# Patient Record
Sex: MALE | Race: WHITE | NOT HISPANIC OR LATINO
[De-identification: names, ages, dates, MRNs, and addresses within clinical notes are randomized per-mention and may not be internally consistent; named-entity substitution may affect disease eponyms.]

---

## 2017-01-15 ENCOUNTER — RESULT REVIEW (OUTPATIENT)
Age: 77
End: 2017-01-15

## 2017-03-19 ENCOUNTER — RESULT REVIEW (OUTPATIENT)
Age: 77
End: 2017-03-19

## 2017-05-22 ENCOUNTER — RESULT REVIEW (OUTPATIENT)
Age: 77
End: 2017-05-22

## 2017-08-03 ENCOUNTER — APPOINTMENT (OUTPATIENT)
Dept: ORTHOPEDIC SURGERY | Facility: CLINIC | Age: 77
End: 2017-08-03
Payer: MEDICARE

## 2017-08-03 DIAGNOSIS — M17.12 UNILATERAL PRIMARY OSTEOARTHRITIS, LEFT KNEE: ICD-10-CM

## 2017-08-03 PROCEDURE — 99213 OFFICE O/P EST LOW 20 MIN: CPT

## 2017-09-08 PROBLEM — M17.12 PRIMARY OSTEOARTHRITIS OF LEFT KNEE: Status: ACTIVE | Noted: 2017-09-08

## 2018-04-25 ENCOUNTER — APPOINTMENT (OUTPATIENT)
Dept: VASCULAR SURGERY | Facility: CLINIC | Age: 78
End: 2018-04-25
Payer: MEDICARE

## 2018-04-25 VITALS
BODY MASS INDEX: 23.62 KG/M2 | WEIGHT: 165 LBS | DIASTOLIC BLOOD PRESSURE: 80 MMHG | TEMPERATURE: 98.4 F | HEART RATE: 76 BPM | HEIGHT: 70 IN | SYSTOLIC BLOOD PRESSURE: 128 MMHG

## 2018-04-25 DIAGNOSIS — R09.89 OTHER SPECIFIED SYMPTOMS AND SIGNS INVOLVING THE CIRCULATORY AND RESPIRATORY SYSTEMS: ICD-10-CM

## 2018-04-25 PROCEDURE — 99213 OFFICE O/P EST LOW 20 MIN: CPT

## 2018-08-27 ENCOUNTER — RESULT REVIEW (OUTPATIENT)
Age: 78
End: 2018-08-27

## 2018-10-29 ENCOUNTER — RX RENEWAL (OUTPATIENT)
Age: 78
End: 2018-10-29

## 2020-10-26 ENCOUNTER — INPATIENT (INPATIENT)
Facility: HOSPITAL | Age: 80
LOS: 6 days | Discharge: HOME | DRG: 872 | End: 2020-11-02
Attending: HOSPITALIST | Admitting: STUDENT IN AN ORGANIZED HEALTH CARE EDUCATION/TRAINING PROGRAM
Payer: MEDICARE

## 2020-10-26 VITALS
WEIGHT: 162.04 LBS | DIASTOLIC BLOOD PRESSURE: 55 MMHG | SYSTOLIC BLOOD PRESSURE: 134 MMHG | TEMPERATURE: 99 F | OXYGEN SATURATION: 98 % | HEART RATE: 114 BPM | RESPIRATION RATE: 20 BRPM | HEIGHT: 70 IN

## 2020-10-26 DIAGNOSIS — Z90.49 ACQUIRED ABSENCE OF OTHER SPECIFIED PARTS OF DIGESTIVE TRACT: Chronic | ICD-10-CM

## 2020-10-26 DIAGNOSIS — S32.9XXA FRACTURE OF UNSPECIFIED PARTS OF LUMBOSACRAL SPINE AND PELVIS, INITIAL ENCOUNTER FOR CLOSED FRACTURE: Chronic | ICD-10-CM

## 2020-10-26 LAB
ALBUMIN SERPL ELPH-MCNC: 3.8 G/DL — SIGNIFICANT CHANGE UP (ref 3.3–5)
ALP SERPL-CCNC: 160 U/L — HIGH (ref 40–120)
ALT FLD-CCNC: 48 U/L — HIGH (ref 10–45)
ANION GAP SERPL CALC-SCNC: 12 MMOL/L — SIGNIFICANT CHANGE UP (ref 5–17)
APPEARANCE UR: ABNORMAL
APTT BLD: 32.8 SEC — SIGNIFICANT CHANGE UP (ref 27.5–35.5)
AST SERPL-CCNC: 46 U/L — HIGH (ref 10–40)
BACTERIA # UR AUTO: ABNORMAL
BILIRUB SERPL-MCNC: 0.7 MG/DL — SIGNIFICANT CHANGE UP (ref 0.2–1.2)
BILIRUB UR-MCNC: NEGATIVE — SIGNIFICANT CHANGE UP
BUN SERPL-MCNC: 20 MG/DL — SIGNIFICANT CHANGE UP (ref 7–23)
CALCIUM SERPL-MCNC: 9.3 MG/DL — SIGNIFICANT CHANGE UP (ref 8.4–10.5)
CHLORIDE SERPL-SCNC: 100 MMOL/L — SIGNIFICANT CHANGE UP (ref 96–108)
CO2 SERPL-SCNC: 25 MMOL/L — SIGNIFICANT CHANGE UP (ref 22–31)
COLOR SPEC: YELLOW — SIGNIFICANT CHANGE UP
CREAT SERPL-MCNC: 1.57 MG/DL — HIGH (ref 0.5–1.3)
DIFF PNL FLD: ABNORMAL
EPI CELLS # UR: 2 /HPF — SIGNIFICANT CHANGE UP
GLUCOSE SERPL-MCNC: 101 MG/DL — HIGH (ref 70–99)
GLUCOSE UR QL: NEGATIVE — SIGNIFICANT CHANGE UP
HCT VFR BLD CALC: 37 % — LOW (ref 39–50)
HGB BLD-MCNC: 12.1 G/DL — LOW (ref 13–17)
HYALINE CASTS # UR AUTO: 0 /LPF — SIGNIFICANT CHANGE UP (ref 0–2)
INR BLD: 1.32 RATIO — HIGH (ref 0.88–1.16)
KETONES UR-MCNC: SIGNIFICANT CHANGE UP
LACTATE BLDV-MCNC: 1.5 MMOL/L — SIGNIFICANT CHANGE UP (ref 0.7–2)
LEUKOCYTE ESTERASE UR-ACNC: ABNORMAL
MCHC RBC-ENTMCNC: 27.8 PG — SIGNIFICANT CHANGE UP (ref 27–34)
MCHC RBC-ENTMCNC: 32.7 GM/DL — SIGNIFICANT CHANGE UP (ref 32–36)
MCV RBC AUTO: 85.1 FL — SIGNIFICANT CHANGE UP (ref 80–100)
NITRITE UR-MCNC: NEGATIVE — SIGNIFICANT CHANGE UP
PH UR: 6 — SIGNIFICANT CHANGE UP (ref 5–8)
PLATELET # BLD AUTO: 234 K/UL — SIGNIFICANT CHANGE UP (ref 150–400)
POTASSIUM SERPL-MCNC: 4.3 MMOL/L — SIGNIFICANT CHANGE UP (ref 3.5–5.3)
POTASSIUM SERPL-SCNC: 4.3 MMOL/L — SIGNIFICANT CHANGE UP (ref 3.5–5.3)
PROT SERPL-MCNC: 7 G/DL — SIGNIFICANT CHANGE UP (ref 6–8.3)
PROT UR-MCNC: 100 — SIGNIFICANT CHANGE UP
PROTHROM AB SERPL-ACNC: 15.6 SEC — HIGH (ref 10.6–13.6)
RBC # BLD: 4.35 M/UL — SIGNIFICANT CHANGE UP (ref 4.2–5.8)
RBC # FLD: 14.7 % — HIGH (ref 10.3–14.5)
RBC CASTS # UR COMP ASSIST: 13 /HPF — HIGH (ref 0–4)
SODIUM SERPL-SCNC: 137 MMOL/L — SIGNIFICANT CHANGE UP (ref 135–145)
SP GR SPEC: 1.02 — SIGNIFICANT CHANGE UP (ref 1.01–1.02)
UROBILINOGEN FLD QL: NEGATIVE — SIGNIFICANT CHANGE UP
WBC # BLD: 21.17 K/UL — HIGH (ref 3.8–10.5)
WBC # FLD AUTO: 21.17 K/UL — HIGH (ref 3.8–10.5)
WBC UR QL: 420 /HPF — HIGH (ref 0–5)

## 2020-10-26 PROCEDURE — 93010 ELECTROCARDIOGRAM REPORT: CPT

## 2020-10-26 PROCEDURE — 71045 X-RAY EXAM CHEST 1 VIEW: CPT | Mod: 26

## 2020-10-26 PROCEDURE — 99285 EMERGENCY DEPT VISIT HI MDM: CPT | Mod: GC

## 2020-10-26 RX ORDER — SODIUM CHLORIDE 9 MG/ML
2300 INJECTION INTRAMUSCULAR; INTRAVENOUS; SUBCUTANEOUS ONCE
Refills: 0 | Status: COMPLETED | OUTPATIENT
Start: 2020-10-26 | End: 2020-10-26

## 2020-10-26 RX ORDER — CEFTRIAXONE 500 MG/1
1000 INJECTION, POWDER, FOR SOLUTION INTRAMUSCULAR; INTRAVENOUS ONCE
Refills: 0 | Status: DISCONTINUED | OUTPATIENT
Start: 2020-10-26 | End: 2020-10-26

## 2020-10-26 RX ORDER — PIPERACILLIN AND TAZOBACTAM 4; .5 G/20ML; G/20ML
3.38 INJECTION, POWDER, LYOPHILIZED, FOR SOLUTION INTRAVENOUS ONCE
Refills: 0 | Status: COMPLETED | OUTPATIENT
Start: 2020-10-26 | End: 2020-10-26

## 2020-10-26 RX ADMIN — PIPERACILLIN AND TAZOBACTAM 200 GRAM(S): 4; .5 INJECTION, POWDER, LYOPHILIZED, FOR SOLUTION INTRAVENOUS at 23:10

## 2020-10-26 RX ADMIN — SODIUM CHLORIDE 2300 MILLILITER(S): 9 INJECTION INTRAMUSCULAR; INTRAVENOUS; SUBCUTANEOUS at 23:20

## 2020-10-26 NOTE — ED PROVIDER NOTE - PSH
Cholecystitis    Fracture of pelvis    H/O colectomy  Partial  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  S/P cholecystectomy    S/P femoral-popliteal bypass surgery  Left

## 2020-10-26 NOTE — ED ADULT TRIAGE NOTE - CHIEF COMPLAINT QUOTE
pt states urinary symptoms continuing after course of cipro followed by augmentin pt states dysuria pressure and dribbling pt is diabetic pt has loop recorder in chest also states itching attacks to groin at night pt states he finished the antibiotic last week

## 2020-10-26 NOTE — ED PROVIDER NOTE - PHYSICAL EXAMINATION
GEN - NAD; well appearing; A+Ox3   HEAD - NC/AT, No visible Ecchymosis, No Abrasions, No Lacerations/Skin Tears     EYES - EOMI, no conjunctival pallor, no scleral icterus  PULM - CTA B/L,  symmetric breath sounds  COR -  RRR, S1 S2, no murmurs  ABD - NT/ND, soft, no guarding, no rebound, no masses    BACK - no CVA tenderness, nontender CTL spine     EXTREMS - 2+ Pulses B/L UE and LE; 0+ edema, no gross deformity, warm and well perfused, no calf tenderness/swelling/erythema    SKIN - no rash or bruising      NEUROLOGIC - alert, CN2-12 intact, sensation nl, moving all 4 ext GEN - NAD; well appearing; A+Ox3   HEAD - NC/AT, No visible Ecchymosis, No Abrasions, No Lacerations/Skin Tears     EYES - EOMI, no conjunctival pallor, no scleral icterus  PULM - Crackles R Base,  symmetric breath sounds  COR -  Tachy, S1 S2, no murmurs  ABD - NT/ND, soft, no guarding, no rebound, no masses    BACK - no CVA tenderness    EXTREMS - 0+ edema, no gross deformity, warm and well perfused, no calf tenderness/swelling/erythema    NEUROLOGIC - alert, moving all 4 ext w/ 5/5 Strength

## 2020-10-26 NOTE — ED ADULT NURSE REASSESSMENT NOTE - NS ED NURSE REASSESS COMMENT FT1
Received report from Nichelle METCALF. Patient resting in stretcher in purple room 4. VSS. Patient denies pain at this time. IV patent. NSR on monitor. Gave patient call bell and instructed to call RN if anything is needed. Bed in lowest position. Side rails up. Pending bloodwork results.

## 2020-10-26 NOTE — ED ADULT NURSE NOTE - NSIMPLEMENTINTERV_GEN_ALL_ED
Implemented All Universal Safety Interventions:  [place] to call system. Call bell, personal items and telephone within reach. Instruct patient to call for assistance. Room bathroom lighting operational. Non-slip footwear when patient is off stretcher. Physically safe environment: no spills, clutter or unnecessary equipment. Stretcher in lowest position, wheels locked, appropriate side rails in place.

## 2020-10-26 NOTE — ED PROVIDER NOTE - OBJECTIVE STATEMENT
79 male, hx: HTN, HLD, IDDM - presents with burning upon urination, pain upon urination for 1 month; fevers to (102F) today. Followed by PMD outpatient, started on Cipro (completed 1 week course), and then Augmentin (1 week course) - still with persistent dysuric symptoms and now development of fevers. Denies any CP, SOB, cough, abdominal pain, nausea, vomiting, diarrhea, constipation, bloody stools. Denies any testicular pain/swelling.

## 2020-10-26 NOTE — ED PROVIDER NOTE - NS ED ROS FT
Constitution: (+) Fever or chills,   Eyes: No visual changes  HEENT: No cough, No Discharge, No Rhinorrhea, No URI symptoms  Cardio: No Chest pain, No Palpitations, No Dyspnea  Resp: No SOB, No Wheezing  GI: No abdominal pain, No Nausea, No Vomiting, No Constipation, No Diarrhea  : (+) burning upon urination, (+) trouble urinating, (+) foul odor from urine  MSK: No Back pain, No Numbness, No Tingling, No Weakness

## 2020-10-26 NOTE — ED PROVIDER NOTE - CARE PLAN
Principal Discharge DX:	Acute cystitis with hematuria   Principal Discharge DX:	Pyelonephritis  Secondary Diagnosis:	Sepsis, due to unspecified organism, unspecified whether acute organ dysfunction present

## 2020-10-26 NOTE — ED PROVIDER NOTE - CLINICAL SUMMARY MEDICAL DECISION MAKING FREE TEXT BOX
79M pmh HTN, HLD, IDDM, 1 month of dysuria completed course of Cipro and Augmentin, now p/w persistent dysuria and fevers to (102F) today.  Pt found to be febrile, tachycardic although in NAD.  abd soft ntnd, no cva ttp.  likely septic from uti and failed PO abx.  Will plan for labs, ua, cultures, IV fluids, abx and admission.  - Angelika Anne, DO

## 2020-10-26 NOTE — ED ADULT NURSE NOTE - OBJECTIVE STATEMENT
79y male arrived to ED complaining of urinary sx. Patient complaining of itching, burning, urgency. Patient dx with yeast infection and UTI outpatient - given cipro and Augmentin without resolve of sx. Patient febrile in ED, tachycardic - code sepsis initiated. Patient post-residual void 30ml on bladder scan. PMHx DM, HLD, PVD, HTN. Patient denies CP, SOB, n/v/d, abd pain. Patient A&Ox4, ambulatory.

## 2020-10-26 NOTE — ED PROVIDER NOTE - PMH
Diabetes    Diabetes mellitus    Hypercholesteremia    Hyperlipidemia    Hypertension    Peripheral vascular disease

## 2020-10-27 DIAGNOSIS — N30.00 ACUTE CYSTITIS WITHOUT HEMATURIA: ICD-10-CM

## 2020-10-27 DIAGNOSIS — N12 TUBULO-INTERSTITIAL NEPHRITIS, NOT SPECIFIED AS ACUTE OR CHRONIC: ICD-10-CM

## 2020-10-27 DIAGNOSIS — N17.9 ACUTE KIDNEY FAILURE, UNSPECIFIED: ICD-10-CM

## 2020-10-27 DIAGNOSIS — N30.01 ACUTE CYSTITIS WITH HEMATURIA: ICD-10-CM

## 2020-10-27 DIAGNOSIS — N32.1 VESICOINTESTINAL FISTULA: ICD-10-CM

## 2020-10-27 DIAGNOSIS — Z29.9 ENCOUNTER FOR PROPHYLACTIC MEASURES, UNSPECIFIED: ICD-10-CM

## 2020-10-27 DIAGNOSIS — E11.22 TYPE 2 DIABETES MELLITUS WITH DIABETIC CHRONIC KIDNEY DISEASE: ICD-10-CM

## 2020-10-27 DIAGNOSIS — N40.1 BENIGN PROSTATIC HYPERPLASIA WITH LOWER URINARY TRACT SYMPTOMS: ICD-10-CM

## 2020-10-27 DIAGNOSIS — I10 ESSENTIAL (PRIMARY) HYPERTENSION: ICD-10-CM

## 2020-10-27 DIAGNOSIS — A41.9 SEPSIS, UNSPECIFIED ORGANISM: ICD-10-CM

## 2020-10-27 DIAGNOSIS — E78.00 PURE HYPERCHOLESTEROLEMIA, UNSPECIFIED: ICD-10-CM

## 2020-10-27 LAB
ALBUMIN SERPL ELPH-MCNC: 3.1 G/DL — LOW (ref 3.3–5)
ALP SERPL-CCNC: 132 U/L — HIGH (ref 40–120)
ALT FLD-CCNC: 37 U/L — SIGNIFICANT CHANGE UP (ref 10–45)
ANION GAP SERPL CALC-SCNC: 9 MMOL/L — SIGNIFICANT CHANGE UP (ref 5–17)
AST SERPL-CCNC: 33 U/L — SIGNIFICANT CHANGE UP (ref 10–40)
BASOPHILS # BLD AUTO: 0 K/UL — SIGNIFICANT CHANGE UP (ref 0–0.2)
BASOPHILS NFR BLD AUTO: 0 % — SIGNIFICANT CHANGE UP (ref 0–2)
BILIRUB SERPL-MCNC: 0.7 MG/DL — SIGNIFICANT CHANGE UP (ref 0.2–1.2)
BUN SERPL-MCNC: 19 MG/DL — SIGNIFICANT CHANGE UP (ref 7–23)
CALCIUM SERPL-MCNC: 8.4 MG/DL — SIGNIFICANT CHANGE UP (ref 8.4–10.5)
CHLORIDE SERPL-SCNC: 105 MMOL/L — SIGNIFICANT CHANGE UP (ref 96–108)
CO2 SERPL-SCNC: 23 MMOL/L — SIGNIFICANT CHANGE UP (ref 22–31)
CREAT SERPL-MCNC: 1.25 MG/DL — SIGNIFICANT CHANGE UP (ref 0.5–1.3)
EOSINOPHIL # BLD AUTO: 0.19 K/UL — SIGNIFICANT CHANGE UP (ref 0–0.5)
EOSINOPHIL NFR BLD AUTO: 0.9 % — SIGNIFICANT CHANGE UP (ref 0–6)
GLUCOSE BLDC GLUCOMTR-MCNC: 121 MG/DL — HIGH (ref 70–99)
GLUCOSE BLDC GLUCOMTR-MCNC: 197 MG/DL — HIGH (ref 70–99)
GLUCOSE BLDC GLUCOMTR-MCNC: 215 MG/DL — HIGH (ref 70–99)
GLUCOSE BLDC GLUCOMTR-MCNC: 250 MG/DL — HIGH (ref 70–99)
GLUCOSE SERPL-MCNC: 103 MG/DL — HIGH (ref 70–99)
HCT VFR BLD CALC: 32.7 % — LOW (ref 39–50)
HGB BLD-MCNC: 10.6 G/DL — LOW (ref 13–17)
LYMPHOCYTES # BLD AUTO: 1.44 K/UL — SIGNIFICANT CHANGE UP (ref 1–3.3)
LYMPHOCYTES # BLD AUTO: 6.8 % — LOW (ref 13–44)
MANUAL SMEAR VERIFICATION: SIGNIFICANT CHANGE UP
MCHC RBC-ENTMCNC: 28 PG — SIGNIFICANT CHANGE UP (ref 27–34)
MCHC RBC-ENTMCNC: 32.4 GM/DL — SIGNIFICANT CHANGE UP (ref 32–36)
MCV RBC AUTO: 86.5 FL — SIGNIFICANT CHANGE UP (ref 80–100)
MONOCYTES # BLD AUTO: 1.27 K/UL — HIGH (ref 0–0.9)
MONOCYTES NFR BLD AUTO: 6 % — SIGNIFICANT CHANGE UP (ref 2–14)
NEUTROPHILS # BLD AUTO: 18.27 K/UL — HIGH (ref 1.8–7.4)
NEUTROPHILS NFR BLD AUTO: 86.3 % — HIGH (ref 43–77)
NRBC # BLD: 0 /100 WBCS — SIGNIFICANT CHANGE UP (ref 0–0)
PLAT MORPH BLD: NORMAL — SIGNIFICANT CHANGE UP
PLATELET # BLD AUTO: 182 K/UL — SIGNIFICANT CHANGE UP (ref 150–400)
POTASSIUM SERPL-MCNC: 4.1 MMOL/L — SIGNIFICANT CHANGE UP (ref 3.5–5.3)
POTASSIUM SERPL-SCNC: 4.1 MMOL/L — SIGNIFICANT CHANGE UP (ref 3.5–5.3)
PROT SERPL-MCNC: 5.9 G/DL — LOW (ref 6–8.3)
RAPID RVP RESULT: SIGNIFICANT CHANGE UP
RBC # BLD: 3.78 M/UL — LOW (ref 4.2–5.8)
RBC # FLD: 14.7 % — HIGH (ref 10.3–14.5)
RBC BLD AUTO: SIGNIFICANT CHANGE UP
SARS-COV-2 RNA SPEC QL NAA+PROBE: SIGNIFICANT CHANGE UP
SODIUM SERPL-SCNC: 137 MMOL/L — SIGNIFICANT CHANGE UP (ref 135–145)
WBC # BLD: 13.97 K/UL — HIGH (ref 3.8–10.5)
WBC # FLD AUTO: 13.97 K/UL — HIGH (ref 3.8–10.5)

## 2020-10-27 PROCEDURE — 12345: CPT | Mod: NC

## 2020-10-27 PROCEDURE — 76770 US EXAM ABDO BACK WALL COMP: CPT | Mod: 26

## 2020-10-27 PROCEDURE — 99222 1ST HOSP IP/OBS MODERATE 55: CPT

## 2020-10-27 PROCEDURE — 99223 1ST HOSP IP/OBS HIGH 75: CPT | Mod: AI

## 2020-10-27 RX ORDER — CARVEDILOL PHOSPHATE 80 MG/1
6.25 CAPSULE, EXTENDED RELEASE ORAL EVERY 12 HOURS
Refills: 0 | Status: DISCONTINUED | OUTPATIENT
Start: 2020-10-27 | End: 2020-10-31

## 2020-10-27 RX ORDER — INSULIN LISPRO 100/ML
VIAL (ML) SUBCUTANEOUS AT BEDTIME
Refills: 0 | Status: DISCONTINUED | OUTPATIENT
Start: 2020-10-27 | End: 2020-11-02

## 2020-10-27 RX ORDER — SODIUM CHLORIDE 9 MG/ML
1000 INJECTION, SOLUTION INTRAVENOUS
Refills: 0 | Status: DISCONTINUED | OUTPATIENT
Start: 2020-10-27 | End: 2020-11-02

## 2020-10-27 RX ORDER — INSULIN LISPRO 100/ML
VIAL (ML) SUBCUTANEOUS
Refills: 0 | Status: DISCONTINUED | OUTPATIENT
Start: 2020-10-27 | End: 2020-11-02

## 2020-10-27 RX ORDER — GLUCAGON INJECTION, SOLUTION 0.5 MG/.1ML
1 INJECTION, SOLUTION SUBCUTANEOUS ONCE
Refills: 0 | Status: DISCONTINUED | OUTPATIENT
Start: 2020-10-27 | End: 2020-11-02

## 2020-10-27 RX ORDER — DEXTROSE 50 % IN WATER 50 %
15 SYRINGE (ML) INTRAVENOUS ONCE
Refills: 0 | Status: DISCONTINUED | OUTPATIENT
Start: 2020-10-27 | End: 2020-11-02

## 2020-10-27 RX ORDER — CILOSTAZOL 100 MG/1
50 TABLET ORAL
Refills: 0 | Status: DISCONTINUED | OUTPATIENT
Start: 2020-10-27 | End: 2020-11-02

## 2020-10-27 RX ORDER — DEXTROSE 50 % IN WATER 50 %
25 SYRINGE (ML) INTRAVENOUS ONCE
Refills: 0 | Status: DISCONTINUED | OUTPATIENT
Start: 2020-10-27 | End: 2020-11-02

## 2020-10-27 RX ORDER — ASPIRIN/CALCIUM CARB/MAGNESIUM 324 MG
81 TABLET ORAL DAILY
Refills: 0 | Status: DISCONTINUED | OUTPATIENT
Start: 2020-10-27 | End: 2020-11-02

## 2020-10-27 RX ORDER — SODIUM CHLORIDE 9 MG/ML
1000 INJECTION INTRAMUSCULAR; INTRAVENOUS; SUBCUTANEOUS
Refills: 0 | Status: DISCONTINUED | OUTPATIENT
Start: 2020-10-27 | End: 2020-10-28

## 2020-10-27 RX ORDER — INSULIN GLARGINE 100 [IU]/ML
30 INJECTION, SOLUTION SUBCUTANEOUS AT BEDTIME
Refills: 0 | Status: DISCONTINUED | OUTPATIENT
Start: 2020-10-27 | End: 2020-10-30

## 2020-10-27 RX ORDER — HEPARIN SODIUM 5000 [USP'U]/ML
5000 INJECTION INTRAVENOUS; SUBCUTANEOUS EVERY 8 HOURS
Refills: 0 | Status: DISCONTINUED | OUTPATIENT
Start: 2020-10-27 | End: 2020-11-02

## 2020-10-27 RX ORDER — TAMSULOSIN HYDROCHLORIDE 0.4 MG/1
0.4 CAPSULE ORAL AT BEDTIME
Refills: 0 | Status: DISCONTINUED | OUTPATIENT
Start: 2020-10-27 | End: 2020-11-01

## 2020-10-27 RX ORDER — ATORVASTATIN CALCIUM 80 MG/1
20 TABLET, FILM COATED ORAL AT BEDTIME
Refills: 0 | Status: DISCONTINUED | OUTPATIENT
Start: 2020-10-27 | End: 2020-11-02

## 2020-10-27 RX ORDER — DEXTROSE 50 % IN WATER 50 %
12.5 SYRINGE (ML) INTRAVENOUS ONCE
Refills: 0 | Status: DISCONTINUED | OUTPATIENT
Start: 2020-10-27 | End: 2020-11-02

## 2020-10-27 RX ORDER — PIPERACILLIN AND TAZOBACTAM 4; .5 G/20ML; G/20ML
3.38 INJECTION, POWDER, LYOPHILIZED, FOR SOLUTION INTRAVENOUS EVERY 8 HOURS
Refills: 0 | Status: COMPLETED | OUTPATIENT
Start: 2020-10-27 | End: 2020-10-30

## 2020-10-27 RX ORDER — PIPERACILLIN AND TAZOBACTAM 4; .5 G/20ML; G/20ML
3.38 INJECTION, POWDER, LYOPHILIZED, FOR SOLUTION INTRAVENOUS ONCE
Refills: 0 | Status: COMPLETED | OUTPATIENT
Start: 2020-10-27 | End: 2020-10-27

## 2020-10-27 RX ADMIN — TAMSULOSIN HYDROCHLORIDE 0.4 MILLIGRAM(S): 0.4 CAPSULE ORAL at 22:46

## 2020-10-27 RX ADMIN — ATORVASTATIN CALCIUM 20 MILLIGRAM(S): 80 TABLET, FILM COATED ORAL at 22:46

## 2020-10-27 RX ADMIN — HEPARIN SODIUM 5000 UNIT(S): 5000 INJECTION INTRAVENOUS; SUBCUTANEOUS at 14:30

## 2020-10-27 RX ADMIN — Medication 2: at 17:34

## 2020-10-27 RX ADMIN — Medication 81 MILLIGRAM(S): at 12:21

## 2020-10-27 RX ADMIN — INSULIN GLARGINE 30 UNIT(S): 100 INJECTION, SOLUTION SUBCUTANEOUS at 22:46

## 2020-10-27 RX ADMIN — HEPARIN SODIUM 5000 UNIT(S): 5000 INJECTION INTRAVENOUS; SUBCUTANEOUS at 22:46

## 2020-10-27 RX ADMIN — PIPERACILLIN AND TAZOBACTAM 200 GRAM(S): 4; .5 INJECTION, POWDER, LYOPHILIZED, FOR SOLUTION INTRAVENOUS at 08:18

## 2020-10-27 RX ADMIN — CARVEDILOL PHOSPHATE 6.25 MILLIGRAM(S): 80 CAPSULE, EXTENDED RELEASE ORAL at 17:34

## 2020-10-27 RX ADMIN — SODIUM CHLORIDE 75 MILLILITER(S): 9 INJECTION INTRAMUSCULAR; INTRAVENOUS; SUBCUTANEOUS at 16:32

## 2020-10-27 RX ADMIN — PIPERACILLIN AND TAZOBACTAM 25 GRAM(S): 4; .5 INJECTION, POWDER, LYOPHILIZED, FOR SOLUTION INTRAVENOUS at 14:30

## 2020-10-27 RX ADMIN — SODIUM CHLORIDE 2300 MILLILITER(S): 9 INJECTION INTRAMUSCULAR; INTRAVENOUS; SUBCUTANEOUS at 01:28

## 2020-10-27 RX ADMIN — Medication 1: at 12:20

## 2020-10-27 RX ADMIN — PIPERACILLIN AND TAZOBACTAM 25 GRAM(S): 4; .5 INJECTION, POWDER, LYOPHILIZED, FOR SOLUTION INTRAVENOUS at 22:47

## 2020-10-27 RX ADMIN — CILOSTAZOL 50 MILLIGRAM(S): 100 TABLET ORAL at 17:34

## 2020-10-27 NOTE — H&P ADULT - NSICDXFAMILYHX_GEN_ALL_CORE_FT
FAMILY HISTORY:  No pertinent family history in first degree relatives     FAMILY HISTORY:  FH: Alzheimers disease, mother   FH: diabetes mellitus, brother  FH: pancreatic cancer, father

## 2020-10-27 NOTE — H&P ADULT - PROBLEM SELECTOR PROBLEM 3
Type 2 diabetes mellitus with stage 3 chronic kidney disease, with long-term current use of insulin, unspecified whether stage 3a or 3b CKD

## 2020-10-27 NOTE — CONSULT NOTE ADULT - SUBJECTIVE AND OBJECTIVE BOX
HPI  Mr Giuliano is a 79 male with hx of HTN, HLD, DM presenting with refractory urinary symptoms and UTIs. Patient has been on multiple courses of antibiotics without improvement. He was seen by his PMD for followup and was found to have fever. Was sent to hospital for further evaluation.   Patient says that over the last few months he has had air in his urine. Also notes small brown specks that sediment at the bottom of his urine. Dysuria is present.    PAST MEDICAL & SURGICAL HISTORY:  BPH (benign prostatic hyperplasia)    Peripheral vascular disease    Diabetes mellitus    Hypercholesteremia    Hypertension    S/P cholecystectomy    Fracture of pelvis    S/P femoral-popliteal bypass surgery  Left    Cholecystitis    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    H/O colectomy  Partial        MEDICATIONS  (STANDING):  aspirin enteric coated 81 milliGRAM(s) Oral daily  atorvastatin 20 milliGRAM(s) Oral at bedtime  carvedilol 6.25 milliGRAM(s) Oral every 12 hours  cilostazol 50 milliGRAM(s) Oral two times a day  dextrose 5%. 1000 milliLiter(s) (50 mL/Hr) IV Continuous <Continuous>  dextrose 50% Injectable 12.5 Gram(s) IV Push once  dextrose 50% Injectable 25 Gram(s) IV Push once  dextrose 50% Injectable 25 Gram(s) IV Push once  heparin   Injectable 5000 Unit(s) SubCutaneous every 8 hours  insulin glargine Injectable (LANTUS) 30 Unit(s) SubCutaneous at bedtime  insulin lispro (ADMELOG) corrective regimen sliding scale   SubCutaneous three times a day before meals  insulin lispro (ADMELOG) corrective regimen sliding scale   SubCutaneous at bedtime  piperacillin/tazobactam IVPB.. 3.375 Gram(s) IV Intermittent every 8 hours  sodium chloride 0.9%. 1000 milliLiter(s) (75 mL/Hr) IV Continuous <Continuous>  tamsulosin 0.4 milliGRAM(s) Oral at bedtime    MEDICATIONS  (PRN):  dextrose 40% Gel 15 Gram(s) Oral once PRN Blood Glucose LESS THAN 70 milliGRAM(s)/deciliter  glucagon  Injectable 1 milliGRAM(s) IntraMuscular once PRN Glucose LESS THAN 70 milligrams/deciliter      FAMILY HISTORY:  FH: diabetes mellitus  brother    FH: Alzheimers disease  mother     FH: pancreatic cancer  father         Allergies    vancomycin (Nephrotoxicity)    Intolerances        SOCIAL HISTORY:    REVIEW OF SYSTEMS:   Otherwise negative as stated in HPI    Physical Exam  Vital signs  T(C): 36.8 (10-27-20 @ 19:47), Max: 38.7 (10-26-20 @ 22:45)  HR: 95 (10-27-20 @ 19:47)  BP: 145/70 (10-27-20 @ 19:47)  SpO2: 98% (10-27-20 @ 19:47)  Wt(kg): --    Output    OUT:    Voided (mL): 650 mL  Total OUT: 650 mL    Total NET: -650 mL          Gen:  NAD    Pulm:  No respiratory distress    GI:  S/ND/NT    :  No CVA tenderness        LABS:  10-27 @ 06:56    WBC 13.97 / Hct 32.7  / SCr 1.25     10-26 @ 23:00    WBC 21.17 / Hct 37.0  / SCr 1.57     10-27    137  |  105  |  19  ----------------------------<  103<H>  4.1   |  23  |  1.25    Ca    8.4      27 Oct 2020 06:56    TPro  5.9<L>  /  Alb  3.1<L>  /  TBili  0.7  /  DBili  x   /  AST  33  /  ALT  37  /  AlkPhos  132<H>  10-27    PT/INR - ( 26 Oct 2020 23:00 )   PT: 15.6 sec;   INR: 1.32 ratio         PTT - ( 26 Oct 2020 23:00 )  PTT:32.8 sec  Urinalysis Basic - ( 26 Oct 2020 22:59 )    Color: Yellow / Appearance: Slightly Turbid / S.024 / pH: x  Gluc: x / Ketone: Trace  / Bili: Negative / Urobili: Negative   Blood: x / Protein: 100 / Nitrite: Negative   Leuk Esterase: Large / RBC: 13 /hpf /  /HPF   Sq Epi: x / Non Sq Epi: 2 /hpf / Bacteria: Few        RADIOLOGY:  < from: US Kidney and Bladder (10.27.20 @ 14:20) >  IMPRESSION:    Colovesicular fistula.    Suggest CT and/or cystogram to further define the full extent of the abnormality.    Examination findings were conveyed to nurse practitioner Marian by Dr. Bond at 1440 hours on 10/27/2020 with read back.    < end of copied text >

## 2020-10-27 NOTE — CONSULT NOTE ADULT - ASSESSMENT
79M with hx of HTN, HLD, DM presenting with recurrent UTIs and fever  US with evidence of colovesical fistula    - Followup culture results  - Continue empiric antibiotics  - Followup CT abd/pelvis to further evaluate fistula and bowel  - Management of colovesical fistula will be per general surgery. Urology will be available if needed for assistance during any operative intervention  D/w Dr. Broussard

## 2020-10-27 NOTE — H&P ADULT - NSHPSOCIALHISTORY_GEN_ALL_CORE
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

## 2020-10-27 NOTE — H&P ADULT - ASSESSMENT
79 M Hx HTN, HLD, IDDM2 p/w 79 M Hx HTN, HLD, IDDM2, BPH p/w refractory UTI and fever a/w pyelonephritis with sepsis and GILMA.

## 2020-10-27 NOTE — PROGRESS NOTE ADULT - SUBJECTIVE AND OBJECTIVE BOX
[facility] Division of Hospital Medicine  Gayatri Mercer MD  Pager (M-F, 6U-8J): [phone]  Other Times:  [phone]    Patient is a 79y old  Male who presents with a chief complaint of fever and urinary symptoms (27 Oct 2020 07:01)      SUBJECTIVE / OVERNIGHT EVENTS:  no acute events overnight  denies nausea/vomiting/sob/cp  reports improved itching at urethra but still feels urine stream starts and stops with associated dysuria  also reports b/l back pain, non radiating worse from sleeping in hospital bed    ADDITIONAL REVIEW OF SYSTEMS:    MEDICATIONS  (STANDING):  aspirin enteric coated 81 milliGRAM(s) Oral daily  atorvastatin 20 milliGRAM(s) Oral at bedtime  carvedilol 6.25 milliGRAM(s) Oral every 12 hours  cilostazol 50 milliGRAM(s) Oral two times a day  dextrose 5%. 1000 milliLiter(s) (50 mL/Hr) IV Continuous <Continuous>  dextrose 50% Injectable 12.5 Gram(s) IV Push once  dextrose 50% Injectable 25 Gram(s) IV Push once  dextrose 50% Injectable 25 Gram(s) IV Push once  heparin   Injectable 5000 Unit(s) SubCutaneous every 8 hours  insulin glargine Injectable (LANTUS) 30 Unit(s) SubCutaneous at bedtime  insulin lispro (ADMELOG) corrective regimen sliding scale   SubCutaneous three times a day before meals  insulin lispro (ADMELOG) corrective regimen sliding scale   SubCutaneous at bedtime  piperacillin/tazobactam IVPB.. 3.375 Gram(s) IV Intermittent every 8 hours  tamsulosin 0.4 milliGRAM(s) Oral at bedtime    MEDICATIONS  (PRN):  dextrose 40% Gel 15 Gram(s) Oral once PRN Blood Glucose LESS THAN 70 milliGRAM(s)/deciliter  glucagon  Injectable 1 milliGRAM(s) IntraMuscular once PRN Glucose LESS THAN 70 milligrams/deciliter      CAPILLARY BLOOD GLUCOSE      POCT Blood Glucose.: 197 mg/dL (27 Oct 2020 12:14)  POCT Blood Glucose.: 121 mg/dL (27 Oct 2020 08:48)  POCT Blood Glucose.: 98 mg/dL (26 Oct 2020 19:27)    I&O's Summary      PHYSICAL EXAM:  Vital Signs Last 24 Hrs  T(C): 36.6 (27 Oct 2020 12:58), Max: 38.7 (26 Oct 2020 22:45)  T(F): 97.9 (27 Oct 2020 12:58), Max: 101.7 (26 Oct 2020 22:45)  HR: 87 (27 Oct 2020 12:58) (81 - 114)  BP: 156/83 (27 Oct 2020 12:58) (111/66 - 163/72)  BP(mean): 86 (27 Oct 2020 03:00) (86 - 86)  RR: 18 (27 Oct 2020 12:58) (16 - 20)  SpO2: 98% (27 Oct 2020 12:58) (95% - 100%)  CONSTITUTIONAL: NAD, well-developed, well-groomed  EYES: conjunctiva and sclera clear  ENMT: Moist oral mucosa  NECK: Supple  RESPIRATORY: Normal respiratory effort; lungs are clear to auscultation bilaterally  CARDIOVASCULAR: Regular rate and rhythm, normal S1 and S2  ABDOMEN: Nontender to palpation, normoactive bowel sounds, no rebound/guarding; No hepatosplenomegaly, no CVAT  PSYCH: A+O to person, place, and time; affect appropriate  NEUROLOGY: normal strength b/l LE, no spinal tenderness      LABS:                        10.6   13.97 )-----------( 182      ( 27 Oct 2020 06:56 )             32.7     10    137  |  105  |  19  ----------------------------<  103<H>  4.1   |  23  |  1.25    Ca    8.4      27 Oct 2020 06:56    TPro  5.9<L>  /  Alb  3.1<L>  /  TBili  0.7  /  DBili  x   /  AST  33  /  ALT  37  /  AlkPhos  132<H>  10    PT/INR - ( 26 Oct 2020 23:00 )   PT: 15.6 sec;   INR: 1.32 ratio         PTT - ( 26 Oct 2020 23:00 )  PTT:32.8 sec      Urinalysis Basic - ( 26 Oct 2020 22:59 )    Color: Yellow / Appearance: Slightly Turbid / S.024 / pH: x  Gluc: x / Ketone: Trace  / Bili: Negative / Urobili: Negative   Blood: x / Protein: 100 / Nitrite: Negative   Leuk Esterase: Large / RBC: 13 /hpf /  /HPF   Sq Epi: x / Non Sq Epi: 2 /hpf / Bacteria: Few          RADIOLOGY & ADDITIONAL TESTS:  Results Reviewed:   Imaging Personally Reviewed:  < from:  Kidney and Bladder (10.27.20 @ 14:20) >  IMPRESSION:    Colovesicular fistula.    Suggest CT and/or cystogram to further define the full extent of the abnormality.    < end of copied text >    Electrocardiogram Personally Reviewed:    COORDINATION OF CARE:  Care Discussed with Consultants/Other Providers [Y/N]:  Prior or Outpatient Records Reviewed [Y/N]:

## 2020-10-27 NOTE — H&P ADULT - NSICDXPASTMEDICALHX_GEN_ALL_CORE_FT
PAST MEDICAL HISTORY:  Diabetes     Diabetes mellitus     Hypercholesteremia     Hyperlipidemia     Hypertension     Peripheral vascular disease      PAST MEDICAL HISTORY:  BPH (benign prostatic hyperplasia)     Diabetes mellitus     Hypercholesteremia     Hypertension     Peripheral vascular disease

## 2020-10-27 NOTE — H&P ADULT - NSICDXPASTSURGICALHX_GEN_ALL_CORE_FT
PAST SURGICAL HISTORY:  Cholecystitis     Fracture of pelvis     H/O colectomy Partial    Ileostomy in place s/p reversal    S/P cholecystectomy     S/P femoral-popliteal bypass surgery Left

## 2020-10-27 NOTE — H&P ADULT - HISTORY OF PRESENT ILLNESS
79 male, hx: HTN, HLD, IDDM - presents with burning upon urination, pain upon urination for 1 month; fevers to (102F) today. Followed by PMD outpatient, started on Cipro (completed 1 week course), and then Augmentin (1 week course) - still with persistent dysuric symptoms and now development of fevers. Denies any CP, SOB, cough, abdominal pain, nausea, vomiting, diarrhea, constipation, bloody stools. Denies any testicular pain/swelling. 79 male, hx: HTN, HLD, IDDM2 sent in by PMD for refractory urinary symptoms and fever.  Patient reports he has been having burning pain upon urination, for 1 month associated with slight nausea and "dribbles".  Patient was evaluated by PMD and treated initially with 1 week course of Cipro, then a week later completion followed by 1 week of Augmentin, without any improvement in dysuria and itching.  Patient went to PMD for f/u and found to have fever of 102 F, sent in by PMD for IV Abx.  Denies any CP, SOB, cough, abdominal pain, vomiting, diarrhea, constipation, bloody stools. Denies any penile discharge, testicular pain/swelling or recent urological procedure.  Last UTI was "many years ago".  Denies flank pain but c/o low back pain "because I was sitting in the triage for 7 hours".

## 2020-10-27 NOTE — H&P ADULT - PROBLEM SELECTOR PLAN 2
- will treat with IVF and Abx  - will monitor renal function with IVF, GILMA in the setting of sepsis

## 2020-10-27 NOTE — H&P ADULT - PROBLEM SELECTOR PLAN 1
refractory to 2 courses of oral Abx  - will treat with Zosyn for now  - will f/u UCx and BCx's  - will check renal sono r/o structural abn or collection

## 2020-10-27 NOTE — H&P ADULT - ADDITIONAL PE
T(F): 98.4 (27 Oct 2020 05:40), Max: 101.7 (26 Oct 2020 22:45)  HR: 86 (27 Oct 2020 05:40) (81 - 114)  BP: 111/66 (27 Oct 2020 05:40) (111/66 - 149/67)  RR: 17 (27 Oct 2020 05:40) (16 - 20)  SpO2: 98% (27 Oct 2020 05:40) (95% - 100%)

## 2020-10-28 DIAGNOSIS — J90 PLEURAL EFFUSION, NOT ELSEWHERE CLASSIFIED: ICD-10-CM

## 2020-10-28 LAB
A1C WITH ESTIMATED AVERAGE GLUCOSE RESULT: 8.2 % — HIGH (ref 4–5.6)
ANION GAP SERPL CALC-SCNC: 8 MMOL/L — SIGNIFICANT CHANGE UP (ref 5–17)
BASOPHILS # BLD AUTO: 0.04 K/UL — SIGNIFICANT CHANGE UP (ref 0–0.2)
BASOPHILS NFR BLD AUTO: 0.5 % — SIGNIFICANT CHANGE UP (ref 0–2)
BUN SERPL-MCNC: 23 MG/DL — SIGNIFICANT CHANGE UP (ref 7–23)
CALCIUM SERPL-MCNC: 8.3 MG/DL — LOW (ref 8.4–10.5)
CHLORIDE SERPL-SCNC: 104 MMOL/L — SIGNIFICANT CHANGE UP (ref 96–108)
CO2 SERPL-SCNC: 23 MMOL/L — SIGNIFICANT CHANGE UP (ref 22–31)
CREAT SERPL-MCNC: 1.46 MG/DL — HIGH (ref 0.5–1.3)
EOSINOPHIL # BLD AUTO: 0.1 K/UL — SIGNIFICANT CHANGE UP (ref 0–0.5)
EOSINOPHIL NFR BLD AUTO: 1.1 % — SIGNIFICANT CHANGE UP (ref 0–6)
ESTIMATED AVERAGE GLUCOSE: 189 MG/DL — HIGH (ref 68–114)
GLUCOSE BLDC GLUCOMTR-MCNC: 117 MG/DL — HIGH (ref 70–99)
GLUCOSE BLDC GLUCOMTR-MCNC: 159 MG/DL — HIGH (ref 70–99)
GLUCOSE BLDC GLUCOMTR-MCNC: 194 MG/DL — HIGH (ref 70–99)
GLUCOSE BLDC GLUCOMTR-MCNC: 240 MG/DL — HIGH (ref 70–99)
GLUCOSE SERPL-MCNC: 100 MG/DL — HIGH (ref 70–99)
HCT VFR BLD CALC: 29.8 % — LOW (ref 39–50)
HGB BLD-MCNC: 9.5 G/DL — LOW (ref 13–17)
IMM GRANULOCYTES NFR BLD AUTO: 0.5 % — SIGNIFICANT CHANGE UP (ref 0–1.5)
LYMPHOCYTES # BLD AUTO: 1.04 K/UL — SIGNIFICANT CHANGE UP (ref 1–3.3)
LYMPHOCYTES # BLD AUTO: 11.8 % — LOW (ref 13–44)
MAGNESIUM SERPL-MCNC: 1.9 MG/DL — SIGNIFICANT CHANGE UP (ref 1.6–2.6)
MCHC RBC-ENTMCNC: 27.5 PG — SIGNIFICANT CHANGE UP (ref 27–34)
MCHC RBC-ENTMCNC: 31.9 GM/DL — LOW (ref 32–36)
MCV RBC AUTO: 86.4 FL — SIGNIFICANT CHANGE UP (ref 80–100)
MONOCYTES # BLD AUTO: 0.86 K/UL — SIGNIFICANT CHANGE UP (ref 0–0.9)
MONOCYTES NFR BLD AUTO: 9.8 % — SIGNIFICANT CHANGE UP (ref 2–14)
NEUTROPHILS # BLD AUTO: 6.71 K/UL — SIGNIFICANT CHANGE UP (ref 1.8–7.4)
NEUTROPHILS NFR BLD AUTO: 76.3 % — SIGNIFICANT CHANGE UP (ref 43–77)
NRBC # BLD: 0 /100 WBCS — SIGNIFICANT CHANGE UP (ref 0–0)
PHOSPHATE SERPL-MCNC: 1.9 MG/DL — LOW (ref 2.5–4.5)
PLATELET # BLD AUTO: 181 K/UL — SIGNIFICANT CHANGE UP (ref 150–400)
POTASSIUM SERPL-MCNC: 3.7 MMOL/L — SIGNIFICANT CHANGE UP (ref 3.5–5.3)
POTASSIUM SERPL-SCNC: 3.7 MMOL/L — SIGNIFICANT CHANGE UP (ref 3.5–5.3)
RBC # BLD: 3.45 M/UL — LOW (ref 4.2–5.8)
RBC # FLD: 14.7 % — HIGH (ref 10.3–14.5)
SODIUM SERPL-SCNC: 135 MMOL/L — SIGNIFICANT CHANGE UP (ref 135–145)
WBC # BLD: 8.79 K/UL — SIGNIFICANT CHANGE UP (ref 3.8–10.5)
WBC # FLD AUTO: 8.79 K/UL — SIGNIFICANT CHANGE UP (ref 3.8–10.5)

## 2020-10-28 PROCEDURE — 99233 SBSQ HOSP IP/OBS HIGH 50: CPT

## 2020-10-28 PROCEDURE — 71045 X-RAY EXAM CHEST 1 VIEW: CPT | Mod: 26

## 2020-10-28 RX ORDER — SODIUM,POTASSIUM PHOSPHATES 278-250MG
1 POWDER IN PACKET (EA) ORAL THREE TIMES A DAY
Refills: 0 | Status: COMPLETED | OUTPATIENT
Start: 2020-10-28 | End: 2020-10-29

## 2020-10-28 RX ADMIN — CARVEDILOL PHOSPHATE 6.25 MILLIGRAM(S): 80 CAPSULE, EXTENDED RELEASE ORAL at 17:19

## 2020-10-28 RX ADMIN — PIPERACILLIN AND TAZOBACTAM 25 GRAM(S): 4; .5 INJECTION, POWDER, LYOPHILIZED, FOR SOLUTION INTRAVENOUS at 06:20

## 2020-10-28 RX ADMIN — HEPARIN SODIUM 5000 UNIT(S): 5000 INJECTION INTRAVENOUS; SUBCUTANEOUS at 06:20

## 2020-10-28 RX ADMIN — INSULIN GLARGINE 30 UNIT(S): 100 INJECTION, SOLUTION SUBCUTANEOUS at 22:33

## 2020-10-28 RX ADMIN — HEPARIN SODIUM 5000 UNIT(S): 5000 INJECTION INTRAVENOUS; SUBCUTANEOUS at 14:29

## 2020-10-28 RX ADMIN — SODIUM CHLORIDE 75 MILLILITER(S): 9 INJECTION INTRAMUSCULAR; INTRAVENOUS; SUBCUTANEOUS at 06:28

## 2020-10-28 RX ADMIN — PIPERACILLIN AND TAZOBACTAM 25 GRAM(S): 4; .5 INJECTION, POWDER, LYOPHILIZED, FOR SOLUTION INTRAVENOUS at 14:29

## 2020-10-28 RX ADMIN — ATORVASTATIN CALCIUM 20 MILLIGRAM(S): 80 TABLET, FILM COATED ORAL at 22:33

## 2020-10-28 RX ADMIN — Medication 1: at 12:35

## 2020-10-28 RX ADMIN — TAMSULOSIN HYDROCHLORIDE 0.4 MILLIGRAM(S): 0.4 CAPSULE ORAL at 22:33

## 2020-10-28 RX ADMIN — Medication 1 PACKET(S): at 22:33

## 2020-10-28 RX ADMIN — CILOSTAZOL 50 MILLIGRAM(S): 100 TABLET ORAL at 17:19

## 2020-10-28 RX ADMIN — HEPARIN SODIUM 5000 UNIT(S): 5000 INJECTION INTRAVENOUS; SUBCUTANEOUS at 22:32

## 2020-10-28 RX ADMIN — CILOSTAZOL 50 MILLIGRAM(S): 100 TABLET ORAL at 06:20

## 2020-10-28 RX ADMIN — Medication 1 PACKET(S): at 14:29

## 2020-10-28 RX ADMIN — PIPERACILLIN AND TAZOBACTAM 25 GRAM(S): 4; .5 INJECTION, POWDER, LYOPHILIZED, FOR SOLUTION INTRAVENOUS at 22:33

## 2020-10-28 RX ADMIN — Medication 81 MILLIGRAM(S): at 11:52

## 2020-10-28 RX ADMIN — Medication 1: at 17:19

## 2020-10-28 RX ADMIN — CARVEDILOL PHOSPHATE 6.25 MILLIGRAM(S): 80 CAPSULE, EXTENDED RELEASE ORAL at 06:20

## 2020-10-28 NOTE — PROVIDER CONTACT NOTE (OTHER) - ASSESSMENT
Pt a+ox4 meeting resistance when advancing catheter, coiling. Pt is voiding independently in urinal, no retention noted on bladder scan.

## 2020-10-28 NOTE — PROVIDER CONTACT NOTE (OTHER) - DATE AND TIME:
Vaccine Information Statement(s) for was given today. This has been reviewed, questions answered, and verbal consent given by Patient for injection(s) and administration of Influenza (Inactivated).    1. Does the patient have a moderate to severe fever?  No  2. Has the patient had a serious reaction to a flu shot before?   No  3. Has the patient ever had Guillian Austin Syndrome within 6 weeks of a previous flu shot?  No  4. Does the patient have a serious allergy to eggs?  No  5. Is the patient less that 6 months of age?  No    Patient is eligible to receive the vaccine based on all questions being answered as 'No'.          Patient tolerated without incident. See immunization grid for documentation.   28-Oct-2020 00:45

## 2020-10-28 NOTE — CHART NOTE - NSCHARTNOTEFT_GEN_A_CORE
Pt urinating with no issues however needs kaye placement for CT scan to evaluate colovesicular fistula, nurse unable to place catheter 2x. Pt denying kaye placement at this time. Pt understands he needs a kaye for the CT scan but will not allow me to try.

## 2020-10-28 NOTE — PROGRESS NOTE ADULT - ASSESSMENT
79 M Hx HTN, HLD, IDDM2, lung cancer s/p resection 1 year ago, BPH p/w urosepsis, 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

## 2020-10-28 NOTE — CHART NOTE - NSCHARTNOTEFT_GEN_A_CORE
Patient was supposed to go for CT A/P to evaluate for Patient was supposed to go for CT A/P to evaluate for colovesical fistula.   Patient needs a Hartley for the CT A/P, but is adamantly refusing. Patient understands the risks, and have verbalized understanding that now completing the recommended diagnostic and treatment regimen may result in permanent physical injury up to and including death.  Will re-attempt Hartley in AM       Daniella Yancey PA-C  #[id number]

## 2020-10-28 NOTE — CHART NOTE - NSCHARTNOTEFT_GEN_A_CORE
Pt refused kaye placement for cystogram. Called General surgery for recommendation for imaging to eval for Colovesicular fistula. Recommended CT abdomen/pelvis with oral and rectal contrast. Pt agreeable. Will follow up with General surgery for further consult after CT results. D/w Dr. Mercer

## 2020-10-28 NOTE — PROGRESS NOTE ADULT - SUBJECTIVE AND OBJECTIVE BOX
[facility] Division of Hospital Medicine  Gayatri Mercer MD  Pager (SHYLA-JIMBO, 0T-3C): [phone]  Other Times:  [phone]    Patient is a 79y old  Male who presents with a chief complaint of fever and urinary symptoms (27 Oct 2020 19:48)      SUBJECTIVE / OVERNIGHT EVENTS:  upset about multiple failed attempts for kaye placement overnight  + cough induced by coughing.  Reports surgery for lung cancer 1 year prior but cough worsening in last days.  Denies dysuria/diarrhea    ADDITIONAL REVIEW OF SYSTEMS:    MEDICATIONS  (STANDING):  aspirin enteric coated 81 milliGRAM(s) Oral daily  atorvastatin 20 milliGRAM(s) Oral at bedtime  carvedilol 6.25 milliGRAM(s) Oral every 12 hours  cilostazol 50 milliGRAM(s) Oral two times a day  dextrose 5%. 1000 milliLiter(s) (50 mL/Hr) IV Continuous <Continuous>  dextrose 50% Injectable 12.5 Gram(s) IV Push once  dextrose 50% Injectable 25 Gram(s) IV Push once  dextrose 50% Injectable 25 Gram(s) IV Push once  heparin   Injectable 5000 Unit(s) SubCutaneous every 8 hours  insulin glargine Injectable (LANTUS) 30 Unit(s) SubCutaneous at bedtime  insulin lispro (ADMELOG) corrective regimen sliding scale   SubCutaneous three times a day before meals  insulin lispro (ADMELOG) corrective regimen sliding scale   SubCutaneous at bedtime  piperacillin/tazobactam IVPB.. 3.375 Gram(s) IV Intermittent every 8 hours  potassium phosphate / sodium phosphate Powder (PHOS-NaK) 1 Packet(s) Oral three times a day  sodium chloride 0.9%. 1000 milliLiter(s) (75 mL/Hr) IV Continuous <Continuous>  tamsulosin 0.4 milliGRAM(s) Oral at bedtime    MEDICATIONS  (PRN):  dextrose 40% Gel 15 Gram(s) Oral once PRN Blood Glucose LESS THAN 70 milliGRAM(s)/deciliter  glucagon  Injectable 1 milliGRAM(s) IntraMuscular once PRN Glucose LESS THAN 70 milligrams/deciliter      CAPILLARY BLOOD GLUCOSE      POCT Blood Glucose.: 159 mg/dL (28 Oct 2020 12:05)  POCT Blood Glucose.: 117 mg/dL (28 Oct 2020 08:24)  POCT Blood Glucose.: 215 mg/dL (27 Oct 2020 21:53)  POCT Blood Glucose.: 250 mg/dL (27 Oct 2020 17:01)    I&O's Summary    27 Oct 2020 07:01  -  28 Oct 2020 07:00  --------------------------------------------------------  IN: 2350 mL / OUT: 1150 mL / NET: 1200 mL    28 Oct 2020 07:01  -  28 Oct 2020 15:49  --------------------------------------------------------  IN: 1375 mL / OUT: 400 mL / NET: 975 mL        PHYSICAL EXAM:  Vital Signs Last 24 Hrs  T(C): 36.9 (28 Oct 2020 12:24), Max: 36.9 (28 Oct 2020 06:17)  T(F): 98.4 (28 Oct 2020 12:24), Max: 98.4 (28 Oct 2020 06:17)  HR: 70 (28 Oct 2020 12:24) (70 - 95)  BP: 105/57 (28 Oct 2020 12:24) (105/57 - 145/81)  BP(mean): --  RR: 18 (28 Oct 2020 12:24) (18 - 18)  SpO2: 100% (28 Oct 2020 12:24) (95% - 100%)  CONSTITUTIONAL: NAD, well-developed, well-groomed  EYES: conjunctiva and sclera clear  ENMT: Moist oral mucosa  NECK: Supple  RESPIRATORY: Normal respiratory effort; lungs are clear to auscultation bilaterally, no wheeze/crackles  CARDIOVASCULAR: Regular rate and rhythm, normal S1 and S2  ABDOMEN: Nontender to palpation, normoactive bowel sounds, no rebound/guarding; No hepatosplenomegaly, no CVAT  PSYCH: A+O to person, place, and time; affect appropriate  NEUROLOGY: normal strength b/l LE, no spinal tenderness    LABS:                        9.5    8.79  )-----------( 181      ( 28 Oct 2020 07:24 )             29.8     10-    135  |  104  |  23  ----------------------------<  100<H>  3.7   |  23  |  1.46<H>    Ca    8.3<L>      28 Oct 2020 07:24  Phos  1.9     10  Mg     1.9     10    TPro  5.9<L>  /  Alb  3.1<L>  /  TBili  0.7  /  DBili  x   /  AST  33  /  ALT  37  /  AlkPhos  132<H>  10    PT/INR - ( 26 Oct 2020 23:00 )   PT: 15.6 sec;   INR: 1.32 ratio         PTT - ( 26 Oct 2020 23:00 )  PTT:32.8 sec      Urinalysis Basic - ( 26 Oct 2020 22:59 )    Color: Yellow / Appearance: Slightly Turbid / S.024 / pH: x  Gluc: x / Ketone: Trace  / Bili: Negative / Urobili: Negative   Blood: x / Protein: 100 / Nitrite: Negative   Leuk Esterase: Large / RBC: 13 /hpf /  /HPF   Sq Epi: x / Non Sq Epi: 2 /hpf / Bacteria: Few        Culture - Urine (collected 27 Oct 2020 02:36)  Source: .Urine Clean Catch (Midstream)  Preliminary Report (28 Oct 2020 08:32):    >100,000 CFU/ml Gram Negative Rods    Culture - Blood (collected 27 Oct 2020 01:34)  Source: .Blood Blood-Peripheral  Preliminary Report (28 Oct 2020 02:02):    No growth to date.    Culture - Blood (collected 27 Oct 2020 01:32)  Source: .Blood Blood-Peripheral  Preliminary Report (28 Oct 2020 02:02):    No growth to date.        RADIOLOGY & ADDITIONAL TESTS:  Results Reviewed:   Imaging Personally Reviewed:  Electrocardiogram Personally Reviewed:    COORDINATION OF CARE:  Care Discussed with Consultants/Other Providers [Y/N]:  Prior or Outpatient Records Reviewed [Y/N]:

## 2020-10-29 LAB
ANION GAP SERPL CALC-SCNC: 10 MMOL/L — SIGNIFICANT CHANGE UP (ref 5–17)
APTT BLD: 26.9 SEC — LOW (ref 27.5–35.5)
BASOPHILS # BLD AUTO: 0.02 K/UL — SIGNIFICANT CHANGE UP (ref 0–0.2)
BASOPHILS NFR BLD AUTO: 0.4 % — SIGNIFICANT CHANGE UP (ref 0–2)
BUN SERPL-MCNC: 20 MG/DL — SIGNIFICANT CHANGE UP (ref 7–23)
CALCIUM SERPL-MCNC: 8.5 MG/DL — SIGNIFICANT CHANGE UP (ref 8.4–10.5)
CHLORIDE SERPL-SCNC: 103 MMOL/L — SIGNIFICANT CHANGE UP (ref 96–108)
CO2 SERPL-SCNC: 24 MMOL/L — SIGNIFICANT CHANGE UP (ref 22–31)
CREAT SERPL-MCNC: 1.55 MG/DL — HIGH (ref 0.5–1.3)
EOSINOPHIL # BLD AUTO: 0.14 K/UL — SIGNIFICANT CHANGE UP (ref 0–0.5)
EOSINOPHIL NFR BLD AUTO: 2.5 % — SIGNIFICANT CHANGE UP (ref 0–6)
FERRITIN SERPL-MCNC: 391 NG/ML — SIGNIFICANT CHANGE UP (ref 30–400)
FOLATE SERPL-MCNC: 14.2 NG/ML — SIGNIFICANT CHANGE UP
GLUCOSE BLDC GLUCOMTR-MCNC: 114 MG/DL — HIGH (ref 70–99)
GLUCOSE BLDC GLUCOMTR-MCNC: 170 MG/DL — HIGH (ref 70–99)
GLUCOSE BLDC GLUCOMTR-MCNC: 201 MG/DL — HIGH (ref 70–99)
GLUCOSE BLDC GLUCOMTR-MCNC: 277 MG/DL — HIGH (ref 70–99)
GLUCOSE SERPL-MCNC: 122 MG/DL — HIGH (ref 70–99)
HCT VFR BLD CALC: 31.4 % — LOW (ref 39–50)
HGB BLD-MCNC: 10 G/DL — LOW (ref 13–17)
IMM GRANULOCYTES NFR BLD AUTO: 0.4 % — SIGNIFICANT CHANGE UP (ref 0–1.5)
INR BLD: 1.18 RATIO — HIGH (ref 0.88–1.16)
IRON SATN MFR SERPL: 14 % — LOW (ref 16–55)
IRON SATN MFR SERPL: 26 UG/DL — LOW (ref 45–165)
LYMPHOCYTES # BLD AUTO: 1.13 K/UL — SIGNIFICANT CHANGE UP (ref 1–3.3)
LYMPHOCYTES # BLD AUTO: 20.3 % — SIGNIFICANT CHANGE UP (ref 13–44)
MAGNESIUM SERPL-MCNC: 1.9 MG/DL — SIGNIFICANT CHANGE UP (ref 1.6–2.6)
MCHC RBC-ENTMCNC: 27.6 PG — SIGNIFICANT CHANGE UP (ref 27–34)
MCHC RBC-ENTMCNC: 31.8 GM/DL — LOW (ref 32–36)
MCV RBC AUTO: 86.7 FL — SIGNIFICANT CHANGE UP (ref 80–100)
MONOCYTES # BLD AUTO: 0.81 K/UL — SIGNIFICANT CHANGE UP (ref 0–0.9)
MONOCYTES NFR BLD AUTO: 14.5 % — HIGH (ref 2–14)
NEUTROPHILS # BLD AUTO: 3.46 K/UL — SIGNIFICANT CHANGE UP (ref 1.8–7.4)
NEUTROPHILS NFR BLD AUTO: 61.9 % — SIGNIFICANT CHANGE UP (ref 43–77)
NRBC # BLD: 0 /100 WBCS — SIGNIFICANT CHANGE UP (ref 0–0)
PHOSPHATE SERPL-MCNC: 2.7 MG/DL — SIGNIFICANT CHANGE UP (ref 2.5–4.5)
PLATELET # BLD AUTO: 198 K/UL — SIGNIFICANT CHANGE UP (ref 150–400)
POTASSIUM SERPL-MCNC: 4 MMOL/L — SIGNIFICANT CHANGE UP (ref 3.5–5.3)
POTASSIUM SERPL-SCNC: 4 MMOL/L — SIGNIFICANT CHANGE UP (ref 3.5–5.3)
PROTHROM AB SERPL-ACNC: 14.1 SEC — HIGH (ref 10.6–13.6)
RBC # BLD: 3.62 M/UL — LOW (ref 4.2–5.8)
RBC # BLD: 3.62 M/UL — LOW (ref 4.2–5.8)
RBC # FLD: 14.8 % — HIGH (ref 10.3–14.5)
RETICS #: 42 K/UL — SIGNIFICANT CHANGE UP (ref 25–125)
RETICS/RBC NFR: 1.2 % — SIGNIFICANT CHANGE UP (ref 0.5–2.5)
SODIUM SERPL-SCNC: 137 MMOL/L — SIGNIFICANT CHANGE UP (ref 135–145)
TIBC SERPL-MCNC: 185 UG/DL — LOW (ref 220–430)
UIBC SERPL-MCNC: 159 UG/DL — SIGNIFICANT CHANGE UP (ref 110–370)
VIT B12 SERPL-MCNC: 744 PG/ML — SIGNIFICANT CHANGE UP (ref 232–1245)
WBC # BLD: 5.58 K/UL — SIGNIFICANT CHANGE UP (ref 3.8–10.5)
WBC # FLD AUTO: 5.58 K/UL — SIGNIFICANT CHANGE UP (ref 3.8–10.5)

## 2020-10-29 PROCEDURE — 99233 SBSQ HOSP IP/OBS HIGH 50: CPT

## 2020-10-29 PROCEDURE — 72192 CT PELVIS W/O DYE: CPT | Mod: 26

## 2020-10-29 PROCEDURE — 99223 1ST HOSP IP/OBS HIGH 75: CPT | Mod: GC

## 2020-10-29 PROCEDURE — 51702 INSERT TEMP BLADDER CATH: CPT

## 2020-10-29 PROCEDURE — 71250 CT THORAX DX C-: CPT | Mod: 26

## 2020-10-29 RX ORDER — ACETAMINOPHEN 500 MG
650 TABLET ORAL ONCE
Refills: 0 | Status: COMPLETED | OUTPATIENT
Start: 2020-10-29 | End: 2020-10-29

## 2020-10-29 RX ADMIN — Medication 1: at 13:30

## 2020-10-29 RX ADMIN — ATORVASTATIN CALCIUM 20 MILLIGRAM(S): 80 TABLET, FILM COATED ORAL at 21:57

## 2020-10-29 RX ADMIN — TAMSULOSIN HYDROCHLORIDE 0.4 MILLIGRAM(S): 0.4 CAPSULE ORAL at 21:57

## 2020-10-29 RX ADMIN — INSULIN GLARGINE 30 UNIT(S): 100 INJECTION, SOLUTION SUBCUTANEOUS at 22:00

## 2020-10-29 RX ADMIN — Medication 2: at 17:26

## 2020-10-29 RX ADMIN — CILOSTAZOL 50 MILLIGRAM(S): 100 TABLET ORAL at 17:26

## 2020-10-29 RX ADMIN — Medication 1 PACKET(S): at 06:28

## 2020-10-29 RX ADMIN — CARVEDILOL PHOSPHATE 6.25 MILLIGRAM(S): 80 CAPSULE, EXTENDED RELEASE ORAL at 06:28

## 2020-10-29 RX ADMIN — CILOSTAZOL 50 MILLIGRAM(S): 100 TABLET ORAL at 06:28

## 2020-10-29 RX ADMIN — HEPARIN SODIUM 5000 UNIT(S): 5000 INJECTION INTRAVENOUS; SUBCUTANEOUS at 21:51

## 2020-10-29 RX ADMIN — Medication 1: at 21:50

## 2020-10-29 RX ADMIN — HEPARIN SODIUM 5000 UNIT(S): 5000 INJECTION INTRAVENOUS; SUBCUTANEOUS at 06:30

## 2020-10-29 RX ADMIN — Medication 81 MILLIGRAM(S): at 13:32

## 2020-10-29 RX ADMIN — PIPERACILLIN AND TAZOBACTAM 25 GRAM(S): 4; .5 INJECTION, POWDER, LYOPHILIZED, FOR SOLUTION INTRAVENOUS at 06:28

## 2020-10-29 RX ADMIN — PIPERACILLIN AND TAZOBACTAM 25 GRAM(S): 4; .5 INJECTION, POWDER, LYOPHILIZED, FOR SOLUTION INTRAVENOUS at 21:56

## 2020-10-29 RX ADMIN — PIPERACILLIN AND TAZOBACTAM 25 GRAM(S): 4; .5 INJECTION, POWDER, LYOPHILIZED, FOR SOLUTION INTRAVENOUS at 15:43

## 2020-10-29 RX ADMIN — HEPARIN SODIUM 5000 UNIT(S): 5000 INJECTION INTRAVENOUS; SUBCUTANEOUS at 13:28

## 2020-10-29 RX ADMIN — CARVEDILOL PHOSPHATE 6.25 MILLIGRAM(S): 80 CAPSULE, EXTENDED RELEASE ORAL at 17:26

## 2020-10-29 NOTE — PROCEDURE NOTE - ADDITIONAL PROCEDURE DETAILS
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

## 2020-10-29 NOTE — CONSULT NOTE ADULT - SUBJECTIVE AND OBJECTIVE BOX
CHIEF COMPLAINT: Sepsis secondary to UTI    HPI:    PAST MEDICAL & SURGICAL HISTORY:  BPH (benign prostatic hyperplasia)    Peripheral vascular disease    Diabetes mellitus    Hypercholesteremia    Hypertension    S/P cholecystectomy    Fracture of pelvis    S/P femoral-popliteal bypass surgery  Left    Cholecystitis    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    H/O colectomy  Partial        FAMILY HISTORY:  FH: diabetes mellitus  brother    FH: Alzheimers disease  mother     FH: pancreatic cancer  father         SOCIAL HISTORY:  Smoking: [ ] Never Smoked [ x] Former Smoker 25 pack-years  Substance Use: [ x] Never Used [ ] Used ____  EtOH Use: no alcohol abuse  Marital Status: [ ] Single [ ]  [ ]  [ ]   Sexual History:   Occupation:  Recent Travel:  Country of Birth:  Advance Directives:    Allergies    vancomycin (Nephrotoxicity)    Intolerances        HOME MEDICATIONS:  Home Medications:  Aspir 81 oral delayed release tablet: 1 tab(s) orally once a day (27 Oct 2020 08:35)  Basaglar KwikPen 100 units/mL subcutaneous solution: 35 unit(s) subcutaneous once a day (at bedtime) (27 Oct 2020 08:35)  cilostazol 50 mg oral tablet: 1 tab(s) orally 2 times a day (27 Oct 2020 08:35)  Coreg 6.25 mg oral tablet: 1 tab(s) orally 2 times a day (27 Oct 2020 08:35)  Livalo 4 mg oral tablet: 1 tab(s) orally once a day (27 Oct 2020 08:35)  losartan 25 mg oral tablet: 0.5 tab(s) orally once a day (27 Oct 2020 08:35)  Milk Thistle oral tablet: 250 milligram(s) orally once a day (27 Oct 2020 08:35)  NovoLOG 100 units/mL injectable solution: sliding scale (27 Oct 2020 08:35)  tamsulosin 0.4 mg oral capsule: 1 cap(s) orally once a day (27 Oct 2020 08:35)  Vascepa 1 g oral capsule: 1 cap(s) orally 2 times a day (27 Oct 2020 08:35)  Vitamin D3 50,000 intl units (1250 mcg) oral capsule: orally once a week (27 Oct 2020 08:35)  Zetia 10 mg oral tablet: 1 tab(s) orally once a day (27 Oct 2020 08:35)      REVIEW OF SYSTEMS:  Constitutional: [x ] negative [ ] fevers [ ] chills [ ] weight loss [ ] weight gain  HEENT: [x ] negative [ ] dry eyes [ ] eye irritation [ ] postnasal drip [ ] nasal congestion  CV: [x ] negative  [ ] chest pain [ ] orthopnea [ ] palpitations [ ] murmur  Resp: [ ] negative [x ] non-productive cough [ ] shortness of breath [ ] dyspnea [ ] wheezing [ ] sputum [ ] hemoptysis  GI: [x ] negative [ ] nausea [ ] vomiting [ ] diarrhea [ ] constipation [ ] abd pain [ ] dysphagia   : [ ] negative [x ] dysuria [ ] nocturia [ ] hematuria [ ] increased urinary frequency  Musculoskeletal: [x ] negative [ ] back pain [ ] myalgias [ ] arthralgias [ ] fracture  Skin: [ x] negative [ ] rash [ ] itch  Neurological: [x ] negative [ ] headache [ ] dizziness [ ] syncope [ ] weakness [ ] numbness  Psychiatric: [x ] negative [ ] anxiety [ ] depression  Endocrine: [x ] negative [ ] diabetes [ ] thyroid problem  Hematologic/Lymphatic: [x ] negative [ ] anemia [ ] bleeding problem  Allergic/Immunologic: [x ] negative [ ] itchy eyes [ ] nasal discharge [ ] hives [ ] angioedema  [ ] All other systems negative  [ ] Unable to assess ROS because ________    OBJECTIVE:  ICU Vital Signs Last 24 Hrs  T(C): 36.6 (29 Oct 2020 06:09), Max: 37.4 (28 Oct 2020 19:56)  T(F): 97.9 (29 Oct 2020 06:09), Max: 99.4 (28 Oct 2020 19:56)  HR: 85 (29 Oct 2020 06:09) (70 - 99)  BP: 154/70 (29 Oct 2020 06:09) (105/57 - 154/70)  BP(mean): --  ABP: --  ABP(mean): --  RR: 16 (29 Oct 2020 06:09) (16 - 18)  SpO2: 96% (29 Oct 2020 06:09) (96% - 100%)        10-28 @ 07:01  -  10-29 @ 07:00  --------------------------------------------------------  IN: 2365 mL / OUT: 1650 mL / NET: 715 mL      CAPILLARY BLOOD GLUCOSE      POCT Blood Glucose.: 114 mg/dL (29 Oct 2020 08:18)      PHYSICAL EXAM:  GENERAL: NAD, well-developed  HEAD:  Atraumatic, Normocephalic  EYES: EOMI, PERRLA, conjunctiva and sclera clear  NECK: Supple, No JVD  CHEST/LUNG: Clear to auscultation bilaterally; No wheeze  HEART: Regular rate and rhythm; No murmurs, rubs, or gallops  ABDOMEN: Soft, Nontender, Nondistended; Bowel sounds present  EXTREMITIES:  2+ Peripheral Pulses, No clubbing, cyanosis, or edema  PSYCH: AAOx3  NEUROLOGY: non-focal  SKIN: No rashes or lesions    HOSPITAL MEDICATIONS:  Standing Meds:  acetaminophen   Tablet .. 650 milliGRAM(s) Oral once  aspirin enteric coated 81 milliGRAM(s) Oral daily  atorvastatin 20 milliGRAM(s) Oral at bedtime  carvedilol 6.25 milliGRAM(s) Oral every 12 hours  cilostazol 50 milliGRAM(s) Oral two times a day  dextrose 5%. 1000 milliLiter(s) IV Continuous <Continuous>  dextrose 50% Injectable 12.5 Gram(s) IV Push once  dextrose 50% Injectable 25 Gram(s) IV Push once  dextrose 50% Injectable 25 Gram(s) IV Push once  heparin   Injectable 5000 Unit(s) SubCutaneous every 8 hours  insulin glargine Injectable (LANTUS) 30 Unit(s) SubCutaneous at bedtime  insulin lispro (ADMELOG) corrective regimen sliding scale   SubCutaneous three times a day before meals  insulin lispro (ADMELOG) corrective regimen sliding scale   SubCutaneous at bedtime  piperacillin/tazobactam IVPB.. 3.375 Gram(s) IV Intermittent every 8 hours  tamsulosin 0.4 milliGRAM(s) Oral at bedtime      PRN Meds:  dextrose 40% Gel 15 Gram(s) Oral once PRN  glucagon  Injectable 1 milliGRAM(s) IntraMuscular once PRN      LABS:                        10.0   5.58  )-----------( 198      ( 29 Oct 2020 07:07 )             31.4     Hgb Trend: 10.0<--, 9.5<--, 10.6<--, 12.1<--  10-29    137  |  103  |  20  ----------------------------<  122<H>  4.0   |  24  |  1.55<H>    Ca    8.5      29 Oct 2020 07:06  Phos  2.7     10-  Mg     1.9     10-      Creatinine Trend: 1.55<--, 1.46<--, 1.25<--, 1.57<--            MICROBIOLOGY:     Culture - Urine (collected 27 Oct 2020 02:36)  Source: .Urine Clean Catch (Midstream)  Preliminary Report (28 Oct 2020 08:32):    >100,000 CFU/ml Gram Negative Rods    Culture - Blood (collected 27 Oct 2020 01:34)  Source: .Blood Blood-Peripheral  Preliminary Report (28 Oct 2020 02:02):    No growth to date.    Culture - Blood (collected 27 Oct 2020 01:32)  Source: .Blood Blood-Peripheral  Preliminary Report (28 Oct 2020 02:02):    No growth to date.        RADIOLOGY:  [ ] Reviewed and interpreted by me    PULMONARY FUNCTION TESTS:    EKG:   CHIEF COMPLAINT: Sepsis secondary to UTI    HPI: The patient is a 79 year old man with a history of lung cancer s/p resection at [facility], HTN, HLD, IDDM2 sent in by PMD for refractory urinary symptoms and fever.  Patient reports he has been having burning pain upon urination, for 1 month associated with slight nausea and "dribbles".  Patient was evaluated by PMD and treated initially with 1 week course of Cipro, then a week later completion followed by 1 week of Augmentin, without any improvement in dysuria and itching.  Patient went to PMD for f/u and found to have fever of 102 F, sent in by PMD for IV Abx.  Denies any CP, SOB, cough, abdominal pain, vomiting, diarrhea, constipation, bloody stools. Denies any penile discharge, testicular pain/swelling or recent urological procedure.     Of note the patient also has had a nonproductive cough for the past 3 weeks in the setting of the UTI. He has no knowledge of prior pleural effusions. He is a former smoker, quit 15 years ago. The cough has been getting worse over the past 3 weeks. He has no dyspnea, shortness of breath. Talking makes it worse.     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 Pulmonary consulted for right pleural effusion.     PAST MEDICAL & SURGICAL HISTORY:  BPH (benign prostatic hyperplasia)    Peripheral vascular disease    Diabetes mellitus    Hypercholesteremia    Hypertension    S/P cholecystectomy    Fracture of pelvis    S/P femoral-popliteal bypass surgery  Left    Cholecystitis    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    H/O colectomy  Partial        FAMILY HISTORY:  FH: diabetes mellitus  brother    FH: Alzheimers disease  mother     FH: pancreatic cancer  father         SOCIAL HISTORY:  Smoking: [ ] Never Smoked [ x] Former Smoker 25 pack-years  Substance Use: [ x] Never Used [ ] Used ____  EtOH Use: no alcohol abuse  Marital Status: [ ] Single [ ]  [ ]  [ ]   Sexual History:   Occupation:  Recent Travel:  Country of Birth:  Advance Directives:    Allergies    vancomycin (Nephrotoxicity)    Intolerances        HOME MEDICATIONS:  Home Medications:  Aspir 81 oral delayed release tablet: 1 tab(s) orally once a day (27 Oct 2020 08:35)  Basaglar KwikPen 100 units/mL subcutaneous solution: 35 unit(s) subcutaneous once a day (at bedtime) (27 Oct 2020 08:35)  cilostazol 50 mg oral tablet: 1 tab(s) orally 2 times a day (27 Oct 2020 08:35)  Coreg 6.25 mg oral tablet: 1 tab(s) orally 2 times a day (27 Oct 2020 08:35)  Livalo 4 mg oral tablet: 1 tab(s) orally once a day (27 Oct 2020 08:35)  losartan 25 mg oral tablet: 0.5 tab(s) orally once a day (27 Oct 2020 08:35)  Milk Thistle oral tablet: 250 milligram(s) orally once a day (27 Oct 2020 08:35)  NovoLOG 100 units/mL injectable solution: sliding scale (27 Oct 2020 08:35)  tamsulosin 0.4 mg oral capsule: 1 cap(s) orally once a day (27 Oct 2020 08:)  Vascepa 1 g oral capsule: 1 cap(s) orally 2 times a day (27 Oct 2020 08:35)  Vitamin D3 50,000 intl units (1250 mcg) oral capsule: orally once a week (27 Oct 2020 08:35)  Zetia 10 mg oral tablet: 1 tab(s) orally once a day (27 Oct 2020 08:35)      REVIEW OF SYSTEMS:  Constitutional: [x ] negative [ ] fevers [ ] chills [ ] weight loss [ ] weight gain  HEENT: [x ] negative [ ] dry eyes [ ] eye irritation [ ] postnasal drip [ ] nasal congestion  CV: [x ] negative  [ ] chest pain [ ] orthopnea [ ] palpitations [ ] murmur  Resp: [ ] negative [x ] non-productive cough [ ] shortness of breath [ ] dyspnea [ ] wheezing [ ] sputum [ ] hemoptysis  GI: [x ] negative [ ] nausea [ ] vomiting [ ] diarrhea [ ] constipation [ ] abd pain [ ] dysphagia   : [ ] negative [x ] dysuria [ ] nocturia [ ] hematuria [ ] increased urinary frequency  Musculoskeletal: [x ] negative [ ] back pain [ ] myalgias [ ] arthralgias [ ] fracture  Skin: [ x] negative [ ] rash [ ] itch  Neurological: [x ] negative [ ] headache [ ] dizziness [ ] syncope [ ] weakness [ ] numbness  Psychiatric: [x ] negative [ ] anxiety [ ] depression  Endocrine: [x ] negative [ ] diabetes [ ] thyroid problem  Hematologic/Lymphatic: [x ] negative [ ] anemia [ ] bleeding problem  Allergic/Immunologic: [x ] negative [ ] itchy eyes [ ] nasal discharge [ ] hives [ ] angioedema  [ ] All other systems negative  [ ] Unable to assess ROS because ________    OBJECTIVE:  ICU Vital Signs Last 24 Hrs  T(C): 36.6 (29 Oct 2020 06:09), Max: 37.4 (28 Oct 2020 19:56)  T(F): 97.9 (29 Oct 2020 06:09), Max: 99.4 (28 Oct 2020 19:56)  HR: 85 (29 Oct 2020 06:09) (70 - 99)  BP: 154/70 (29 Oct 2020 06:09) (105/57 - 154/70)  BP(mean): --  ABP: --  ABP(mean): --  RR: 16 (29 Oct 2020 06:09) (16 - 18)  SpO2: 96% (29 Oct 2020 06:09) (96% - 100%)        10-28 @ 07:01  -  10-29 @ 07:00  --------------------------------------------------------  IN: 2365 mL / OUT: 1650 mL / NET: 715 mL      CAPILLARY BLOOD GLUCOSE      POCT Blood Glucose.: 114 mg/dL (29 Oct 2020 08:18)      PHYSICAL EXAM:  GENERAL: NAD, well-developed  HEAD:  Atraumatic, Normocephalic  EYES: EOMI, PERRLA, conjunctiva and sclera clear  NECK: Supple, No JVD  CHEST/LUNG: Clear to auscultation bilaterally; No wheeze  HEART: Regular rate and rhythm; No murmurs, rubs, or gallops  ABDOMEN: Soft, Nontender, Nondistended; Bowel sounds present  EXTREMITIES:  2+ Peripheral Pulses, No clubbing, cyanosis, or edema  PSYCH: AAOx3  NEUROLOGY: non-focal  SKIN: No rashes or lesions  POCUS: loculated right sided pleural effusion with septations     HOSPITAL MEDICATIONS:  Standing Meds:  acetaminophen   Tablet .. 650 milliGRAM(s) Oral once  aspirin enteric coated 81 milliGRAM(s) Oral daily  atorvastatin 20 milliGRAM(s) Oral at bedtime  carvedilol 6.25 milliGRAM(s) Oral every 12 hours  cilostazol 50 milliGRAM(s) Oral two times a day  dextrose 5%. 1000 milliLiter(s) IV Continuous <Continuous>  dextrose 50% Injectable 12.5 Gram(s) IV Push once  dextrose 50% Injectable 25 Gram(s) IV Push once  dextrose 50% Injectable 25 Gram(s) IV Push once  heparin   Injectable 5000 Unit(s) SubCutaneous every 8 hours  insulin glargine Injectable (LANTUS) 30 Unit(s) SubCutaneous at bedtime  insulin lispro (ADMELOG) corrective regimen sliding scale   SubCutaneous three times a day before meals  insulin lispro (ADMELOG) corrective regimen sliding scale   SubCutaneous at bedtime  piperacillin/tazobactam IVPB.. 3.375 Gram(s) IV Intermittent every 8 hours  tamsulosin 0.4 milliGRAM(s) Oral at bedtime      PRN Meds:  dextrose 40% Gel 15 Gram(s) Oral once PRN  glucagon  Injectable 1 milliGRAM(s) IntraMuscular once PRN      LABS:                        10.0   5.58  )-----------( 198      ( 29 Oct 2020 07:07 )             31.4     Hgb Trend: 10.0<--, 9.5<--, 10.6<--, 12.1<--  10-29    137  |  103  |  20  ----------------------------<  122<H>  4.0   |  24  |  1.55<H>    Ca    8.5      29 Oct 2020 07:06  Phos  2.7     10-29  Mg     1.9     10-29      Creatinine Trend: 1.55<--, 1.46<--, 1.25<--, 1.57<--            MICROBIOLOGY:     Culture - Urine (collected 27 Oct 2020 02:36)  Source: .Urine Clean Catch (Midstream)  Preliminary Report (28 Oct 2020 08:32):    >100,000 CFU/ml Gram Negative Rods    Culture - Blood (collected 27 Oct 2020 01:34)  Source: .Blood Blood-Peripheral  Preliminary Report (28 Oct 2020 02:02):    No growth to date.    Culture - Blood (collected 27 Oct 2020 01:32)  Source: .Blood Blood-Peripheral  Preliminary Report (28 Oct 2020 02:02):    No growth to date.        RADIOLOGY:  [ ] Reviewed and interpreted by me    PULMONARY FUNCTION TESTS:    EKG:

## 2020-10-29 NOTE — PROGRESS NOTE ADULT - SUBJECTIVE AND OBJECTIVE BOX
[facility] Division of Hospital Medicine  Gayatri Mercer MD  Pager (M-F, 1H-8D): [phone]  Other Times:  [phone]    Patient is a 79y old  Male who presents with a chief complaint of fever and urinary symptoms (29 Oct 2020 10:31)      SUBJECTIVE / OVERNIGHT EVENTS:  had kaye placed overnight.  dysuria had improved prior but worsened with placement for the ct  no further itching  also still with cough induced by talking    ADDITIONAL REVIEW OF SYSTEMS:    MEDICATIONS  (STANDING):  aspirin enteric coated 81 milliGRAM(s) Oral daily  atorvastatin 20 milliGRAM(s) Oral at bedtime  carvedilol 6.25 milliGRAM(s) Oral every 12 hours  cilostazol 50 milliGRAM(s) Oral two times a day  dextrose 5%. 1000 milliLiter(s) (50 mL/Hr) IV Continuous <Continuous>  dextrose 50% Injectable 12.5 Gram(s) IV Push once  dextrose 50% Injectable 25 Gram(s) IV Push once  dextrose 50% Injectable 25 Gram(s) IV Push once  heparin   Injectable 5000 Unit(s) SubCutaneous every 8 hours  insulin glargine Injectable (LANTUS) 30 Unit(s) SubCutaneous at bedtime  insulin lispro (ADMELOG) corrective regimen sliding scale   SubCutaneous three times a day before meals  insulin lispro (ADMELOG) corrective regimen sliding scale   SubCutaneous at bedtime  piperacillin/tazobactam IVPB.. 3.375 Gram(s) IV Intermittent every 8 hours  tamsulosin 0.4 milliGRAM(s) Oral at bedtime    MEDICATIONS  (PRN):  dextrose 40% Gel 15 Gram(s) Oral once PRN Blood Glucose LESS THAN 70 milliGRAM(s)/deciliter  glucagon  Injectable 1 milliGRAM(s) IntraMuscular once PRN Glucose LESS THAN 70 milligrams/deciliter      CAPILLARY BLOOD GLUCOSE      POCT Blood Glucose.: 170 mg/dL (29 Oct 2020 13:23)  POCT Blood Glucose.: 114 mg/dL (29 Oct 2020 08:18)  POCT Blood Glucose.: 240 mg/dL (28 Oct 2020 22:17)  POCT Blood Glucose.: 194 mg/dL (28 Oct 2020 17:11)    I&O's Summary    28 Oct 2020 07:01  -  29 Oct 2020 07:00  --------------------------------------------------------  IN: 2365 mL / OUT: 1650 mL / NET: 715 mL    29 Oct 2020 07:01  -  29 Oct 2020 15:46  --------------------------------------------------------  IN: 240 mL / OUT: 350 mL / NET: -110 mL        PHYSICAL EXAM:  Vital Signs Last 24 Hrs  T(C): 36.4 (29 Oct 2020 14:01), Max: 37.4 (28 Oct 2020 19:56)  T(F): 97.6 (29 Oct 2020 14:01), Max: 99.4 (28 Oct 2020 19:56)  HR: 76 (29 Oct 2020 14:01) (72 - 99)  BP: 151/69 (29 Oct 2020 14:01) (111/60 - 154/70)  BP(mean): --  RR: 18 (29 Oct 2020 14:01) (16 - 18)  SpO2: 97% (29 Oct 2020 14:01) (96% - 99%)  CONSTITUTIONAL: NAD, well-developed, well-groomed  EYES: conjunctiva and sclera clear  ENMT: Moist oral mucosa  NECK: Supple  RESPIRATORY: Normal respiratory effort; lungs are clear to auscultation bilaterally, no wheeze/crackles  CARDIOVASCULAR: Regular rate and rhythm, normal S1 and S2  ABDOMEN: Nontender to palpation, normoactive bowel sounds, no rebound/guarding; No hepatosplenomegaly, no CVAT  PSYCH: A+O to person, place, and time; affect appropriate  NEUROLOGY: normal strength b/l LE, no spinal tenderness    LABS:                        10.0   5.58  )-----------( 198      ( 29 Oct 2020 07:07 )             31.4     10-29    137  |  103  |  20  ----------------------------<  122<H>  4.0   |  24  |  1.55<H>    Ca    8.5      29 Oct 2020 07:06  Phos  2.7     10-29  Mg     1.9     10-29      PT/INR - ( 29 Oct 2020 11:42 )   PT: 14.1 sec;   INR: 1.18 ratio         PTT - ( 29 Oct 2020 11:42 )  PTT:26.9 sec          Culture - Urine (collected 27 Oct 2020 02:36)  Source: .Urine Clean Catch (Midstream)  Preliminary Report (29 Oct 2020 12:54):    >100,000 CFU/ml Klebsiella pneumoniae    >100,000 CFU/ml Escherichia coli    Culture - Blood (collected 27 Oct 2020 01:34)  Source: .Blood Blood-Peripheral  Preliminary Report (28 Oct 2020 02:02):    No growth to date.    Culture - Blood (collected 27 Oct 2020 01:32)  Source: .Blood Blood-Peripheral  Preliminary Report (28 Oct 2020 02:02):    No growth to date.        RADIOLOGY & ADDITIONAL TESTS:  Results Reviewed:   < from: CT Chest No Cont (10.29.20 @ 12:42) >  IMPRESSION: Small loculated heterogeneous exudative right pleural effusion with right pleural thickening. Please note evaluation of the pleural surface is limiteddue to lack of intravenous contrast. Contrast-enhanced chest CT can be performed for complete evaluation given the history of lung cancer.    Status post right upper lobectomy with postoperative changes.    Small patchy right apical opacity is nonspecific. Differential diagnosis include but is not limited to infection or postoperative change. Comparison with more recent postoperative prior studies would be helpful for further evaluation and if images are made available, an addendum can be issued.Alternatively a 3 month follow-up noncontrast chest CT can be performed for further evaluation.    < end of copied text >    Imaging Personally Reviewed:  Electrocardiogram Personally Reviewed:    COORDINATION OF CARE:  Care Discussed with Consultants/Other Providers [Y/N]:  Prior or Outpatient Records Reviewed [Y/N]:

## 2020-10-29 NOTE — PROCEDURE NOTE - NSURITECHNIQUE_GU_A_CORE
The site was cleaned with soap/water and sterile solution (betadine)/The catheter was secured with a securement device (e.g. StatLock)/Proper hand hygiene was performed/Sterile gloves were worn for the duration of the procedure/A sterile drape was used to cover all adjacent areas/The catheter was appropriately lubricated

## 2020-10-29 NOTE — CONSULT NOTE ADULT - ASSESSMENT
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 pulmonary consulted for cough in the setting of right pleural effusion concerning for empyema vs. malignant effusion.       #pleural effusion  - The patient reports having a cough for 3 weeks. on admission found to have right sided pleural effusion with unspecified chronicity.   - PCP mentioned that on CXR from about 1 year ago the patient had a small right sided effusion, unclear what prior CT findings were.   - POCUS showed right sided effusion with septations concerning for empyema vs. malignant effusion in the setting of sepsis secondary to UTI as well as prior malignancy.   - Will attempt to do thoracentesis.   - Please get CT scan with IV contrast.   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 pulmonary consulted for cough in the setting of right pleural effusion concerning for empyema vs. malignant effusion vs. post-surgical changes.       #pleural effusion  - The patient reports having a cough for 3 weeks. on admission found to have right sided pleural effusion with unspecified chronicity.  - PCP mentioned that on CXR from about 1 year ago the patient had a small right sided effusion, unclear what prior CT findings were.   - unclear what kind of lung procedure the patient had if the patient had any form of pleurodesis as part of lung resection.   - POCUS showed right sided effusion with septations concerning for post surgical changes vs. empyema vs. malignant effusion in the setting of sepsis secondary to UTI as well as prior malignancy.   - Will attempt to do diagnostic thoracentesis likely on 10/30.   - Please get CT scan with IV contrast.   - Please assist in getting records about procedure, will reach out to primary doctor again.

## 2020-10-30 DIAGNOSIS — D50.9 IRON DEFICIENCY ANEMIA, UNSPECIFIED: ICD-10-CM

## 2020-10-30 LAB
-  AMIKACIN: SIGNIFICANT CHANGE UP
-  AMIKACIN: SIGNIFICANT CHANGE UP
-  AMOXICILLIN/CLAVULANIC ACID: SIGNIFICANT CHANGE UP
-  AMOXICILLIN/CLAVULANIC ACID: SIGNIFICANT CHANGE UP
-  AMPICILLIN/SULBACTAM: SIGNIFICANT CHANGE UP
-  AMPICILLIN/SULBACTAM: SIGNIFICANT CHANGE UP
-  AMPICILLIN: SIGNIFICANT CHANGE UP
-  AMPICILLIN: SIGNIFICANT CHANGE UP
-  AZTREONAM: SIGNIFICANT CHANGE UP
-  AZTREONAM: SIGNIFICANT CHANGE UP
-  CEFAZOLIN: SIGNIFICANT CHANGE UP
-  CEFAZOLIN: SIGNIFICANT CHANGE UP
-  CEFEPIME: SIGNIFICANT CHANGE UP
-  CEFEPIME: SIGNIFICANT CHANGE UP
-  CEFOXITIN: SIGNIFICANT CHANGE UP
-  CEFOXITIN: SIGNIFICANT CHANGE UP
-  CEFTRIAXONE: SIGNIFICANT CHANGE UP
-  CEFTRIAXONE: SIGNIFICANT CHANGE UP
-  CIPROFLOXACIN: SIGNIFICANT CHANGE UP
-  CIPROFLOXACIN: SIGNIFICANT CHANGE UP
-  ERTAPENEM: SIGNIFICANT CHANGE UP
-  ERTAPENEM: SIGNIFICANT CHANGE UP
-  GENTAMICIN: SIGNIFICANT CHANGE UP
-  GENTAMICIN: SIGNIFICANT CHANGE UP
-  IMIPENEM: SIGNIFICANT CHANGE UP
-  IMIPENEM: SIGNIFICANT CHANGE UP
-  LEVOFLOXACIN: SIGNIFICANT CHANGE UP
-  LEVOFLOXACIN: SIGNIFICANT CHANGE UP
-  MEROPENEM: SIGNIFICANT CHANGE UP
-  MEROPENEM: SIGNIFICANT CHANGE UP
-  NITROFURANTOIN: SIGNIFICANT CHANGE UP
-  NITROFURANTOIN: SIGNIFICANT CHANGE UP
-  PIPERACILLIN/TAZOBACTAM: SIGNIFICANT CHANGE UP
-  PIPERACILLIN/TAZOBACTAM: SIGNIFICANT CHANGE UP
-  TIGECYCLINE: SIGNIFICANT CHANGE UP
-  TIGECYCLINE: SIGNIFICANT CHANGE UP
-  TOBRAMYCIN: SIGNIFICANT CHANGE UP
-  TOBRAMYCIN: SIGNIFICANT CHANGE UP
-  TRIMETHOPRIM/SULFAMETHOXAZOLE: SIGNIFICANT CHANGE UP
-  TRIMETHOPRIM/SULFAMETHOXAZOLE: SIGNIFICANT CHANGE UP
ANION GAP SERPL CALC-SCNC: 10 MMOL/L — SIGNIFICANT CHANGE UP (ref 5–17)
BUN SERPL-MCNC: 18 MG/DL — SIGNIFICANT CHANGE UP (ref 7–23)
CALCIUM SERPL-MCNC: 9 MG/DL — SIGNIFICANT CHANGE UP (ref 8.4–10.5)
CHLORIDE SERPL-SCNC: 105 MMOL/L — SIGNIFICANT CHANGE UP (ref 96–108)
CO2 SERPL-SCNC: 25 MMOL/L — SIGNIFICANT CHANGE UP (ref 22–31)
CREAT SERPL-MCNC: 1.29 MG/DL — SIGNIFICANT CHANGE UP (ref 0.5–1.3)
CULTURE RESULTS: SIGNIFICANT CHANGE UP
GLUCOSE BLDC GLUCOMTR-MCNC: 122 MG/DL — HIGH (ref 70–99)
GLUCOSE BLDC GLUCOMTR-MCNC: 162 MG/DL — HIGH (ref 70–99)
GLUCOSE BLDC GLUCOMTR-MCNC: 215 MG/DL — HIGH (ref 70–99)
GLUCOSE BLDC GLUCOMTR-MCNC: 233 MG/DL — HIGH (ref 70–99)
GLUCOSE SERPL-MCNC: 127 MG/DL — HIGH (ref 70–99)
HCT VFR BLD CALC: 30.3 % — LOW (ref 39–50)
HGB BLD-MCNC: 9.7 G/DL — LOW (ref 13–17)
MCHC RBC-ENTMCNC: 27.5 PG — SIGNIFICANT CHANGE UP (ref 27–34)
MCHC RBC-ENTMCNC: 32 GM/DL — SIGNIFICANT CHANGE UP (ref 32–36)
MCV RBC AUTO: 85.8 FL — SIGNIFICANT CHANGE UP (ref 80–100)
METHOD TYPE: SIGNIFICANT CHANGE UP
METHOD TYPE: SIGNIFICANT CHANGE UP
NRBC # BLD: 0 /100 WBCS — SIGNIFICANT CHANGE UP (ref 0–0)
ORGANISM # SPEC MICROSCOPIC CNT: SIGNIFICANT CHANGE UP
PLATELET # BLD AUTO: 200 K/UL — SIGNIFICANT CHANGE UP (ref 150–400)
POTASSIUM SERPL-MCNC: 3.8 MMOL/L — SIGNIFICANT CHANGE UP (ref 3.5–5.3)
POTASSIUM SERPL-SCNC: 3.8 MMOL/L — SIGNIFICANT CHANGE UP (ref 3.5–5.3)
RBC # BLD: 3.53 M/UL — LOW (ref 4.2–5.8)
RBC # FLD: 14.8 % — HIGH (ref 10.3–14.5)
SARS-COV-2 IGG SERPL QL IA: NEGATIVE — SIGNIFICANT CHANGE UP
SARS-COV-2 IGM SERPL IA-ACNC: <3.8 AU/ML — SIGNIFICANT CHANGE UP
SODIUM SERPL-SCNC: 140 MMOL/L — SIGNIFICANT CHANGE UP (ref 135–145)
SPECIMEN SOURCE: SIGNIFICANT CHANGE UP
WBC # BLD: 5.24 K/UL — SIGNIFICANT CHANGE UP (ref 3.8–10.5)
WBC # FLD AUTO: 5.24 K/UL — SIGNIFICANT CHANGE UP (ref 3.8–10.5)

## 2020-10-30 PROCEDURE — 99233 SBSQ HOSP IP/OBS HIGH 50: CPT

## 2020-10-30 PROCEDURE — 99223 1ST HOSP IP/OBS HIGH 75: CPT

## 2020-10-30 PROCEDURE — 99233 SBSQ HOSP IP/OBS HIGH 50: CPT | Mod: GC

## 2020-10-30 RX ORDER — INSULIN GLARGINE 100 [IU]/ML
20 INJECTION, SOLUTION SUBCUTANEOUS AT BEDTIME
Refills: 0 | Status: DISCONTINUED | OUTPATIENT
Start: 2020-10-30 | End: 2020-11-02

## 2020-10-30 RX ORDER — INSULIN LISPRO 100/ML
5 VIAL (ML) SUBCUTANEOUS
Refills: 0 | Status: DISCONTINUED | OUTPATIENT
Start: 2020-10-30 | End: 2020-11-02

## 2020-10-30 RX ORDER — FERROUS SULFATE 325(65) MG
325 TABLET ORAL DAILY
Refills: 0 | Status: DISCONTINUED | OUTPATIENT
Start: 2020-10-30 | End: 2020-11-02

## 2020-10-30 RX ORDER — LOSARTAN POTASSIUM 100 MG/1
12.5 TABLET, FILM COATED ORAL DAILY
Refills: 0 | Status: DISCONTINUED | OUTPATIENT
Start: 2020-10-30 | End: 2020-11-02

## 2020-10-30 RX ORDER — ASCORBIC ACID 60 MG
500 TABLET,CHEWABLE ORAL DAILY
Refills: 0 | Status: DISCONTINUED | OUTPATIENT
Start: 2020-10-30 | End: 2020-11-02

## 2020-10-30 RX ORDER — ALBUTEROL 90 UG/1
1 AEROSOL, METERED ORAL EVERY 4 HOURS
Refills: 0 | Status: DISCONTINUED | OUTPATIENT
Start: 2020-10-30 | End: 2020-11-02

## 2020-10-30 RX ORDER — ALBUTEROL 90 UG/1
2.5 AEROSOL, METERED ORAL EVERY 6 HOURS
Refills: 0 | Status: DISCONTINUED | OUTPATIENT
Start: 2020-10-30 | End: 2020-11-02

## 2020-10-30 RX ADMIN — ALBUTEROL 2.5 MILLIGRAM(S): 90 AEROSOL, METERED ORAL at 17:42

## 2020-10-30 RX ADMIN — CARVEDILOL PHOSPHATE 6.25 MILLIGRAM(S): 80 CAPSULE, EXTENDED RELEASE ORAL at 17:42

## 2020-10-30 RX ADMIN — Medication 2: at 17:42

## 2020-10-30 RX ADMIN — CILOSTAZOL 50 MILLIGRAM(S): 100 TABLET ORAL at 06:06

## 2020-10-30 RX ADMIN — ATORVASTATIN CALCIUM 20 MILLIGRAM(S): 80 TABLET, FILM COATED ORAL at 21:41

## 2020-10-30 RX ADMIN — HEPARIN SODIUM 5000 UNIT(S): 5000 INJECTION INTRAVENOUS; SUBCUTANEOUS at 06:05

## 2020-10-30 RX ADMIN — Medication 81 MILLIGRAM(S): at 12:42

## 2020-10-30 RX ADMIN — Medication 2: at 12:42

## 2020-10-30 RX ADMIN — HEPARIN SODIUM 5000 UNIT(S): 5000 INJECTION INTRAVENOUS; SUBCUTANEOUS at 21:41

## 2020-10-30 RX ADMIN — Medication 500 MILLIGRAM(S): at 15:46

## 2020-10-30 RX ADMIN — CILOSTAZOL 50 MILLIGRAM(S): 100 TABLET ORAL at 17:42

## 2020-10-30 RX ADMIN — ALBUTEROL 2.5 MILLIGRAM(S): 90 AEROSOL, METERED ORAL at 23:01

## 2020-10-30 RX ADMIN — HEPARIN SODIUM 5000 UNIT(S): 5000 INJECTION INTRAVENOUS; SUBCUTANEOUS at 14:25

## 2020-10-30 RX ADMIN — PIPERACILLIN AND TAZOBACTAM 25 GRAM(S): 4; .5 INJECTION, POWDER, LYOPHILIZED, FOR SOLUTION INTRAVENOUS at 06:06

## 2020-10-30 RX ADMIN — LOSARTAN POTASSIUM 12.5 MILLIGRAM(S): 100 TABLET, FILM COATED ORAL at 15:46

## 2020-10-30 RX ADMIN — INSULIN GLARGINE 20 UNIT(S): 100 INJECTION, SOLUTION SUBCUTANEOUS at 21:41

## 2020-10-30 RX ADMIN — Medication 325 MILLIGRAM(S): at 15:46

## 2020-10-30 RX ADMIN — CARVEDILOL PHOSPHATE 6.25 MILLIGRAM(S): 80 CAPSULE, EXTENDED RELEASE ORAL at 06:06

## 2020-10-30 RX ADMIN — Medication 5 UNIT(S): at 17:42

## 2020-10-30 RX ADMIN — TAMSULOSIN HYDROCHLORIDE 0.4 MILLIGRAM(S): 0.4 CAPSULE ORAL at 21:41

## 2020-10-30 NOTE — CONSULT NOTE ADULT - ASSESSMENT
Assessment:  The patient is a 79 year old male with past medical history of hyperlipidemia, BPH, PVD, lung cancer s/p resection, hypertension, and type 2 diabetes mellitus sent in by PMD for refractory urinary symptoms and fever.  Patient reports he has been having burning pain upon urination for 1 month associated with slight nausea and "dribbling". He was found to have Klebsiella ESBL and E. coli in urine culture. Infectious disease was consulted for sepsis in the setting of urinary tract infection and colovesicular fistula.    Plan:  # Sepsis due to Klebsiella ESBL and E. coli in the setting of colovesicular fistula  - Discontinue intravenous piperacillin-tazobactam and switch to intravenous ertapenem  - Follow final blood cultures  - Monitor fever curve  - Trend CBC daily  - ID will continue to follow    Arash Jacob MD PGY-4   Fellow, Infectious Diseases   Pager: [phone]  If no response, after 5pm and on weekends: Call [phone]     Assessment:  The patient is a 79 year old male with past medical history of hyperlipidemia, BPH, PVD, lung cancer s/p resection, hypertension, and type 2 diabetes mellitus sent in by PMD for refractory urinary symptoms and fever.  Patient reports he has been having burning pain upon urination for 1 month associated with slight nausea and "dribbling". He was found to have Klebsiella ESBL and E. coli in urine culture. Infectious disease was consulted for sepsis in the setting of urinary tract infection and colovesicular fistula.    Plan:  # Sepsis due to Klebsiella ESBL and E. coli in the setting of colovesicular fistula  - Discontinue intravenous piperacillin-tazobactam and switch to intravenous ertapenem  - IR consult for PICC line placement   - Follow final blood cultures  - Monitor fever curve  - Trend CBC daily  - ID will continue to follow  - Case discussed with primary team     Arash Jacob MD PGY-4   Fellow, Infectious Diseases   Pager: [phone]  If no response, after 5pm and on weekends: Call [phone]

## 2020-10-30 NOTE — CONSULT NOTE ADULT - SUBJECTIVE AND OBJECTIVE BOX
COLORECTAL SURGERY CONSULT NOTE  ZAK DONAHUE  |  [id number]  |  10-30-20 @ 14:11    CC: Patient is a 79y old  Male who presents with a chief complaint of fever and urinary symptoms     HPI: 79M PMH HTN, HLD, IDDM2 sent in by PMD for refractory urinary symptoms and fever.  Patient reported he has been having burning pain upon urination, for 1 month associated with slight nausea and "dribbles".  Patient was evaluated by PMD and treated initially with 1 week course of Cipro, then a week later completion followed by 1 week of Augmentin, without any improvement in dysuria and pnuemoturia.  Patient went to PMD for f/u and found to have fever of 102 F, sent in by PMD for IV Abx.  Denies any CP, SOB, cough, abdominal pain, vomiting, diarrhea, constipation, bloody stools. Denies any penile discharge, testicular pain/swelling or recent urological procedure.  Last UTI was "many years ago".  Denies flank pain but c/o low back pain "because I was sitting in the triage for 7 hours".     INTERVAL EVENTS:    REVIEW OF SYSTEMS:  General: denies weight change, fever or fatigue  HEENT: denies sore throat, hoarseness  Respiratory: denies cough, shortness of breath at rest and on exertion, wheezing  Cardiovascular: denies chest pain, abnormal heart rhythm, PND, palpitations  Gastrointestinal: denies nausea, vomiting, diarrhea, bloody or black bowel movements  Genitourinary: denies frequent urination, painful urination, kidney disease  Neurological: denies seizures, headaches  Muscoloskeletal: denies any joint pains  Psychiatric: denies depression, anxiety    PAST MEDICAL & SURGICAL HISTORY:  BPH (benign prostatic hyperplasia)  Peripheral vascular disease  Diabetes mellitus  Hypercholesteremia  Hypertension  S/P cholecystectomy  Fracture of pelvis  S/P femoral-popliteal bypass surgery Left  Cholecystitis  H/O colectomy partial w ileostomy   S/p ileostomy reversal    MEDICATIONS  (STANDING):  aspirin enteric coated 81 milliGRAM(s) Oral daily  atorvastatin 20 milliGRAM(s) Oral at bedtime  carvedilol 6.25 milliGRAM(s) Oral every 12 hours  cilostazol 50 milliGRAM(s) Oral two times a day  dextrose 5%. 1000 milliLiter(s) (50 mL/Hr) IV Continuous <Continuous>  dextrose 50% Injectable 12.5 Gram(s) IV Push once  dextrose 50% Injectable 25 Gram(s) IV Push once  dextrose 50% Injectable 25 Gram(s) IV Push once  heparin   Injectable 5000 Unit(s) SubCutaneous every 8 hours  insulin glargine Injectable (LANTUS) 30 Unit(s) SubCutaneous at bedtime  insulin lispro (ADMELOG) corrective regimen sliding scale   SubCutaneous three times a day before meals  insulin lispro (ADMELOG) corrective regimen sliding scale   SubCutaneous at bedtime  tamsulosin 0.4 milliGRAM(s) Oral at bedtime    MEDICATIONS  (PRN):  dextrose 40% Gel 15 Gram(s) Oral once PRN Blood Glucose LESS THAN 70 milliGRAM(s)/deciliter  glucagon  Injectable 1 milliGRAM(s) IntraMuscular once PRN Glucose LESS THAN 70 milligrams/deciliter    Allergies    vancomycin (Nephrotoxicity)    Intolerances        SOCIAL HISTORY:    FAMILY HISTORY: noncontributory    Objective:   Vital Signs Last 24 Hrs  T(C): 36.4 (30 Oct 2020 06:11), Max: 36.9 (29 Oct 2020 19:54)  T(F): 97.5 (30 Oct 2020 06:11), Max: 98.4 (29 Oct 2020 19:54)  HR: 79 (30 Oct 2020 06:11) (79 - 87)  BP: 155/78 (30 Oct 2020 06:11) (146/78 - 155/78)  BP(mean): --  RR: 18 (30 Oct 2020 06:11) (18 - 18)  SpO2: 98% (30 Oct 2020 06:11) (95% - 98%)  CAPILLARY BLOOD GLUCOSE      POCT Blood Glucose.: 233 mg/dL (30 Oct 2020 12:21)      Physical Exam:  General: NAD, resting comfortably   CV: regular rate and rhythm   Pulm: no increased work of breathing   Abdomen: soft, nontender, nondistended, no rebound or guarding    Extremities: warm and well perfused      LABS:                        9.7    5.24  )-----------( 200      ( 30 Oct 2020 06:38 )             30.3     10-30    140  |  105  |  18  ----------------------------<  127<H>  3.8   |  25  |  1.29    Ca    9.0      30 Oct 2020 06:37  Phos  2.7     10-29  Mg     1.9     10-29      PT/INR - ( 29 Oct 2020 11:42 )   PT: 14.1 sec;   INR: 1.18 ratio         PTT - ( 29 Oct 2020 11:42 )  PTT:26.9 sec              RADIOLOGY & ADDITIONAL STUDIES:     COLORECTAL SURGERY CONSULT NOTE  ZAK DONAHUE  |  [id number]  |  10-30-20 @ 14:11    CC: Patient is a 79y old  Male who presents with a chief complaint of fever and urinary symptoms     HPI: 79M PMH HTN, HLD, IDDM2 sent in by PMD for refractory urinary symptoms and fever.  Patient reported he had been having burning pain upon urination with low volume voids for 1 month associated with slight nausea.  Patient was evaluated by PMD and treated initially with 1 week course of Cipro, then a week later completion followed by 1 week of Augmentin, without any improvement in dysuria and pnuemoturia.  Patient went to PMD for f/u and found to have fever of 102 F, sent in by PMD for IV Abx.  Denies any CP, SOB, cough, abdominal pain, vomiting, diarrhea, constipation, bloody stools. Denies any penile discharge, testicular pain/swelling or recent urological procedure.  Last UTI was "many years ago". Patient has history of diverticulitis episode in past treated with abx as outpatient. Of note, had traumatic partial colectomy in the past with ileostomy, s/p ileostomy reversal at OSH. Per patient, last colonoscopy was 5 years ago and normal. States having normal bowel movements. C/o some nonproductive cough, no fevers.     INTERVAL EVENTS: Patient admitted for management of UTI and started on IV Zosyn. Feels urinary sx improved with IV abx. Underwent US which was concerning for colovesicular fistula. 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 Patient afebrile, hemodynamically stable.     REVIEW OF SYSTEMS:  General: denies weight change, fever or fatigue  HEENT: denies sore throat, hoarseness  Respiratory: denies cough, shortness of breath at rest and on exertion, wheezing  Cardiovascular: denies chest pain, abnormal heart rhythm, PND, palpitations  Gastrointestinal: denies nausea, vomiting, diarrhea, bloody or black bowel movements  Genitourinary: denies painful urination, kidney disease; +air in urine  Neurological: denies seizures, headaches  Muscoloskeletal: denies any joint pains  Psychiatric: denies depression, anxiety    PAST MEDICAL & SURGICAL HISTORY:  BPH (benign prostatic hyperplasia)  Peripheral vascular disease s/p L fem-pop bypass  Diabetes mellitus  Hypercholesteremia  Hypertension  Fracture of pelvis  Cholecystitis S/P cholecystectomy  H/O colectomy partial w ileostomy   S/p ileostomy reversal  lung cancer s/p lobectomy     MEDICATIONS  (STANDING):  aspirin enteric coated 81 milliGRAM(s) Oral daily  atorvastatin 20 milliGRAM(s) Oral at bedtime  carvedilol 6.25 milliGRAM(s) Oral every 12 hours  cilostazol 50 milliGRAM(s) Oral two times a day  dextrose 5%. 1000 milliLiter(s) (50 mL/Hr) IV Continuous <Continuous>  dextrose 50% Injectable 12.5 Gram(s) IV Push once  dextrose 50% Injectable 25 Gram(s) IV Push once  dextrose 50% Injectable 25 Gram(s) IV Push once  heparin   Injectable 5000 Unit(s) SubCutaneous every 8 hours  insulin glargine Injectable (LANTUS) 30 Unit(s) SubCutaneous at bedtime  insulin lispro (ADMELOG) corrective regimen sliding scale   SubCutaneous three times a day before meals  insulin lispro (ADMELOG) corrective regimen sliding scale   SubCutaneous at bedtime  tamsulosin 0.4 milliGRAM(s) Oral at bedtime    MEDICATIONS  (PRN):  dextrose 40% Gel 15 Gram(s) Oral once PRN Blood Glucose LESS THAN 70 milliGRAM(s)/deciliter  glucagon  Injectable 1 milliGRAM(s) IntraMuscular once PRN Glucose LESS THAN 70 milligrams/deciliter    Allergies: vancomycin (Nephrotoxicity)    SOCIAL HISTORY: remote h/o smoking     FAMILY HISTORY: denies FH CRC    Objective:   Vital Signs Last 24 Hrs  T(C): 36.4 (30 Oct 2020 06:11), Max: 36.9 (29 Oct 2020 19:54)  T(F): 97.5 (30 Oct 2020 06:11), Max: 98.4 (29 Oct 2020 19:54)  HR: 79 (30 Oct 2020 06:11) (79 - 87)  BP: 155/78 (30 Oct 2020 06:11) (146/78 - 155/78)  BP(mean): --  RR: 18 (30 Oct 2020 06:11) (18 - 18)  SpO2: 98% (30 Oct 2020 06:11) (95% - 98%)    Physical Exam:  General: NAD, resting comfortably   CV: regular rate and rhythm   Pulm: no increased work of breathing   Abdomen: soft, nontender, nondistended, no rebound or guarding    Extremities: warm and well perfused    LABS:                        9.7    5.24  )-----------( 200      ( 30 Oct 2020 06:38 )             30.3     10-30    140  |  105  |  18  ----------------------------<  127<H>  3.8   |  25  |  1.29    Ca    9.0      30 Oct 2020 06:37  Phos  2.7     10-29  Mg     1.9     10-29    PT/INR - ( 29 Oct 2020 11:42 )   PT: 14.1 sec;   INR: 1.18 ratio   PTT - ( 29 Oct 2020 11:42 )  PTT:26.9 sec      RADIOLOGY & ADDITIONAL STUDIES:    CT Pelvis No Cont (10.29.20 @ 12:38)  Although no extraluminal contrast is demonstrated, there is wall thickening of the posterior dome of the bladder with a small droplet of gas which is in close proximity to the adjacent sigmoid colon.   These findings likely correspond to the fistulous tract demonstrated on ultrasound.     COLORECTAL SURGERY CONSULT NOTE  ZAK DONAHUE  |  [id number]  |  10-30-20 @ 14:11    CC: Patient is a 79y old  Male who presents with a chief complaint of fever and urinary symptoms     HPI: 79M PMH HTN, HLD, IDDM2 sent in by PMD for refractory urinary symptoms and fever.  Patient reported he had been having burning pain upon urination with low volume voids for 1 month associated with slight nausea.  Patient was evaluated by PMD and treated initially with 1 week course of Cipro, then a week later completion followed by 1 week of Augmentin, without any improvement in dysuria and pnuemoturia.  Patient went to PMD for f/u and found to have fever of 102 F, sent in by PMD for IV Abx.  Denies any CP, SOB, cough, abdominal pain, vomiting, diarrhea, constipation, bloody stools. Denies any penile discharge, testicular pain/swelling or recent urological procedure.  Last UTI was "many years ago". Patient has history of diverticulitis episode in past treated with abx as outpatient. Of note, had traumatic right colectomy in the past with ileostomy, s/p ileostomy reversal at OSH. Per patient, last colonoscopy was 5 years ago and normal. States having normal bowel movements. C/o some nonproductive cough, no fevers.     INTERVAL EVENTS: Patient admitted for management of UTI and started on IV Zosyn. Feels urinary sx improved with IV abx. Underwent US which was concerning for colovesicular fistula. 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 Patient afebrile, hemodynamically stable.     REVIEW OF SYSTEMS:  General: denies weight change, fever or fatigue  HEENT: denies sore throat, hoarseness  Respiratory: denies cough, shortness of breath at rest and on exertion, wheezing  Cardiovascular: denies chest pain, abnormal heart rhythm, PND, palpitations  Gastrointestinal: denies nausea, vomiting, diarrhea, bloody or black bowel movements  Genitourinary: denies painful urination, kidney disease; +air in urine  Neurological: denies seizures, headaches  Muscoloskeletal: denies any joint pains  Psychiatric: denies depression, anxiety    PAST MEDICAL & SURGICAL HISTORY:  BPH (benign prostatic hyperplasia)  Peripheral vascular disease s/p L fem-pop bypass  Diabetes mellitus  Hypercholesteremia  Hypertension  Fracture of pelvis  Cholecystitis S/P cholecystectomy  H/O colectomy partial w ileostomy   S/p ileostomy reversal  lung cancer s/p lobectomy     MEDICATIONS  (STANDING):  aspirin enteric coated 81 milliGRAM(s) Oral daily  atorvastatin 20 milliGRAM(s) Oral at bedtime  carvedilol 6.25 milliGRAM(s) Oral every 12 hours  cilostazol 50 milliGRAM(s) Oral two times a day  dextrose 5%. 1000 milliLiter(s) (50 mL/Hr) IV Continuous <Continuous>  dextrose 50% Injectable 12.5 Gram(s) IV Push once  dextrose 50% Injectable 25 Gram(s) IV Push once  dextrose 50% Injectable 25 Gram(s) IV Push once  heparin   Injectable 5000 Unit(s) SubCutaneous every 8 hours  insulin glargine Injectable (LANTUS) 30 Unit(s) SubCutaneous at bedtime  insulin lispro (ADMELOG) corrective regimen sliding scale   SubCutaneous three times a day before meals  insulin lispro (ADMELOG) corrective regimen sliding scale   SubCutaneous at bedtime  tamsulosin 0.4 milliGRAM(s) Oral at bedtime    MEDICATIONS  (PRN):  dextrose 40% Gel 15 Gram(s) Oral once PRN Blood Glucose LESS THAN 70 milliGRAM(s)/deciliter  glucagon  Injectable 1 milliGRAM(s) IntraMuscular once PRN Glucose LESS THAN 70 milligrams/deciliter    Allergies: vancomycin (Nephrotoxicity)    SOCIAL HISTORY: remote h/o smoking     FAMILY HISTORY: denies FH CRC    Objective:   Vital Signs Last 24 Hrs  T(C): 36.4 (30 Oct 2020 06:11), Max: 36.9 (29 Oct 2020 19:54)  T(F): 97.5 (30 Oct 2020 06:11), Max: 98.4 (29 Oct 2020 19:54)  HR: 79 (30 Oct 2020 06:11) (79 - 87)  BP: 155/78 (30 Oct 2020 06:11) (146/78 - 155/78)  BP(mean): --  RR: 18 (30 Oct 2020 06:11) (18 - 18)  SpO2: 98% (30 Oct 2020 06:11) (95% - 98%)    Physical Exam:  General: NAD, resting comfortably   CV: regular rate and rhythm   Pulm: no increased work of breathing   Abdomen: soft, nontender, nondistended, no rebound or guarding    Extremities: warm and well perfused    LABS:                        9.7    5.24  )-----------( 200      ( 30 Oct 2020 06:38 )             30.3     10-30    140  |  105  |  18  ----------------------------<  127<H>  3.8   |  25  |  1.29    Ca    9.0      30 Oct 2020 06:37  Phos  2.7     10-29  Mg     1.9     10-29    PT/INR - ( 29 Oct 2020 11:42 )   PT: 14.1 sec;   INR: 1.18 ratio   PTT - ( 29 Oct 2020 11:42 )  PTT:26.9 sec      RADIOLOGY & ADDITIONAL STUDIES:    CT Pelvis No Cont (10.29.20 @ 12:38)  Although no extraluminal contrast is demonstrated, there is wall thickening of the posterior dome of the bladder with a small droplet of gas which is in close proximity to the adjacent sigmoid colon.   These findings likely correspond to the fistulous tract demonstrated on ultrasound.

## 2020-10-30 NOTE — CONSULT NOTE ADULT - SUBJECTIVE AND OBJECTIVE BOX
Patient is a 79 year old male who presents with a chief complaint of fever and urinary symptoms (30 Oct 2020 14:55)    HPI:  The patient is a 79 year old male with past medical history of hyperlipidemia, BPH, PVD, lung cancer s/p resection, hypertension, and type 2 diabetes mellitus sent in by PMD for refractory urinary symptoms and fever.  Patient reports he has been having burning pain upon urination for 1 month associated with slight nausea and "dribbling".  Patient was evaluated by PMD and treated initially with 1 week course of ciprofloxacin followed by a one week course of Augmentin, without any improvement in dysuria and itching.  Patient went to PMD for follow-up and found to have fever of 102 fever and was subsequently sent in by PMD for intravenous antibiotics. He denies any chest pain, SOB, dizziness, palpitations, cough, abdominal pain, vomiting, diarrhea, constipation, or bloody stools. He denies any penile discharge, testicular pain/swelling or recent urological procedure.  His last UTI was "many years ago." He was evaluated by urology who recommended to start empiric antibiotics and to obtain abdominal imaging. He was also evaluated by pulmonology for cough in the setting of empyema vs. pleural effusions vs. post-surgical changes. CBC showed neutrophilic leukocytosis on admission which normalized. Normocytic anemia was also noted. CMP showed mild hyperglycemia. Hypoalbuminemia and hypoproteinemia were noted along with elevated alkaline phosphatase. Urine cultures were positive for ESBL Klebsiella pnuemoniae and E. coli. Blood cultures were NGTD. Hypophosphatemia was also noted. Urinalysis was positive for UTI and respiratory viral panel was negative. Infectious disease was consulted for sepsis due to urinary tract infection. CT chest without contrast showed a small loculated heterogeneous exudative right pleural effusion with right pleural thickening. CT abdomen and pelvis without contrast showed wall thickening of the posterior dome of the bladder with a small droplet of gas which is in close proximity to the adjacent sigmoid colon. These findings likely correspond to the fistulous tract demonstrated on ultrasound. Kidney ultrasound showed a colovesicular fistula. CXR showed a right sided pleural effusion. He was started on intravenous piperacillin-tazobactam. COVID-19 Ab is pending.       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   200 mL/Hr IV Intermittent (10-27-20 @ 08:18)      OTHER MEDS: MEDICATIONS  (STANDING):  ALBUTerol    0.083% 2.5 every 6 hours  ALBUTerol    90 MICROgram(s) HFA Inhaler 1 every 4 hours  aspirin enteric coated 81 daily  atorvastatin 20 at bedtime  carvedilol 6.25 every 12 hours  cilostazol 50 two times a day  dextrose 40% Gel 15 once PRN  dextrose 50% Injectable 12.5 once  dextrose 50% Injectable 25 once  dextrose 50% Injectable 25 once  glucagon  Injectable 1 once PRN  heparin   Injectable 5000 every 8 hours  insulin glargine Injectable (LANTUS) 20 at bedtime  insulin lispro (ADMELOG) corrective regimen sliding scale  three times a day before meals  insulin lispro (ADMELOG) corrective regimen sliding scale  at bedtime  insulin lispro Injectable (ADMELOG) 5 three times a day before meals  losartan 12.5 daily  tamsulosin 0.4 at bedtime    SOCIAL HISTORY:       FAMILY HISTORY:  FH: diabetes mellitus  brother    FH: Alzheimers disease  mother     FH: pancreatic cancer  father       REVIEW OF SYSTEMS  [  ] ROS unobtainable because:    [x] All other systems negative except as noted below:	    Constitutional:  [x] fever [ ] chills  [ ] weight loss  [ ] weakness  Skin:  [ ] rash [ ] phlebitis	  Eyes: [ ] icterus [ ] pain  [ ] discharge	  ENMT: [ ] sore throat  [ ] thrush [ ] ulcers [ ] exudates  Respiratory: [ ] dyspnea [ ] hemoptysis [ ] cough [ ] sputum	  Cardiovascular:  [ ] chest pain [ ] palpitations [ ] edema	  Gastrointestinal:  [ ] nausea [ ] vomiting [ ] diarrhea [ ] constipation [x] pain	  Genitourinary:  [x] dysuria [ ] frequency [ ] hematuria [ ] discharge [ ] flank pain  [ ] incontinence  Musculoskeletal:  [ ] myalgias [ ] arthralgias [ ] arthritis  [ ] back pain  Neurological:  [ ] headache [ ] seizures  [ ] confusion/altered mental status  Psychiatric:  [ ] anxiety [ ] depression	  Hematology/Lymphatics:  [ ] lymphadenopathy  Endocrine:  [ ] adrenal [ ] thyroid  Allergic/Immunologic:	 [ ] transplant [ ] seasonal    Vital Signs Last 24 Hrs  T(F): 98.2 (10-30-20 @ 14:56), Max: 101.7 (10-26-20 @ 22:45)  Vital Signs Last 24 Hrs  HR: 74 (10-30-20 @ 14:56) (74 - 87)  BP: 155/78 (10-30-20 @ 06:11) (146/78 - 155/78)  RR: 18 (10-30-20 @ 14:56)  SpO2: 98% (10-30-20 @ 14:56) (95% - 98%)  Wt(kg): --    PHYSICAL EXAM:  Constitutional: non-toxic, no distress  HEAD/EYES: anicteric, no conjunctival injection  ENT:  supple, no thrush  Cardiovascular:   normal S1, S2, no murmur, no edema  Respiratory:  clear BS bilaterally, no wheezes, no rales  GI:  soft, non-tender, normal bowel sounds  :  no kaye, no CVA tenderness  Musculoskeletal:  no synovitis, normal ROM  Neurologic: awake and alert, normal strength, no focal findings  Skin:  no rash, no erythema, no phlebitis  Heme/Onc: no lymphadenopathy   Psychiatric:  awake, alert, appropriate mood                            9.7    5.24  )-----------( 200      ( 30 Oct 2020 06:38 )             30.3   10-30    140  |  105  |  18  ----------------------------<  127<H>  3.8   |  25  |  1.29    Ca    9.0      30 Oct 2020 06:37  Phos  2.7     10-29  Mg     1.9     10-29      MICROBIOLOGY:  Culture - Urine (collected 27 Oct 2020 02:36)  Source: .Urine Clean Catch (Midstream)  Final Report (30 Oct 2020 08:27):    >100,000 CFU/ml Klebsiella pneumoniae ESBL    >100,000 CFU/ml Escherichia coli  Organism: Klebsiella pneumoniae ESBL  Escherichia coli (30 Oct 2020 08:27)  Organism: Escherichia coli (30 Oct 2020 08:27)      -  Amikacin: S <=16      -  Amoxicillin/Clavulanic Acid: S <=8/4      -  Ampicillin: R >16 These ampicillin results predict results for amoxicillin      -  Ampicillin/Sulbactam: I 16/8 Enterobacter, Citrobacter, and Serratia may develop resistance during prolonged therapy (3-4 days)      -  Aztreonam: S <=4      -  Cefazolin: S 8 (MIC_CL_COM_ENTERIC_CEFAZU) For uncomplicated UTI with K. pneumoniae, E. coli, or P. mirablis: THANH <=16 is sensitive and THANH >=32 is resistant. This also predicts results for oral agents cefaclor, cefdinir, cefpodoxime, cefprozil, cefuroxime axetil, cephalexin and locarbef for uncomplicated UTI. Note that some isolates may be susceptible to these agents while testing resistant to cefazolin.      -  Cefepime: S <=2      -  Cefoxitin: S <=8      -  Ceftriaxone: S <=1 Enterobacter, Citrobacter, and Serratia may develop resistance during prolonged therapy      -  Ciprofloxacin: R >2      -  Ertapenem: S <=0.5      -  Gentamicin: S <=2      -  Imipenem: S <=1      -  Levofloxacin: R >4      -  Meropenem: S <=1      -  Nitrofurantoin: S <=32 Should not be used to treat pyelonephritis      -  Piperacillin/Tazobactam: S <=8      -  Tigecycline: S <=2      -  Tobramycin: S <=2      -  Trimethoprim/Sulfamethoxazole: R >2/38      Method Type: THANH  Organism: Klebsiella pneumoniae ESBL (30 Oct 2020 08:27)      -  Amikacin: S <=16      -  Amoxicillin/Clavulanic Acid: R >16/8      -  Ampicillin: R >16 These ampicillin results predict results for amoxicillin      -  Ampicillin/Sulbactam: R >16/8 Enterobacter, Citrobacter, and Serratia may develop resistance during prolonged therapy (3-4 days)      -  Aztreonam: R 16      -  Cefazolin: R >16 (MIC_CL_COM_ENTERIC_CEFAZU) For uncomplicated UTI with K. pneumoniae, E. coli, or P. mirablis: THANH <=16 is sensitive and THANH >=32 is resistant. This also predicts results for oral agents cefaclor, cefdinir, cefpodoxime, cefprozil, cefuroxime axetil, cephalexin and locarbef for uncomplicated UTI. Note that some isolates may be susceptible to these agents while testing resistant to cefazolin.      -  Cefepime: R >16      -  Cefoxitin: S <=8      -  Ceftriaxone: R >32 Enterobacter, Citrobacter, and Serratia may develop resistance during prolonged therapy      -  Ciprofloxacin: R 2      -  Ertapenem: S <=0.5      -  Gentamicin: R >8      -  Imipenem: S <=1      -  Levofloxacin: I 1      -  Meropenem: S <=1      -  Nitrofurantoin: I 64 Should not be used to treat pyelonephritis      -  Piperacillin/Tazobactam: S <=8      -  Tigecycline: S <=2      -  Tobramycin: R >8      -  Trimethoprim/Sulfamethoxazole: R >2/38      Method Type: THANH    Culture - Blood (collected 27 Oct 2020 01:34)  Source: .Blood Blood-Peripheral  Preliminary Report (28 Oct 2020 02:02):    No growth to date.    Culture - Blood (collected 27 Oct 2020 01:32)  Source: .Blood Blood-Peripheral  Preliminary Report (28 Oct 2020 02:02):    No growth to date.        Rapid RVP Result: NotDetec (10-27 @ 00:18)      RADIOLOGY:    CT Chest No Cont (10.29.20 @ 12:42)  Comparison is made with a prior chest CT of 2012.    No enlarged axillary lymph nodes. Prominent right azygoesophageal recess lymph node measuring about 1.2 cm. Other nonspecific subcentimeter mediastinal lymph nodes are also noted.    Small pericardial effusion. Heart size is within normal limits. Atherosclerotic disease with involvement of the aorta and the coronary arteries. The right cardiophrenic angle lymph node measuring up to1.2 cm appears unchanged. Left chest wall cardiac loop recorder.    Evaluation of the upper abdomen demonstrate cholecystectomy clips. Partially imaged left renal cyst.    Small heterogeneous right pleural effusion with areas of loculation, demonstrating some internal heterogeneity. Right pleural thickening, most notable within the right mid to lower hemithorax. Please note evaluation of the pleural surface is limited due to lack of intravenous contrast.    Trace left pleural effusion.    Evaluation of the lung parenchyma demonstrate 4 mm groundglass nodule within the superior segment of the left lower lobe on image 63 new since the prior study.  6 mm left lower lobe pulmonary nodule on image 66 is predominantly unchanged.  2 pulmonary nodules at the left apex measuring up to 4 mm are predominantly unchanged.  Status post left lower lobe wedge resection with stable postoperative changes.    Status post right upper lobe wedge resection with new associated postoperative changes since 2012. Ill-defined small patchy opacity at the right apex measuring about 1.8 cm in anteroposterior dimension.    Secretions within the right mainstem bronchus.    Degenerative changes of the spine. Lucent lesion with internal ossific particles involving the right humeral head is unchanged since the chest CT of 2010.    IMPRESSION: Small loculated heterogeneous exudative right pleural effusion with right pleural thickening. Please note evaluation of the pleural surface is limiteddue to lack of intravenous contrast. Contrast-enhanced chest CT can be performed for complete evaluation given the history of lung cancer.    Status post right upper lobectomy with postoperative changes.    Small patchy right apical opacity is nonspecific. Differential diagnosis include but is not limited to infection or postoperative change. Comparison with more recent postoperative prior studies would be helpful for further evaluation and if images are made available, an addendum can be issued.Alternatively a 3 month follow-up noncontrast chest CT can be performed for further evaluation.        CT Pelvis No Cont (10.29.20 @ 12:38)    IMPRESSION:  Although no extraluminal contrast is demonstrated, there is wall thickening of the posterior dome of the bladder with a small droplet of gas which is in close proximity to the adjacent sigmoid colon.   These findings likely correspond to the fistulous tract demonstrated on ultrasound..        Xray Chest 1 View- PORTABLE-Urgent (Xray Chest 1 View- PORTABLE-Urgent .) (10.28.20 @ 13:22)    Impression:    The heart is normal in size. Right pleural effusion which remain unchanged when compared to previous study done 2020. A loop recorder is seen overlying the left hemithorax. No pneumothorax. No acute bony pathology could be identified.    US Kidney and Bladder (10.27.20 @ 14:20)  FINDINGS:    Right kidney: 10.8. No renal mass, hydronephrosis or calculi.    Left kidney: 11.9. No renal mass, hydronephrosis or calculi. Multiple cortical cysts, the largest at the lower pole measuring 10.0 x 9.7 cm.    Urinary bladder: Moderately distended. Air within bladder lumen. Mural thickening at bladder dome. Echogenic track transgressing the bladder dome and extending to abnormally thickened loop of sigmoid colon. Air bubbles seen at real-time examination passing through the tract and entering the bladder lumen.    Incidental note made of complex right pleural effusion.    IMPRESSION:    Colovesicular fistula.       Patient is a 79 year old male who presents with a chief complaint of fever and urinary symptoms (30 Oct 2020 14:55)    HPI:  The patient is a 79 year old male with past medical history of hyperlipidemia, BPH, PVD, lung cancer s/p resection, hypertension, and type 2 diabetes mellitus sent in by PMD for refractory urinary symptoms and fever.  Patient reports he has been having burning pain upon urination for 1 month associated with slight nausea and "dribbling".  Patient was evaluated by PMD and treated initially with 1 week course of ciprofloxacin followed by a one week course of Augmentin, without any improvement in dysuria and itching.  Patient went to PMD for follow-up and found to have fever of 102 fever and was subsequently sent in by PMD for intravenous antibiotics. He denies any chest pain, SOB, dizziness, palpitations, cough, abdominal pain, vomiting, diarrhea, constipation, or bloody stools. He denies any penile discharge, testicular pain/swelling or recent urological procedure.  His last UTI was "many years ago." He was evaluated by urology who recommended to start empiric antibiotics and to obtain abdominal imaging. He was also evaluated by pulmonology for cough in the setting of empyema vs. pleural effusions vs. post-surgical changes. CBC showed neutrophilic leukocytosis on admission which normalized. Normocytic anemia was also noted. CMP showed mild hyperglycemia. Hypoalbuminemia and hypoproteinemia were noted along with elevated alkaline phosphatase. Urine cultures were positive for ESBL Klebsiella pnuemoniae and E. coli. Blood cultures were NGTD. Hypophosphatemia was also noted. Urinalysis was positive for UTI and respiratory viral panel was negative. Infectious disease was consulted for sepsis due to urinary tract infection. CT chest without contrast showed a small loculated heterogeneous exudative right pleural effusion with right pleural thickening. CT abdomen and pelvis without contrast showed wall thickening of the posterior dome of the bladder with a small droplet of gas which is in close proximity to the adjacent sigmoid colon. These findings likely correspond to the fistulous tract demonstrated on ultrasound. Kidney ultrasound showed a colovesicular fistula. CXR showed a right sided pleural effusion. He was started on intravenous piperacillin-tazobactam. COVID-19 Ab is pending. Surgery evaluated the patient and recommended no acute surgical intervention.       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   200 mL/Hr IV Intermittent (10-27-20 @ 08:18)      OTHER MEDS: MEDICATIONS  (STANDING):  ALBUTerol    0.083% 2.5 every 6 hours  ALBUTerol    90 MICROgram(s) HFA Inhaler 1 every 4 hours  aspirin enteric coated 81 daily  atorvastatin 20 at bedtime  carvedilol 6.25 every 12 hours  cilostazol 50 two times a day  dextrose 40% Gel 15 once PRN  dextrose 50% Injectable 12.5 once  dextrose 50% Injectable 25 once  dextrose 50% Injectable 25 once  glucagon  Injectable 1 once PRN  heparin   Injectable 5000 every 8 hours  insulin glargine Injectable (LANTUS) 20 at bedtime  insulin lispro (ADMELOG) corrective regimen sliding scale  three times a day before meals  insulin lispro (ADMELOG) corrective regimen sliding scale  at bedtime  insulin lispro Injectable (ADMELOG) 5 three times a day before meals  losartan 12.5 daily  tamsulosin 0.4 at bedtime    SOCIAL HISTORY:       FAMILY HISTORY:  FH: diabetes mellitus  brother    FH: Alzheimers disease  mother     FH: pancreatic cancer  father       REVIEW OF SYSTEMS  [  ] ROS unobtainable because:    [x] All other systems negative except as noted below:	    Constitutional:  [x] fever [ ] chills  [ ] weight loss  [ ] weakness  Skin:  [ ] rash [ ] phlebitis	  Eyes: [ ] icterus [ ] pain  [ ] discharge	  ENMT: [ ] sore throat  [ ] thrush [ ] ulcers [ ] exudates  Respiratory: [ ] dyspnea [ ] hemoptysis [ ] cough [ ] sputum	  Cardiovascular:  [ ] chest pain [ ] palpitations [ ] edema	  Gastrointestinal:  [ ] nausea [ ] vomiting [ ] diarrhea [ ] constipation [x] pain	  Genitourinary:  [x] dysuria [ ] frequency [ ] hematuria [ ] discharge [ ] flank pain  [ ] incontinence  Musculoskeletal:  [ ] myalgias [ ] arthralgias [ ] arthritis  [ ] back pain  Neurological:  [ ] headache [ ] seizures  [ ] confusion/altered mental status  Psychiatric:  [ ] anxiety [ ] depression	  Hematology/Lymphatics:  [ ] lymphadenopathy  Endocrine:  [ ] adrenal [ ] thyroid  Allergic/Immunologic:	 [ ] transplant [ ] seasonal    Vital Signs Last 24 Hrs  T(F): 98.2 (10-30-20 @ 14:56), Max: 101.7 (10-26-20 @ 22:45)  Vital Signs Last 24 Hrs  HR: 74 (10-30-20 @ 14:56) (74 - 87)  BP: 155/78 (10-30-20 @ 06:11) (146/78 - 155/78)  RR: 18 (10-30-20 @ 14:56)  SpO2: 98% (10-30-20 @ 14:56) (95% - 98%)  Wt(kg): --    PHYSICAL EXAM:  Constitutional: non-toxic, no distress  HEAD/EYES: anicteric, no conjunctival injection  ENT:  supple, no thrush  Cardiovascular:   normal S1, S2, no murmur, no edema  Respiratory:  clear BS bilaterally, no wheezes, no rales  GI:  soft, non-tender, normal bowel sounds  :  no kaye, no CVA tenderness  Musculoskeletal:  no synovitis, normal ROM  Neurologic: awake and alert, normal strength, no focal findings  Skin:  no rash, no erythema, no phlebitis  Heme/Onc: no lymphadenopathy   Psychiatric:  awake, alert, appropriate mood                            9.7    5.24  )-----------( 200      ( 30 Oct 2020 06:38 )             30.3   10-30    140  |  105  |  18  ----------------------------<  127<H>  3.8   |  25  |  1.29    Ca    9.0      30 Oct 2020 06:37  Phos  2.7     10-29  Mg     1.9     10-29      MICROBIOLOGY:  Culture - Urine (collected 27 Oct 2020 02:36)  Source: .Urine Clean Catch (Midstream)  Final Report (30 Oct 2020 08:27):    >100,000 CFU/ml Klebsiella pneumoniae ESBL    >100,000 CFU/ml Escherichia coli  Organism: Klebsiella pneumoniae ESBL  Escherichia coli (30 Oct 2020 08:27)  Organism: Escherichia coli (30 Oct 2020 08:27)      -  Amikacin: S <=16      -  Amoxicillin/Clavulanic Acid: S <=8/4      -  Ampicillin: R >16 These ampicillin results predict results for amoxicillin      -  Ampicillin/Sulbactam: I 16/8 Enterobacter, Citrobacter, and Serratia may develop resistance during prolonged therapy (3-4 days)      -  Aztreonam: S <=4      -  Cefazolin: S 8 (MIC_CL_COM_ENTERIC_CEFAZU) For uncomplicated UTI with K. pneumoniae, E. coli, or P. mirablis: THANH <=16 is sensitive and THANH >=32 is resistant. This also predicts results for oral agents cefaclor, cefdinir, cefpodoxime, cefprozil, cefuroxime axetil, cephalexin and locarbef for uncomplicated UTI. Note that some isolates may be susceptible to these agents while testing resistant to cefazolin.      -  Cefepime: S <=2      -  Cefoxitin: S <=8      -  Ceftriaxone: S <=1 Enterobacter, Citrobacter, and Serratia may develop resistance during prolonged therapy      -  Ciprofloxacin: R >2      -  Ertapenem: S <=0.5      -  Gentamicin: S <=2      -  Imipenem: S <=1      -  Levofloxacin: R >4      -  Meropenem: S <=1      -  Nitrofurantoin: S <=32 Should not be used to treat pyelonephritis      -  Piperacillin/Tazobactam: S <=8      -  Tigecycline: S <=2      -  Tobramycin: S <=2      -  Trimethoprim/Sulfamethoxazole: R >2/38      Method Type: THANH  Organism: Klebsiella pneumoniae ESBL (30 Oct 2020 08:27)      -  Amikacin: S <=16      -  Amoxicillin/Clavulanic Acid: R >16/8      -  Ampicillin: R >16 These ampicillin results predict results for amoxicillin      -  Ampicillin/Sulbactam: R >16/8 Enterobacter, Citrobacter, and Serratia may develop resistance during prolonged therapy (3-4 days)      -  Aztreonam: R 16      -  Cefazolin: R >16 (MIC_CL_COM_ENTERIC_CEFAZU) For uncomplicated UTI with K. pneumoniae, E. coli, or P. mirablis: THANH <=16 is sensitive and THANH >=32 is resistant. This also predicts results for oral agents cefaclor, cefdinir, cefpodoxime, cefprozil, cefuroxime axetil, cephalexin and locarbef for uncomplicated UTI. Note that some isolates may be susceptible to these agents while testing resistant to cefazolin.      -  Cefepime: R >16      -  Cefoxitin: S <=8      -  Ceftriaxone: R >32 Enterobacter, Citrobacter, and Serratia may develop resistance during prolonged therapy      -  Ciprofloxacin: R 2      -  Ertapenem: S <=0.5      -  Gentamicin: R >8      -  Imipenem: S <=1      -  Levofloxacin: I 1      -  Meropenem: S <=1      -  Nitrofurantoin: I 64 Should not be used to treat pyelonephritis      -  Piperacillin/Tazobactam: S <=8      -  Tigecycline: S <=2      -  Tobramycin: R >8      -  Trimethoprim/Sulfamethoxazole: R >2/38      Method Type: THANH    Culture - Blood (collected 27 Oct 2020 01:34)  Source: .Blood Blood-Peripheral  Preliminary Report (28 Oct 2020 02:02):    No growth to date.    Culture - Blood (collected 27 Oct 2020 01:32)  Source: .Blood Blood-Peripheral  Preliminary Report (28 Oct 2020 02:02):    No growth to date.        Rapid RVP Result: NotDetec (10-27 @ 00:18)      RADIOLOGY:    CT Chest No Cont (10.29.20 @ 12:42)  Comparison is made with a prior chest CT of 2012.    No enlarged axillary lymph nodes. Prominent right azygoesophageal recess lymph node measuring about 1.2 cm. Other nonspecific subcentimeter mediastinal lymph nodes are also noted.    Small pericardial effusion. Heart size is within normal limits. Atherosclerotic disease with involvement of the aorta and the coronary arteries. The right cardiophrenic angle lymph node measuring up to1.2 cm appears unchanged. Left chest wall cardiac loop recorder.    Evaluation of the upper abdomen demonstrate cholecystectomy clips. Partially imaged left renal cyst.    Small heterogeneous right pleural effusion with areas of loculation, demonstrating some internal heterogeneity. Right pleural thickening, most notable within the right mid to lower hemithorax. Please note evaluation of the pleural surface is limited due to lack of intravenous contrast.    Trace left pleural effusion.    Evaluation of the lung parenchyma demonstrate 4 mm groundglass nodule within the superior segment of the left lower lobe on image 63 new since the prior study.  6 mm left lower lobe pulmonary nodule on image 66 is predominantly unchanged.  2 pulmonary nodules at the left apex measuring up to 4 mm are predominantly unchanged.  Status post left lower lobe wedge resection with stable postoperative changes.    Status post right upper lobe wedge resection with new associated postoperative changes since 2012. Ill-defined small patchy opacity at the right apex measuring about 1.8 cm in anteroposterior dimension.    Secretions within the right mainstem bronchus.    Degenerative changes of the spine. Lucent lesion with internal ossific particles involving the right humeral head is unchanged since the chest CT of 2010.    IMPRESSION: Small loculated heterogeneous exudative right pleural effusion with right pleural thickening. Please note evaluation of the pleural surface is limiteddue to lack of intravenous contrast. Contrast-enhanced chest CT can be performed for complete evaluation given the history of lung cancer.    Status post right upper lobectomy with postoperative changes.    Small patchy right apical opacity is nonspecific. Differential diagnosis include but is not limited to infection or postoperative change. Comparison with more recent postoperative prior studies would be helpful for further evaluation and if images are made available, an addendum can be issued.Alternatively a 3 month follow-up noncontrast chest CT can be performed for further evaluation.        CT Pelvis No Cont (10.29.20 @ 12:38)    IMPRESSION:  Although no extraluminal contrast is demonstrated, there is wall thickening of the posterior dome of the bladder with a small droplet of gas which is in close proximity to the adjacent sigmoid colon.   These findings likely correspond to the fistulous tract demonstrated on ultrasound..        Xray Chest 1 View- PORTABLE-Urgent (Xray Chest 1 View- PORTABLE-Urgent .) (10.28.20 @ 13:22)    Impression:    The heart is normal in size. Right pleural effusion which remain unchanged when compared to previous study done 2020. A loop recorder is seen overlying the left hemithorax. No pneumothorax. No acute bony pathology could be identified.    US Kidney and Bladder (10.27.20 @ 14:20)  FINDINGS:    Right kidney: 10.8. No renal mass, hydronephrosis or calculi.    Left kidney: 11.9. No renal mass, hydronephrosis or calculi. Multiple cortical cysts, the largest at the lower pole measuring 10.0 x 9.7 cm.    Urinary bladder: Moderately distended. Air within bladder lumen. Mural thickening at bladder dome. Echogenic track transgressing the bladder dome and extending to abnormally thickened loop of sigmoid colon. Air bubbles seen at real-time examination passing through the tract and entering the bladder lumen.    Incidental note made of complex right pleural effusion.    IMPRESSION:    Colovesicular fistula.       Patient is a 79 year old male who presents with a chief complaint of fever and urinary symptoms (30 Oct 2020 14:55)    HPI:  The patient is a 79 year old male with past medical history of hyperlipidemia, BPH, PVD, lung cancer s/p resection, hypertension, and type 2 diabetes mellitus sent in by PMD for refractory urinary symptoms and fever.  Patient reports he has been having burning pain upon urination for 1 month associated with slight nausea and "dribbling".  Patient was evaluated by PMD and treated initially with 1 week course of ciprofloxacin followed by a one week course of Augmentin, without any improvement in dysuria and itching.  Patient went to PMD for follow-up and found to have fever of 102 fever and was subsequently sent in by PMD for intravenous antibiotics. He denies any chest pain, SOB, dizziness, palpitations, cough, abdominal pain, vomiting, diarrhea, constipation, or bloody stools. He denies any penile discharge, testicular pain/swelling or recent urological procedure.  His last UTI was "many years ago." He was evaluated by urology who recommended to start empiric antibiotics and to obtain abdominal imaging. He was also evaluated by pulmonology for cough in the setting of empyema vs. pleural effusions vs. post-surgical changes. CBC showed neutrophilic leukocytosis on admission which normalized. Normocytic anemia was also noted. CMP showed mild hyperglycemia. Hypoalbuminemia and hypoproteinemia were noted along with elevated alkaline phosphatase. Urine cultures were positive for ESBL Klebsiella pnuemoniae and E. coli. Blood cultures were NGTD. Hypophosphatemia was also noted. Urinalysis was positive for UTI and respiratory viral panel was negative. Infectious disease was consulted for sepsis due to urinary tract infection. CT chest without contrast showed a small loculated heterogeneous exudative right pleural effusion with right pleural thickening. CT abdomen and pelvis without contrast showed wall thickening of the posterior dome of the bladder with a small droplet of gas which is in close proximity to the adjacent sigmoid colon. These findings likely correspond to the fistulous tract demonstrated on ultrasound. Kidney ultrasound showed a colovesicular fistula. CXR showed a right sided pleural effusion. He was started on intravenous piperacillin-tazobactam. COVID-19 Ab is pending. Surgery evaluated the patient and recommended no acute surgical intervention.       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   200 mL/Hr IV Intermittent (10-27-20 @ 08:18)      OTHER MEDS: MEDICATIONS  (STANDING):  ALBUTerol    0.083% 2.5 every 6 hours  ALBUTerol    90 MICROgram(s) HFA Inhaler 1 every 4 hours  aspirin enteric coated 81 daily  atorvastatin 20 at bedtime  carvedilol 6.25 every 12 hours  cilostazol 50 two times a day  dextrose 40% Gel 15 once PRN  dextrose 50% Injectable 12.5 once  dextrose 50% Injectable 25 once  dextrose 50% Injectable 25 once  glucagon  Injectable 1 once PRN  heparin   Injectable 5000 every 8 hours  insulin glargine Injectable (LANTUS) 20 at bedtime  insulin lispro (ADMELOG) corrective regimen sliding scale  three times a day before meals  insulin lispro (ADMELOG) corrective regimen sliding scale  at bedtime  insulin lispro Injectable (ADMELOG) 5 three times a day before meals  losartan 12.5 daily  tamsulosin 0.4 at bedtime    SOCIAL HISTORY:       former cigs     FAMILY HISTORY:  FH: diabetes mellitus  brother    FH: Alzheimers disease  mother     FH: pancreatic cancer  father       REVIEW OF SYSTEMS  [  ] ROS unobtainable because:    [x] All other systems negative except as noted below:	    Constitutional:  [x] fever [ ] chills  [ ] weight loss  [ ] weakness  Skin:  [ ] rash [ ] phlebitis	  Eyes: [ ] icterus [ ] pain  [ ] discharge	  ENMT: [ ] sore throat  [ ] thrush [ ] ulcers [ ] exudates  Respiratory: [ ] dyspnea [ ] hemoptysis [ ] cough [ ] sputum	  Cardiovascular:  [ ] chest pain [ ] palpitations [ ] edema	  Gastrointestinal:  [ ] nausea [ ] vomiting [ ] diarrhea [ ] constipation [x] pain	  Genitourinary:  [x] dysuria [ ] frequency [ ] hematuria [ ] discharge [ ] flank pain  [ ] incontinence  Musculoskeletal:  [ ] myalgias [ ] arthralgias [ ] arthritis  [ ] back pain  Neurological:  [ ] headache [ ] seizures  [ ] confusion/altered mental status  Psychiatric:  [ ] anxiety [ ] depression	  Hematology/Lymphatics:  [ ] lymphadenopathy  Endocrine:  [ ] adrenal [ ] thyroid  Allergic/Immunologic:	 [ ] transplant [ ] seasonal    Vital Signs Last 24 Hrs  T(F): 98.2 (10-30-20 @ 14:56), Max: 101.7 (10-26-20 @ 22:45)  Vital Signs Last 24 Hrs  HR: 74 (10-30-20 @ 14:56) (74 - 87)  BP: 155/78 (10-30-20 @ 06:11) (146/78 - 155/78)  RR: 18 (10-30-20 @ 14:56)  SpO2: 98% (10-30-20 @ 14:56) (95% - 98%)  Wt(kg): --    PHYSICAL EXAM:  Constitutional: non-toxic, no distress  HEAD/EYES: anicteric, no conjunctival injection  ENT:  supple, no thrush  Cardiovascular:   normal S1, S2, no murmur, no edema  Respiratory:  clear BS bilaterally, no wheezes, no rales  GI:  soft, non-tender, normal bowel sounds  :  no kaye, no CVA tenderness  Musculoskeletal:  no synovitis, normal ROM  Neurologic: awake and alert, normal strength, no focal findings  Skin:  no rash, no erythema, no phlebitis  Heme/Onc: no lymphadenopathy   Psychiatric:  awake, alert, appropriate mood                            9.7    5.24  )-----------( 200      ( 30 Oct 2020 06:38 )             30.3   10-30    140  |  105  |  18  ----------------------------<  127<H>  3.8   |  25  |  1.29    Ca    9.0      30 Oct 2020 06:37  Phos  2.7     10-29  Mg     1.9     10-29      MICROBIOLOGY:  Culture - Urine (collected 27 Oct 2020 02:36)  Source: .Urine Clean Catch (Midstream)  Final Report (30 Oct 2020 08:27):    >100,000 CFU/ml Klebsiella pneumoniae ESBL    >100,000 CFU/ml Escherichia coli  Organism: Klebsiella pneumoniae ESBL  Escherichia coli (30 Oct 2020 08:27)  Organism: Escherichia coli (30 Oct 2020 08:27)      -  Amikacin: S <=16      -  Amoxicillin/Clavulanic Acid: S <=8/4      -  Ampicillin: R >16 These ampicillin results predict results for amoxicillin      -  Ampicillin/Sulbactam: I 16/8 Enterobacter, Citrobacter, and Serratia may develop resistance during prolonged therapy (3-4 days)      -  Aztreonam: S <=4      -  Cefazolin: S 8 (MIC_CL_COM_ENTERIC_CEFAZU) For uncomplicated UTI with K. pneumoniae, E. coli, or P. mirablis: THANH <=16 is sensitive and THANH >=32 is resistant. This also predicts results for oral agents cefaclor, cefdinir, cefpodoxime, cefprozil, cefuroxime axetil, cephalexin and locarbef for uncomplicated UTI. Note that some isolates may be susceptible to these agents while testing resistant to cefazolin.      -  Cefepime: S <=2      -  Cefoxitin: S <=8      -  Ceftriaxone: S <=1 Enterobacter, Citrobacter, and Serratia may develop resistance during prolonged therapy      -  Ciprofloxacin: R >2      -  Ertapenem: S <=0.5      -  Gentamicin: S <=2      -  Imipenem: S <=1      -  Levofloxacin: R >4      -  Meropenem: S <=1      -  Nitrofurantoin: S <=32 Should not be used to treat pyelonephritis      -  Piperacillin/Tazobactam: S <=8      -  Tigecycline: S <=2      -  Tobramycin: S <=2      -  Trimethoprim/Sulfamethoxazole: R >2/38      Method Type: THANH  Organism: Klebsiella pneumoniae ESBL (30 Oct 2020 08:27)      -  Amikacin: S <=16      -  Amoxicillin/Clavulanic Acid: R >16/8      -  Ampicillin: R >16 These ampicillin results predict results for amoxicillin      -  Ampicillin/Sulbactam: R >16/8 Enterobacter, Citrobacter, and Serratia may develop resistance during prolonged therapy (3-4 days)      -  Aztreonam: R 16      -  Cefazolin: R >16 (MIC_CL_COM_ENTERIC_CEFAZU) For uncomplicated UTI with K. pneumoniae, E. coli, or P. mirablis: THANH <=16 is sensitive and THANH >=32 is resistant. This also predicts results for oral agents cefaclor, cefdinir, cefpodoxime, cefprozil, cefuroxime axetil, cephalexin and locarbef for uncomplicated UTI. Note that some isolates may be susceptible to these agents while testing resistant to cefazolin.      -  Cefepime: R >16      -  Cefoxitin: S <=8      -  Ceftriaxone: R >32 Enterobacter, Citrobacter, and Serratia may develop resistance during prolonged therapy      -  Ciprofloxacin: R 2      -  Ertapenem: S <=0.5      -  Gentamicin: R >8      -  Imipenem: S <=1      -  Levofloxacin: I 1      -  Meropenem: S <=1      -  Nitrofurantoin: I 64 Should not be used to treat pyelonephritis      -  Piperacillin/Tazobactam: S <=8      -  Tigecycline: S <=2      -  Tobramycin: R >8      -  Trimethoprim/Sulfamethoxazole: R >2/38      Method Type: THANH    Culture - Blood (collected 27 Oct 2020 01:34)  Source: .Blood Blood-Peripheral  Preliminary Report (28 Oct 2020 02:02):    No growth to date.    Culture - Blood (collected 27 Oct 2020 01:32)  Source: .Blood Blood-Peripheral  Preliminary Report (28 Oct 2020 02:02):    No growth to date.        Rapid RVP Result: NotDetec (10-27 @ 00:18)      RADIOLOGY:    CT Chest No Cont (10.29.20 @ 12:42)  Comparison is made with a prior chest CT of 2012.    No enlarged axillary lymph nodes. Prominent right azygoesophageal recess lymph node measuring about 1.2 cm. Other nonspecific subcentimeter mediastinal lymph nodes are also noted.    Small pericardial effusion. Heart size is within normal limits. Atherosclerotic disease with involvement of the aorta and the coronary arteries. The right cardiophrenic angle lymph node measuring up to1.2 cm appears unchanged. Left chest wall cardiac loop recorder.    Evaluation of the upper abdomen demonstrate cholecystectomy clips. Partially imaged left renal cyst.    Small heterogeneous right pleural effusion with areas of loculation, demonstrating some internal heterogeneity. Right pleural thickening, most notable within the right mid to lower hemithorax. Please note evaluation of the pleural surface is limited due to lack of intravenous contrast.    Trace left pleural effusion.    Evaluation of the lung parenchyma demonstrate 4 mm groundglass nodule within the superior segment of the left lower lobe on image 63 new since the prior study.  6 mm left lower lobe pulmonary nodule on image 66 is predominantly unchanged.  2 pulmonary nodules at the left apex measuring up to 4 mm are predominantly unchanged.  Status post left lower lobe wedge resection with stable postoperative changes.    Status post right upper lobe wedge resection with new associated postoperative changes since 2012. Ill-defined small patchy opacity at the right apex measuring about 1.8 cm in anteroposterior dimension.    Secretions within the right mainstem bronchus.    Degenerative changes of the spine. Lucent lesion with internal ossific particles involving the right humeral head is unchanged since the chest CT of 2010.    IMPRESSION: Small loculated heterogeneous exudative right pleural effusion with right pleural thickening. Please note evaluation of the pleural surface is limiteddue to lack of intravenous contrast. Contrast-enhanced chest CT can be performed for complete evaluation given the history of lung cancer.    Status post right upper lobectomy with postoperative changes.    Small patchy right apical opacity is nonspecific. Differential diagnosis include but is not limited to infection or postoperative change. Comparison with more recent postoperative prior studies would be helpful for further evaluation and if images are made available, an addendum can be issued.Alternatively a 3 month follow-up noncontrast chest CT can be performed for further evaluation.        CT Pelvis No Cont (10.29.20 @ 12:38)    IMPRESSION:  Although no extraluminal contrast is demonstrated, there is wall thickening of the posterior dome of the bladder with a small droplet of gas which is in close proximity to the adjacent sigmoid colon.   These findings likely correspond to the fistulous tract demonstrated on ultrasound..        Xray Chest 1 View- PORTABLE-Urgent (Xray Chest 1 View- PORTABLE-Urgent .) (10.28.20 @ 13:22)    Impression:    The heart is normal in size. Right pleural effusion which remain unchanged when compared to previous study done 2020. A loop recorder is seen overlying the left hemithorax. No pneumothorax. No acute bony pathology could be identified.    US Kidney and Bladder (10.27.20 @ 14:20)  FINDINGS:    Right kidney: 10.8. No renal mass, hydronephrosis or calculi.    Left kidney: 11.9. No renal mass, hydronephrosis or calculi. Multiple cortical cysts, the largest at the lower pole measuring 10.0 x 9.7 cm.    Urinary bladder: Moderately distended. Air within bladder lumen. Mural thickening at bladder dome. Echogenic track transgressing the bladder dome and extending to abnormally thickened loop of sigmoid colon. Air bubbles seen at real-time examination passing through the tract and entering the bladder lumen.    Incidental note made of complex right pleural effusion.    IMPRESSION:    Colovesicular fistula.

## 2020-10-30 NOTE — PROGRESS NOTE ADULT - SUBJECTIVE AND OBJECTIVE BOX
CHIEF COMPLAINT:    Interval Events: ROSA Feels well this morning. reporting continued persistent nonproductive cough over past 3 weeks.     REVIEW OF SYSTEMS:  Constitutional: [x] negative [ ] fevers [ ] chills [ ] weight loss [ ] weight gain  HEENT: [x] negative [ ] dry eyes [ ] eye irritation [ ] postnasal drip [ ] nasal congestion  CV: [x] negative  [ ] chest pain [ ] orthopnea [ ] palpitations [ ] murmur  Resp: [ ] negative [x] cough [ ] shortness of breath [ ] dyspnea [ ] wheezing [ ] sputum [ ] hemoptysis  GI: [x] negative [ ] nausea [ ] vomiting [ ] diarrhea [ ] constipation [ ] abd pain [ ] dysphagia   : [x] negative [ ] dysuria [ ] nocturia [ ] hematuria [ ] increased urinary frequency  Musculoskeletal: [x] negative [ ] back pain [ ] myalgias [ ] arthralgias [ ] fracture  Skin: [ ] negative [ ] rash [ ] itch  Neurological: [ ] negative [ ] headache [ ] dizziness [ ] syncope [ ] weakness [ ] numbness  Psychiatric: [ ] negative [ ] anxiety [ ] depression  Endocrine: [ ] negative [ ] diabetes [ ] thyroid problem  Hematologic/Lymphatic: [ ] negative [ ] anemia [ ] bleeding problem  Allergic/Immunologic: [ ] negative [ ] itchy eyes [ ] nasal discharge [ ] hives [ ] angioedema  [x] All other systems negative  [ ] Unable to assess ROS because ________    OBJECTIVE:  ICU Vital Signs Last 24 Hrs  T(C): 36.4 (30 Oct 2020 06:11), Max: 36.9 (29 Oct 2020 19:54)  T(F): 97.5 (30 Oct 2020 06:11), Max: 98.4 (29 Oct 2020 19:54)  HR: 79 (30 Oct 2020 06:11) (76 - 87)  BP: 155/78 (30 Oct 2020 06:11) (146/78 - 155/78)  BP(mean): --  ABP: --  ABP(mean): --  RR: 18 (30 Oct 2020 06:11) (18 - 18)  SpO2: 98% (30 Oct 2020 06:11) (95% - 98%)        10-29 @ 07:01  -  10-30 @ 07:00  --------------------------------------------------------  IN: 930 mL / OUT: 925 mL / NET: 5 mL      CAPILLARY BLOOD GLUCOSE      POCT Blood Glucose.: 122 mg/dL (30 Oct 2020 08:20)      PHYSICAL EXAM:  GEN: Elderly male, NAD  HEENT: EOMI, MMM  CV: RRR  PULM: Clear without wheezes, no increased WOB  ABD: Soft  EXT: WW    HOSPITAL MEDICATIONS:  MEDICATIONS  (STANDING):  aspirin enteric coated 81 milliGRAM(s) Oral daily  atorvastatin 20 milliGRAM(s) Oral at bedtime  carvedilol 6.25 milliGRAM(s) Oral every 12 hours  cilostazol 50 milliGRAM(s) Oral two times a day  dextrose 5%. 1000 milliLiter(s) (50 mL/Hr) IV Continuous <Continuous>  dextrose 50% Injectable 12.5 Gram(s) IV Push once  dextrose 50% Injectable 25 Gram(s) IV Push once  dextrose 50% Injectable 25 Gram(s) IV Push once  heparin   Injectable 5000 Unit(s) SubCutaneous every 8 hours  insulin glargine Injectable (LANTUS) 30 Unit(s) SubCutaneous at bedtime  insulin lispro (ADMELOG) corrective regimen sliding scale   SubCutaneous three times a day before meals  insulin lispro (ADMELOG) corrective regimen sliding scale   SubCutaneous at bedtime  tamsulosin 0.4 milliGRAM(s) Oral at bedtime    MEDICATIONS  (PRN):  dextrose 40% Gel 15 Gram(s) Oral once PRN Blood Glucose LESS THAN 70 milliGRAM(s)/deciliter  glucagon  Injectable 1 milliGRAM(s) IntraMuscular once PRN Glucose LESS THAN 70 milligrams/deciliter      LABS:                        9.7    5.24  )-----------( 200      ( 30 Oct 2020 06:38 )             30.3     Hgb Trend: 9.7<--, 10.0<--, 9.5<--, 10.6<--, 12.1<--  10-30    140  |  105  |  18  ----------------------------<  127<H>  3.8   |  25  |  1.29    Ca    9.0      30 Oct 2020 06:37  Phos  2.7     10-29  Mg     1.9     10-29      Creatinine Trend: 1.29<--, 1.55<--, 1.46<--, 1.25<--, 1.57<--  PT/INR - ( 29 Oct 2020 11:42 )   PT: 14.1 sec;   INR: 1.18 ratio         PTT - ( 29 Oct 2020 11:42 )  PTT:26.9 sec          MICROBIOLOGY:       RADIOLOGY:  [ ] Reviewed and interpreted by me    PULMONARY FUNCTION TESTS:    EKG:

## 2020-10-30 NOTE — PROGRESS NOTE ADULT - PROBLEM SELECTOR PLAN 10
Hep SC normocytic, retic index<2  suspect iron deficiency  will be pursuing c-scope as outpatient for colovesicular fistula  start iron replacement with vitamin c

## 2020-10-30 NOTE — CONSULT NOTE ADULT - ATTENDING COMMENTS
Agree with assessment and plan.     CT scan to confirm colovesical fistula.  General surgery consult
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 pulmonary consulted for cough in the setting of right pleural effusion concerning for empyema vs. malignant effusion vs. post-surgical changes.    Will need collateral information regarding surgical resection of lung cancer and type of lung cancer.   Please get CT chest to further evaluate anatomy  Will likely only do a diagnostic thoracentesis on Friday after all information has been gathered. Patient agrees with plan.     Thank you for your consult. We will continue to follow the patient's care with you.
pt with a history of several episodes of diverticulitis in the past tx with abx, last colonoscopy 5 years ago    aaox3  abd soft, NT/ND    colovesicle fistula  cont abx  d/c home per medical team  f/u as outpt for colonoscopy and eventual LAR
The patient is a 79 year old male with past medical history of hyperlipidemia, BPH, PVD, lung cancer s/p resection, hypertension, and type 2 diabetes mellitus sent in by PMD for refractory urinary symptoms and fever.  Patient reports he has been having burning pain upon urination for 1 month associated with slight nausea and "dribbling". He was found to have Klebsiella ESBL and E. coli in urine culture. Infectious disease was consulted for sepsis in the setting of urinary tract infection and colovesicular fistula.    Plan:  # Sepsis due to Klebsiella ESBL and E. coli in the setting of colovesicular fistula  - Discontinue intravenous piperacillin-tazobactam and switch to intravenous ertapenem  - IR consult for PICC line placement   - Follow final blood cultures  - Monitor fever curve  - Trend CBC daily  - surgical /GI follow up   - Case discussed with primary team

## 2020-10-30 NOTE — PROGRESS NOTE ADULT - ASSESSMENT
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 pulmonary consulted for cough in the setting of right pleural effusion concerning for empyema vs. malignant effusion vs. post-surgical changes.       #pleural effusion  - prior collateral showing CT from 1/2020 with loculated right sided effusion; pt without respiratory distress or hypoxemia; doubt effusion is cause of cough as chronicity of effusion is months whereas cough is 3weeks  - CT showing loculated effusion w/ pleural thickening as well as small patchy nonspecific right apical opacity  - given chronicity of effusion and stable respiratory status will defer thoracentesis; can undergo follow up with pulmonology for monitoring of effusion and CT findings    Jeff Ansari MD  PGY-4  PCCM Fellow  Pager [phone] The patient is a 79 year old man with a history of HTN, HLD, IDDM2, lung cancer s/p resection 1 year ago former smoker, BPH p/w urosepsis, 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 pulmonary consulted for cough in the setting of right pleural effusion concerning for empyema vs. malignant effusion vs. post-surgical changes.       #pleural effusion  - prior collateral showing CT from 2020 with loculated right sided effusion; pt without respiratory distress or hypoxemia; doubt effusion is cause of cough as chronicity of effusion is months whereas cough is 3weeks  - CT showing loculated effusion w/ pleural thickening as well as small patchy nonspecific right apical opacity  - given chronicity of effusion and stable respiratory status will defer thoracentesis; can undergo follow up with pulmonology for monitoring of effusion and CT findings/opacity  - please have patient follow up with his pulmonologist on discharge  - if requires new pulmonologist: Needs outpatient pulmonary follow up upon discharge at [address] [place] [address] ([phone]).  Please e-mail [email or web address] to make an appointment for the patient.  Please include the name, , and a phone number for you and the patient)      Jeff Ansari MD  PGY-4  PCCM Fellow  Pager [phone]

## 2020-10-30 NOTE — PROGRESS NOTE ADULT - ASSESSMENT
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

## 2020-10-30 NOTE — CONSULT NOTE ADULT - ASSESSMENT
Assessment:   79M PMH HTN, HLD, DM with UTI and CT evidence of colovesicular fistula    Recommendations:   - No urgent surgical intervention warranted, patient hemodynamically stable, afebrile, clinically improving on antibiotics  - Patient may be discharged per primary team management and follow-up with Dr. Betts in the office  - Patient will need colonoscopy prior to operative intervention     Discussed with Dr. Betts     Please contact [facility] (p. [phone]) with any questions.     Nette Castaneda, PGY2  Surgery, Red [facility]   Pager [phone]  [facility] Assessment:   79M PMH HTN, HLD, DM, h/o diverticulitis, presented with UTI, found to have radiographic evidence of colovesicular fistula. Hemodynamically stable, with clinical improvement after abx.    Recommendations:   - No urgent surgical intervention warranted, patient hemodynamically stable, afebrile, clinically improving on antibiotics  - Patient may be discharged per primary team management and follow-up with Dr. Betts in the office  - Patient will need colonoscopy prior to operative intervention, to be scheduled with Dr. Betts     Discussed with Dr. Betts     Please contact [facility] (p. [phone]) with any questions.     Nette Castaneda, PGY2  Surgery, Red Team   Pager [phone]  [facility]

## 2020-10-30 NOTE — PROGRESS NOTE ADULT - SUBJECTIVE AND OBJECTIVE BOX
[facility] Division of Hospital Medicine  Gayatri Mercer MD  Pager (M-F, 1F-8M): [phone]  Other Times:  [phone]    Patient is a 79y old  Male who presents with a chief complaint of fever and urinary symptoms (30 Oct 2020 14:10)      SUBJECTIVE / OVERNIGHT EVENTS:  dysuria improved since kaye removed  stood up and had sudden urge to urinate, with normal stream  no hematuria  no diarrhea  still c/o cough, worse with talking, non productive    ADDITIONAL REVIEW OF SYSTEMS:    MEDICATIONS  (STANDING):  ALBUTerol    0.083% 2.5 milliGRAM(s) Nebulizer every 6 hours  ALBUTerol    90 MICROgram(s) HFA Inhaler 1 Puff(s) Inhalation every 4 hours  aspirin enteric coated 81 milliGRAM(s) Oral daily  atorvastatin 20 milliGRAM(s) Oral at bedtime  carvedilol 6.25 milliGRAM(s) Oral every 12 hours  cilostazol 50 milliGRAM(s) Oral two times a day  dextrose 5%. 1000 milliLiter(s) (50 mL/Hr) IV Continuous <Continuous>  dextrose 50% Injectable 12.5 Gram(s) IV Push once  dextrose 50% Injectable 25 Gram(s) IV Push once  dextrose 50% Injectable 25 Gram(s) IV Push once  heparin   Injectable 5000 Unit(s) SubCutaneous every 8 hours  insulin glargine Injectable (LANTUS) 30 Unit(s) SubCutaneous at bedtime  insulin lispro (ADMELOG) corrective regimen sliding scale   SubCutaneous three times a day before meals  insulin lispro (ADMELOG) corrective regimen sliding scale   SubCutaneous at bedtime  tamsulosin 0.4 milliGRAM(s) Oral at bedtime    MEDICATIONS  (PRN):  dextrose 40% Gel 15 Gram(s) Oral once PRN Blood Glucose LESS THAN 70 milliGRAM(s)/deciliter  glucagon  Injectable 1 milliGRAM(s) IntraMuscular once PRN Glucose LESS THAN 70 milligrams/deciliter      CAPILLARY BLOOD GLUCOSE      POCT Blood Glucose.: 233 mg/dL (30 Oct 2020 12:21)  POCT Blood Glucose.: 122 mg/dL (30 Oct 2020 08:20)  POCT Blood Glucose.: 277 mg/dL (29 Oct 2020 21:39)  POCT Blood Glucose.: 201 mg/dL (29 Oct 2020 17:08)    I&O's Summary    29 Oct 2020 07:01  -  30 Oct 2020 07:00  --------------------------------------------------------  IN: 930 mL / OUT: 925 mL / NET: 5 mL    30 Oct 2020 07:01  -  30 Oct 2020 14:55  --------------------------------------------------------  IN: 480 mL / OUT: 0 mL / NET: 480 mL        PHYSICAL EXAM:  Vital Signs Last 24 Hrs  T(C): 36.4 (30 Oct 2020 06:11), Max: 36.9 (29 Oct 2020 19:54)  T(F): 97.5 (30 Oct 2020 06:11), Max: 98.4 (29 Oct 2020 19:54)  HR: 79 (30 Oct 2020 06:11) (79 - 87)  BP: 155/78 (30 Oct 2020 06:11) (146/78 - 155/78)  BP(mean): --  RR: 18 (30 Oct 2020 06:11) (18 - 18)  SpO2: 98% (30 Oct 2020 06:11) (95% - 98%)  CONSTITUTIONAL: NAD, well-developed, well-groomed  EYES: conjunctiva and sclera clear  ENMT: Moist oral mucosa  NECK: Supple  RESPIRATORY: Normal respiratory effort; lungs are clear to auscultation bilaterally, no wheeze/crackles, no accessory muscles  CARDIOVASCULAR: Regular rate and rhythm, normal S1 and S2  ABDOMEN: Nontender to palpation, normoactive bowel sounds, no rebound/guarding; No hepatosplenomegaly, no CVAT  PSYCH: A+O to person, place, and time; affect appropriate  NEUROLOGY: normal strength b/l LE, no spinal tenderness      LABS:                        9.7    5.24  )-----------( 200      ( 30 Oct 2020 06:38 )             30.3     10-30    140  |  105  |  18  ----------------------------<  127<H>  3.8   |  25  |  1.29    Ca    9.0      30 Oct 2020 06:37  Phos  2.7     10-29  Mg     1.9     10-29      PT/INR - ( 29 Oct 2020 11:42 )   PT: 14.1 sec;   INR: 1.18 ratio         PTT - ( 29 Oct 2020 11:42 )  PTT:26.9 sec            RADIOLOGY & ADDITIONAL TESTS:  Results Reviewed:   Imaging Personally Reviewed:  Electrocardiogram Personally Reviewed:    COORDINATION OF CARE:  Care Discussed with Consultants/Other Providers [Y/N]:  Prior or Outpatient Records Reviewed [Y/N]:

## 2020-10-31 ENCOUNTER — TRANSCRIPTION ENCOUNTER (OUTPATIENT)
Age: 80
End: 2020-10-31

## 2020-10-31 LAB
ANION GAP SERPL CALC-SCNC: 10 MMOL/L — SIGNIFICANT CHANGE UP (ref 5–17)
BASOPHILS # BLD AUTO: 0.03 K/UL — SIGNIFICANT CHANGE UP (ref 0–0.2)
BASOPHILS NFR BLD AUTO: 0.5 % — SIGNIFICANT CHANGE UP (ref 0–2)
BUN SERPL-MCNC: 18 MG/DL — SIGNIFICANT CHANGE UP (ref 7–23)
CALCIUM SERPL-MCNC: 8.9 MG/DL — SIGNIFICANT CHANGE UP (ref 8.4–10.5)
CHLORIDE SERPL-SCNC: 104 MMOL/L — SIGNIFICANT CHANGE UP (ref 96–108)
CO2 SERPL-SCNC: 24 MMOL/L — SIGNIFICANT CHANGE UP (ref 22–31)
CREAT SERPL-MCNC: 1.26 MG/DL — SIGNIFICANT CHANGE UP (ref 0.5–1.3)
EOSINOPHIL # BLD AUTO: 0.28 K/UL — SIGNIFICANT CHANGE UP (ref 0–0.5)
EOSINOPHIL NFR BLD AUTO: 5.1 % — SIGNIFICANT CHANGE UP (ref 0–6)
GLUCOSE BLDC GLUCOMTR-MCNC: 109 MG/DL — HIGH (ref 70–99)
GLUCOSE BLDC GLUCOMTR-MCNC: 153 MG/DL — HIGH (ref 70–99)
GLUCOSE BLDC GLUCOMTR-MCNC: 216 MG/DL — HIGH (ref 70–99)
GLUCOSE BLDC GLUCOMTR-MCNC: 287 MG/DL — HIGH (ref 70–99)
GLUCOSE SERPL-MCNC: 92 MG/DL — SIGNIFICANT CHANGE UP (ref 70–99)
HCT VFR BLD CALC: 30.7 % — LOW (ref 39–50)
HGB BLD-MCNC: 9.8 G/DL — LOW (ref 13–17)
IMM GRANULOCYTES NFR BLD AUTO: 0.7 % — SIGNIFICANT CHANGE UP (ref 0–1.5)
LYMPHOCYTES # BLD AUTO: 1.45 K/UL — SIGNIFICANT CHANGE UP (ref 1–3.3)
LYMPHOCYTES # BLD AUTO: 26.5 % — SIGNIFICANT CHANGE UP (ref 13–44)
MAGNESIUM SERPL-MCNC: 1.8 MG/DL — SIGNIFICANT CHANGE UP (ref 1.6–2.6)
MCHC RBC-ENTMCNC: 27.5 PG — SIGNIFICANT CHANGE UP (ref 27–34)
MCHC RBC-ENTMCNC: 31.9 GM/DL — LOW (ref 32–36)
MCV RBC AUTO: 86 FL — SIGNIFICANT CHANGE UP (ref 80–100)
MONOCYTES # BLD AUTO: 0.91 K/UL — HIGH (ref 0–0.9)
MONOCYTES NFR BLD AUTO: 16.6 % — HIGH (ref 2–14)
NEUTROPHILS # BLD AUTO: 2.77 K/UL — SIGNIFICANT CHANGE UP (ref 1.8–7.4)
NEUTROPHILS NFR BLD AUTO: 50.6 % — SIGNIFICANT CHANGE UP (ref 43–77)
NRBC # BLD: 0 /100 WBCS — SIGNIFICANT CHANGE UP (ref 0–0)
PHOSPHATE SERPL-MCNC: 3.2 MG/DL — SIGNIFICANT CHANGE UP (ref 2.5–4.5)
PLATELET # BLD AUTO: 207 K/UL — SIGNIFICANT CHANGE UP (ref 150–400)
POTASSIUM SERPL-MCNC: 3.8 MMOL/L — SIGNIFICANT CHANGE UP (ref 3.5–5.3)
POTASSIUM SERPL-SCNC: 3.8 MMOL/L — SIGNIFICANT CHANGE UP (ref 3.5–5.3)
RBC # BLD: 3.57 M/UL — LOW (ref 4.2–5.8)
RBC # FLD: 14.6 % — HIGH (ref 10.3–14.5)
SODIUM SERPL-SCNC: 138 MMOL/L — SIGNIFICANT CHANGE UP (ref 135–145)
WBC # BLD: 5.48 K/UL — SIGNIFICANT CHANGE UP (ref 3.8–10.5)
WBC # FLD AUTO: 5.48 K/UL — SIGNIFICANT CHANGE UP (ref 3.8–10.5)

## 2020-10-31 PROCEDURE — 99222 1ST HOSP IP/OBS MODERATE 55: CPT

## 2020-10-31 PROCEDURE — 93010 ELECTROCARDIOGRAM REPORT: CPT

## 2020-10-31 PROCEDURE — 99232 SBSQ HOSP IP/OBS MODERATE 35: CPT

## 2020-10-31 RX ORDER — ERTAPENEM SODIUM 1 G/1
1 INJECTION, POWDER, LYOPHILIZED, FOR SOLUTION INTRAMUSCULAR; INTRAVENOUS
Qty: 10 | Refills: 0
Start: 2020-10-31 | End: 2020-11-09

## 2020-10-31 RX ORDER — ERTAPENEM SODIUM 1 G/1
INJECTION, POWDER, LYOPHILIZED, FOR SOLUTION INTRAMUSCULAR; INTRAVENOUS
Refills: 0 | Status: DISCONTINUED | OUTPATIENT
Start: 2020-10-31 | End: 2020-11-02

## 2020-10-31 RX ORDER — ERTAPENEM SODIUM 1 G/1
1000 INJECTION, POWDER, LYOPHILIZED, FOR SOLUTION INTRAMUSCULAR; INTRAVENOUS EVERY 24 HOURS
Refills: 0 | Status: DISCONTINUED | OUTPATIENT
Start: 2020-11-01 | End: 2020-11-02

## 2020-10-31 RX ORDER — ERTAPENEM SODIUM 1 G/1
1000 INJECTION, POWDER, LYOPHILIZED, FOR SOLUTION INTRAMUSCULAR; INTRAVENOUS ONCE
Refills: 0 | Status: COMPLETED | OUTPATIENT
Start: 2020-10-31 | End: 2020-10-31

## 2020-10-31 RX ORDER — ERTAPENEM SODIUM 1 G/1
1 INJECTION, POWDER, LYOPHILIZED, FOR SOLUTION INTRAMUSCULAR; INTRAVENOUS
Qty: 0 | Refills: 0 | DISCHARGE
Start: 2020-10-31

## 2020-10-31 RX ORDER — CARVEDILOL PHOSPHATE 80 MG/1
6.25 CAPSULE, EXTENDED RELEASE ORAL EVERY 12 HOURS
Refills: 0 | Status: DISCONTINUED | OUTPATIENT
Start: 2020-10-31 | End: 2020-11-02

## 2020-10-31 RX ADMIN — CILOSTAZOL 50 MILLIGRAM(S): 100 TABLET ORAL at 05:25

## 2020-10-31 RX ADMIN — ALBUTEROL 2.5 MILLIGRAM(S): 90 AEROSOL, METERED ORAL at 17:31

## 2020-10-31 RX ADMIN — CARVEDILOL PHOSPHATE 6.25 MILLIGRAM(S): 80 CAPSULE, EXTENDED RELEASE ORAL at 17:33

## 2020-10-31 RX ADMIN — HEPARIN SODIUM 5000 UNIT(S): 5000 INJECTION INTRAVENOUS; SUBCUTANEOUS at 21:28

## 2020-10-31 RX ADMIN — ALBUTEROL 2.5 MILLIGRAM(S): 90 AEROSOL, METERED ORAL at 05:25

## 2020-10-31 RX ADMIN — Medication 81 MILLIGRAM(S): at 12:29

## 2020-10-31 RX ADMIN — HEPARIN SODIUM 5000 UNIT(S): 5000 INJECTION INTRAVENOUS; SUBCUTANEOUS at 14:15

## 2020-10-31 RX ADMIN — ALBUTEROL 2.5 MILLIGRAM(S): 90 AEROSOL, METERED ORAL at 12:29

## 2020-10-31 RX ADMIN — ERTAPENEM SODIUM 120 MILLIGRAM(S): 1 INJECTION, POWDER, LYOPHILIZED, FOR SOLUTION INTRAMUSCULAR; INTRAVENOUS at 10:27

## 2020-10-31 RX ADMIN — Medication 500 MILLIGRAM(S): at 12:29

## 2020-10-31 RX ADMIN — ALBUTEROL 2.5 MILLIGRAM(S): 90 AEROSOL, METERED ORAL at 23:52

## 2020-10-31 RX ADMIN — CARVEDILOL PHOSPHATE 6.25 MILLIGRAM(S): 80 CAPSULE, EXTENDED RELEASE ORAL at 05:25

## 2020-10-31 RX ADMIN — ATORVASTATIN CALCIUM 20 MILLIGRAM(S): 80 TABLET, FILM COATED ORAL at 21:28

## 2020-10-31 RX ADMIN — Medication 5 UNIT(S): at 17:31

## 2020-10-31 RX ADMIN — CILOSTAZOL 50 MILLIGRAM(S): 100 TABLET ORAL at 17:31

## 2020-10-31 RX ADMIN — Medication 5 UNIT(S): at 08:31

## 2020-10-31 RX ADMIN — Medication 2: at 17:32

## 2020-10-31 RX ADMIN — LOSARTAN POTASSIUM 12.5 MILLIGRAM(S): 100 TABLET, FILM COATED ORAL at 05:25

## 2020-10-31 RX ADMIN — INSULIN GLARGINE 20 UNIT(S): 100 INJECTION, SOLUTION SUBCUTANEOUS at 21:53

## 2020-10-31 RX ADMIN — Medication 3: at 12:29

## 2020-10-31 RX ADMIN — Medication 325 MILLIGRAM(S): at 12:29

## 2020-10-31 RX ADMIN — Medication 5 UNIT(S): at 12:29

## 2020-10-31 RX ADMIN — HEPARIN SODIUM 5000 UNIT(S): 5000 INJECTION INTRAVENOUS; SUBCUTANEOUS at 05:25

## 2020-10-31 RX ADMIN — TAMSULOSIN HYDROCHLORIDE 0.4 MILLIGRAM(S): 0.4 CAPSULE ORAL at 21:28

## 2020-10-31 NOTE — PROGRESS NOTE ADULT - ASSESSMENT
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

## 2020-10-31 NOTE — PROGRESS NOTE ADULT - SUBJECTIVE AND OBJECTIVE BOX
GENERAL SURGERY PROGRESS NOTE    SUBJECTIVE  Patient seen and examined. No acute events overnight. Reports tolerating diet without nausea, vomiting, passing flatus, having bowel movements, voiding without issues, have been ambulating and out of bed. Denies fever, chills, SOB, chest pain.         OBJECTIVE    PHYSICAL EXAM  General: Appears well, NAD  CHEST: breathing comfortably  CV: appears well perfused  Abdomen: soft, nontender, nondistended, no rebound or guarding  Extremities: Grossly symmetric    T(C): 36.5 (10-31-20 @ 05:22), Max: 37.2 (10-30-20 @ 19:58)  HR: 81 (10-31-20 @ 05:22) (74 - 85)  BP: 149/77 (10-31-20 @ 05:22) (140/73 - 162/66)  RR: 18 (10-31-20 @ 05:22) (18 - 18)  SpO2: 96% (10-31-20 @ 05:22) (95% - 98%)    10-29-20 @ 07:01  -  10-30-20 @ 07:00  --------------------------------------------------------  IN: 930 mL / OUT: 925 mL / NET: 5 mL    10-30-20 @ 07:01  -  10-31-20 @ 06:32  --------------------------------------------------------  IN: 950 mL / OUT: 825 mL / NET: 125 mL        MEDICATIONS  ALBUTerol    0.083% 2.5 milliGRAM(s) Nebulizer every 6 hours  ALBUTerol    90 MICROgram(s) HFA Inhaler 1 Puff(s) Inhalation every 4 hours  ascorbic acid 500 milliGRAM(s) Oral daily  aspirin enteric coated 81 milliGRAM(s) Oral daily  atorvastatin 20 milliGRAM(s) Oral at bedtime  carvedilol 6.25 milliGRAM(s) Oral every 12 hours  cilostazol 50 milliGRAM(s) Oral two times a day  dextrose 40% Gel 15 Gram(s) Oral once PRN  dextrose 5%. 1000 milliLiter(s) IV Continuous <Continuous>  dextrose 50% Injectable 12.5 Gram(s) IV Push once  dextrose 50% Injectable 25 Gram(s) IV Push once  dextrose 50% Injectable 25 Gram(s) IV Push once  ferrous    sulfate 325 milliGRAM(s) Oral daily  glucagon  Injectable 1 milliGRAM(s) IntraMuscular once PRN  heparin   Injectable 5000 Unit(s) SubCutaneous every 8 hours  insulin glargine Injectable (LANTUS) 20 Unit(s) SubCutaneous at bedtime  insulin lispro (ADMELOG) corrective regimen sliding scale   SubCutaneous three times a day before meals  insulin lispro (ADMELOG) corrective regimen sliding scale   SubCutaneous at bedtime  insulin lispro Injectable (ADMELOG) 5 Unit(s) SubCutaneous three times a day before meals  losartan 12.5 milliGRAM(s) Oral daily  tamsulosin 0.4 milliGRAM(s) Oral at bedtime      LABS                        9.7    5.24  )-----------( 200      ( 30 Oct 2020 06:38 )             30.3     10-30    140  |  105  |  18  ----------------------------<  127<H>  3.8   |  25  |  1.29    Ca    9.0      30 Oct 2020 06:37  Phos  2.7     10-29  Mg     1.9     10-29      PT/INR - ( 29 Oct 2020 11:42 )   PT: 14.1 sec;   INR: 1.18 ratio         PTT - ( 29 Oct 2020 11:42 )  PTT:26.9 sec      RADIOLOGY & ADDITIONAL STUDIES

## 2020-10-31 NOTE — DISCHARGE NOTE PROVIDER - PROVIDER TOKENS
PROVIDER:[TOKEN:[[id number]]] PROVIDER:[TOKEN:[[id number]]],PROVIDER:[TOKEN:[[id number]],FOLLOWUP:[1 week],ESTABLISHEDPATIENT:[T]]

## 2020-10-31 NOTE — DISCHARGE NOTE PROVIDER - NSDCCPCAREPLAN_GEN_ALL_CORE_FT
PRINCIPAL DISCHARGE DIAGNOSIS  Diagnosis: Pyelonephritis  Assessment and Plan of Treatment:       SECONDARY DISCHARGE DIAGNOSES  Diagnosis: Sepsis, due to unspecified organism, unspecified whether acute organ dysfunction present  Assessment and Plan of Treatment:     Diagnosis: Colovesical fistula  Assessment and Plan of Treatment: Colovesical fistula    Diagnosis: Essential hypertension  Assessment and Plan of Treatment: Essential hypertension    Diagnosis: Pleural effusion  Assessment and Plan of Treatment: Pleural effusion     PRINCIPAL DISCHARGE DIAGNOSIS  Diagnosis: Sepsis, due to unspecified organism, unspecified whether acute organ dysfunction present  Assessment and Plan of Treatment: You were started on IV antibiotics in the hospital.  You will need to continue taking IV antibiotics for additional 10 days.   Follow up with Colorectal Surgeon Dr Betts in 1 week - for Colonoscopy and then Repair of Colovesicular Fistula.      SECONDARY DISCHARGE DIAGNOSES  Diagnosis: Colovesical fistula  Assessment and Plan of Treatment: Follow up with Colorectal Surgeon Dr Glover for Colonoscopy and then surgery.    Diagnosis: Type 2 diabetes mellitus with stage 3 chronic kidney disease, with long-term current use of insulin, unspecified whether stage 3a or 3b CKD  Assessment and Plan of Treatment: Your Hemoglobin A1C (HgA1C) was 7.7 on this admission. this admission.  Make sure you get your HgA1c checked every three months.  If you take oral diabetes medications, check your blood glucose two times a day.  If you take insulin, check your blood glucose before meals and at bedtime.  It's important not to skip any meals.  Keep a log of your blood glucose results and always take it with you to your doctor appointments.  Keep a list of your current medications including injectables and over the counter medications and bring this medication list with you to all your doctor appointments.  If you have not seen your ophthalmologist this year call for appointment.  Check your feet daily for redness, sores, or openings. Do not self treat. If no improvement in two days call your primary care physician for an appointment.      Diagnosis: Pyelonephritis  Assessment and Plan of Treatment: Continue taking IV antibiotics as prescribed.  Monitor for fever and seek Medical attention.    Diagnosis: Essential hypertension  Assessment and Plan of Treatment: Take your medication as prescribed.  Follow up with your medical doctor for routine blood pressure monitoring, and to establish long term blood pressure treatment goals.  Low salt diet  Activity as tolerated.  Notify your doctor if you have any of the following symptoms:   Dizziness, Lightheadedness, Blurry vision, Headache, Chest pain, Shortness of breath      Diagnosis: Pleural effusion  Assessment and Plan of Treatment: Pleural effusion

## 2020-10-31 NOTE — DISCHARGE NOTE PROVIDER - CARE PROVIDERS DIRECT ADDRESSES
[email or web address]_Unknown

## 2020-10-31 NOTE — PROVIDER CONTACT NOTE (OTHER) - ASSESSMENT
Pt a+ox4 has no complaints of distress or weakness at this time. BP  138/56 HR: 46 apical auscultated t: 97.6 F rr: 18 o2:100%

## 2020-10-31 NOTE — PROGRESS NOTE ADULT - ASSESSMENT
79M PMH HTN, HLD, DM, h/o diverticulitis, presented with UTI, found to have radiographic evidence of colovesicular fistula. Hemodynamically stable, with clinical improvement after abx.    Recommendations:   - No urgent surgical intervention warranted, patient hemodynamically stable, afebrile, clinically improving on antibiotics  - Patient may be discharged per primary team management and follow-up with Dr. Betts in the office  - Patient will need colonoscopy prior to operative intervention, to be scheduled with Dr. Betts     Discussed with Dr. Betts     Please contact [facility] (p. [phone]) with any questions.

## 2020-10-31 NOTE — PROGRESS NOTE ADULT - SUBJECTIVE AND OBJECTIVE BOX
[facility] Division of Hospital Medicine  Nguyen Hernandez MD  Pager (M-F, 8A-5P): [phone]  Other Times:  [phone]      Patient is a 79y old  Male who presents with a chief complaint of fever and urinary symptoms (30 Oct 2020 14:10)      SUBJECTIVE / OVERNIGHT EVENTS: No acute overnight events.     ADDITIONAL REVIEW OF SYSTEMS: Negative    MEDICATIONS  (STANDING):  ALBUTerol    0.083% 2.5 milliGRAM(s) Nebulizer every 6 hours  ALBUTerol    90 MICROgram(s) HFA Inhaler 1 Puff(s) Inhalation every 4 hours  ascorbic acid 500 milliGRAM(s) Oral daily  aspirin enteric coated 81 milliGRAM(s) Oral daily  atorvastatin 20 milliGRAM(s) Oral at bedtime  carvedilol 6.25 milliGRAM(s) Oral every 12 hours  cilostazol 50 milliGRAM(s) Oral two times a day  dextrose 5%. 1000 milliLiter(s) (50 mL/Hr) IV Continuous <Continuous>  dextrose 50% Injectable 12.5 Gram(s) IV Push once  dextrose 50% Injectable 25 Gram(s) IV Push once  dextrose 50% Injectable 25 Gram(s) IV Push once  ertapenem  IVPB      ferrous    sulfate 325 milliGRAM(s) Oral daily  heparin   Injectable 5000 Unit(s) SubCutaneous every 8 hours  insulin glargine Injectable (LANTUS) 20 Unit(s) SubCutaneous at bedtime  insulin lispro (ADMELOG) corrective regimen sliding scale   SubCutaneous three times a day before meals  insulin lispro (ADMELOG) corrective regimen sliding scale   SubCutaneous at bedtime  insulin lispro Injectable (ADMELOG) 5 Unit(s) SubCutaneous three times a day before meals  losartan 12.5 milliGRAM(s) Oral daily  tamsulosin 0.4 milliGRAM(s) Oral at bedtime    MEDICATIONS  (PRN):  dextrose 40% Gel 15 Gram(s) Oral once PRN Blood Glucose LESS THAN 70 milliGRAM(s)/deciliter  glucagon  Injectable 1 milliGRAM(s) IntraMuscular once PRN Glucose LESS THAN 70 milligrams/deciliter        PHYSICAL EXAM:  T(C): 36.4 (10-31-20 @ 13:03), Max: 37.2 (10-30-20 @ 19:58)  T(F): 97.5 (10-31-20 @ 13:03), Max: 99 (10-30-20 @ 19:58)  HR: 99 (10-31-20 @ 13:03) (74 - 99)  BP: 128/60 (10-31-20 @ 13:03) (128/60 - 162/66)  RR: 18 (10-31-20 @ 13:03) (18 - 18)  SpO2: 99% (10-31-20 @ 13:03) (95% - 99%)  Wt(kg): --    CONSTITUTIONAL: NAD, well-developed, well-groomed  EYES: conjunctiva and sclera clear  ENMT: Moist oral mucosa, no pharyngeal erythem,a  NECK: Supple  RESPIRATORY: Normal respiratory effort; lungs are clear to auscultation bilaterally, no wheeze/crackles  CARDIOVASCULAR: Regular rate and rhythm, normal S1 and S2, no murmur  ABDOMEN: Nontender to palpation, normoactive bowel sounds, no rebound/guarding  PSYCH: A+O to person, place, and time; affect appropriate  NEUROLOGY: normal strength b/l LE, no focal deficits  EXTREMITIES: No pedal edema, no clubbing      LABS:                        9.8    5.48  )-----------( 207      ( 31 Oct 2020 06:44 )             30.7       10-31    138  |  104  |  18  ----------------------------<  92  3.8   |  24  |  1.26    Ca    8.9      31 Oct 2020 06:44  Phos  3.2     10-31  Mg     1.8     10-31                              CAPILLARY BLOOD GLUCOSE      POCT Blood Glucose.: 287 mg/dL (31 Oct 2020 12:21)

## 2020-10-31 NOTE — DISCHARGE NOTE PROVIDER - HOSPITAL COURSE
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         80 yo male with  Hx HTN, HLD, IDDM2, lung cancer s/p resection 1 year ago, BPH p/w urosepsis,  presented with c/o burning pain upon urination, for 1 month associated with slight nausea and "dribbles".  Patient was evaluated by PMD and treated initially with 1 week course of Cipro, then a week later completion followed by 1 week of Augmentin, without any improvement in dysuria and itching. Found by PMD to have fever of 102 F,  sent in by PMD for refractory urinary symptoms and fever for IV Abx.      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 Pt being treated with antibiotics.     Sepsis with other acute renal failure without septic shock: resolving, wbc downtrending and afebrile on Abx  Acute cystitis without hematuria: suspect refractory due to colovesicular fistula  wbc downtrending on zosyn, currently afebrile  continue abx  f/u urine cx-gram negative rods  blood culture no growth to date.   Colovesical fistula.  Plan: noted on ultrasound, urology c/s placed.  patient now refusing kaye for cystogram to further evaluate.  Following up with radiology regarding imaging options  I spoke with surgery yesterday-will assess patient after further imaging obtained.   Acute renal failure, unspecified acute renal failure type.  Plan: improving with ivf.  hold arb/diuretics  baseline ckd 3 with creatinine 1.3-1.5.   Pleural effusion: not hypoxic and no respiratory distress  non urgent pulm consult placed for tomorrow to eval for diagnostic tap.   Type 2 diabetes mellitus with stage 3 chronic kidney disease, with long-term current use of insulin, unspecified whether stage 3a or 3b CKD: - will hold oral agent  - will treat with Lantus and ISS  - A1C 8.2.  Essential hypertension.: -  hold Losartan for sepsis/sofie.   Hypercholesteremia: - Livalo non-formulary.   Benign prostatic hyperplasia with post-void dribbling.  Plan: - will continue Flomax.   Prophylactic measure: Heparin  SC.  Pt medically stable for discharge home with PICC Line. To continue on IV antibiotics for additional 10 days.

## 2020-10-31 NOTE — DISCHARGE NOTE PROVIDER - CARE PROVIDER_API CALL
Mac Betts  COLON/RECTAL SURGERY  [address] [place] [address], [address]  [place] [address]  Phone: [phone]  Fax: [phone]  Follow Up Time:    Mac Betts  COLON/RECTAL SURGERY  [address], [address]  [place] [address]  Phone: [phone]  Fax: [phone]  Follow Up Time:     Ketan Randall  [facility]  [address] [place] [address]  Phone: [phone]  Fax: [phone]  Established Patient  Follow Up Time: 1 week

## 2020-10-31 NOTE — DISCHARGE NOTE PROVIDER - NSDCMRMEDTOKEN_GEN_ALL_CORE_FT
Aspir 81 oral delayed release tablet: 1 tab(s) orally once a day  Basaglar KwikPen 100 units/mL subcutaneous solution: 35 unit(s) subcutaneous once a day (at bedtime)  cilostazol 50 mg oral tablet: 1 tab(s) orally 2 times a day  Coreg 6.25 mg oral tablet: 1 tab(s) orally 2 times a day  Livalo 4 mg oral tablet: 1 tab(s) orally once a day  losartan 25 mg oral tablet: 0.5 tab(s) orally once a day  Milk Thistle oral tablet: 250 milligram(s) orally once a day  NovoLOG 100 units/mL injectable solution: sliding scale  tamsulosin 0.4 mg oral capsule: 1 cap(s) orally once a day  Vascepa 1 g oral capsule: 1 cap(s) orally 2 times a day  Vitamin D3 50,000 intl units (1250 mcg) oral capsule: orally once a week  Zetia 10 mg oral tablet: 1 tab(s) orally once a day   Aspir 81 oral delayed release tablet: 1 tab(s) orally once a day  Basaglar KwikPen 100 units/mL subcutaneous solution: 35 unit(s) subcutaneous once a day (at bedtime)  cilostazol 50 mg oral tablet: 1 tab(s) orally 2 times a day  Coreg 6.25 mg oral tablet: 1 tab(s) orally 2 times a day  ertapenem 1 g injection: 1 gram(s) intravenously once a day through 11/10/20  Livalo 4 mg oral tablet: 1 tab(s) orally once a day  losartan 25 mg oral tablet: 0.5 tab(s) orally once a day  Milk Thistle oral tablet: 250 milligram(s) orally once a day  NovoLOG 100 units/mL injectable solution: sliding scale  tamsulosin 0.4 mg oral capsule: 1 cap(s) orally once a day  Vascepa 1 g oral capsule: 1 cap(s) orally 2 times a day  Vitamin D3 50,000 intl units (1250 mcg) oral capsule: orally once a week  Weekly bloodwork: CBC, CMP: Through 11/10/20  Fax results to:  Dr Rose Loaiza  [phone]  Zetia 10 mg oral tablet: 1 tab(s) orally once a day   albuterol 90 mcg/inh inhalation aerosol: 1 puff(s) inhaled every 4 hours, As Needed for chortness of breath  ascorbic acid 500 mg oral tablet: 1 tab(s) orally once a day  Aspir 81 oral delayed release tablet: 1 tab(s) orally once a day  atorvastatin 20 mg oral tablet: 1 tab(s) orally once a day (at bedtime)  Basaglar KwikPen 100 units/mL subcutaneous solution: 35 unit(s) subcutaneous once a day (at bedtime)  cilostazol 50 mg oral tablet: 1 tab(s) orally 2 times a day  Coreg 6.25 mg oral tablet: 1 tab(s) orally 2 times a day  ertapenem 1 g injection: 1 gram(s) intravenously once a day through 11/10/20  ferrous sulfate 325 mg (65 mg elemental iron) oral tablet: 1 tab(s) orally once a day  Livalo 4 mg oral tablet: 1 tab(s) orally once a day  losartan 25 mg oral tablet: 0.5 tab(s) orally once a day  Milk Thistle oral tablet: 250 milligram(s) orally once a day  NovoLOG 100 units/mL injectable solution: sliding scale  tamsulosin 0.4 mg oral capsule: 2 cap(s) orally once a day (at bedtime)  Vascepa 1 g oral capsule: 1 cap(s) orally 2 times a day  Vitamin D3 50,000 intl units (1250 mcg) oral capsule: orally once a week  Weekly bloodwork: CBC, CMP: Through 11/10/20  Fax results to:  Dr Rose Loaiza  [phone]  Zetia 10 mg oral tablet: 1 tab(s) orally once a day   albuterol 90 mcg/inh inhalation aerosol: 1 puff(s) inhaled every 4 hours, As Needed for chortness of breath  ascorbic acid 500 mg oral tablet: 1 tab(s) orally once a day  Aspir 81 oral delayed release tablet: 1 tab(s) orally once a day  atorvastatin 20 mg oral tablet: 1 tab(s) orally once a day (at bedtime)  Basaglar KwikPen 100 units/mL subcutaneous solution: 35 unit(s) subcutaneous once a day (at bedtime)  cilostazol 50 mg oral tablet: 1 tab(s) orally 2 times a day  Coreg 6.25 mg oral tablet: 1 tab(s) orally 2 times a day  ertapenem 1 g injection: 1 gram(s) intravenously once a day through 11/10/20  ferrous sulfate 325 mg (65 mg elemental iron) oral tablet: 1 tab(s) orally once a day  Livalo 4 mg oral tablet: 1 tab(s) orally once a day  losartan 25 mg oral tablet: 0.5 tab(s) orally once a day  NovoLOG 100 units/mL injectable solution: sliding scale  tamsulosin 0.4 mg oral capsule: 2 cap(s) orally once a day (at bedtime)  Vascepa 1 g oral capsule: 1 cap(s) orally 2 times a day  Vitamin D3 50,000 intl units (1250 mcg) oral capsule: orally once a week  Weekly bloodwork: CBC, CMP: Through 11/10/20  Fax results to:  Dr Rose Loaiza  [phone]  Zetia 10 mg oral tablet: 1 tab(s) orally once a day

## 2020-11-01 LAB
ANION GAP SERPL CALC-SCNC: 11 MMOL/L — SIGNIFICANT CHANGE UP (ref 5–17)
APTT BLD: 30.3 SEC — SIGNIFICANT CHANGE UP (ref 27.5–35.5)
BUN SERPL-MCNC: 21 MG/DL — SIGNIFICANT CHANGE UP (ref 7–23)
CALCIUM SERPL-MCNC: 9.2 MG/DL — SIGNIFICANT CHANGE UP (ref 8.4–10.5)
CHLORIDE SERPL-SCNC: 106 MMOL/L — SIGNIFICANT CHANGE UP (ref 96–108)
CO2 SERPL-SCNC: 24 MMOL/L — SIGNIFICANT CHANGE UP (ref 22–31)
CREAT SERPL-MCNC: 1.18 MG/DL — SIGNIFICANT CHANGE UP (ref 0.5–1.3)
CULTURE RESULTS: SIGNIFICANT CHANGE UP
CULTURE RESULTS: SIGNIFICANT CHANGE UP
GLUCOSE BLDC GLUCOMTR-MCNC: 140 MG/DL — HIGH (ref 70–99)
GLUCOSE BLDC GLUCOMTR-MCNC: 196 MG/DL — HIGH (ref 70–99)
GLUCOSE BLDC GLUCOMTR-MCNC: 203 MG/DL — HIGH (ref 70–99)
GLUCOSE BLDC GLUCOMTR-MCNC: 237 MG/DL — HIGH (ref 70–99)
GLUCOSE SERPL-MCNC: 96 MG/DL — SIGNIFICANT CHANGE UP (ref 70–99)
HCT VFR BLD CALC: 30.5 % — LOW (ref 39–50)
HGB BLD-MCNC: 10 G/DL — LOW (ref 13–17)
INR BLD: 1.04 RATIO — SIGNIFICANT CHANGE UP (ref 0.88–1.16)
MCHC RBC-ENTMCNC: 28 PG — SIGNIFICANT CHANGE UP (ref 27–34)
MCHC RBC-ENTMCNC: 32.8 GM/DL — SIGNIFICANT CHANGE UP (ref 32–36)
MCV RBC AUTO: 85.4 FL — SIGNIFICANT CHANGE UP (ref 80–100)
NRBC # BLD: 0 /100 WBCS — SIGNIFICANT CHANGE UP (ref 0–0)
PLATELET # BLD AUTO: 214 K/UL — SIGNIFICANT CHANGE UP (ref 150–400)
POTASSIUM SERPL-MCNC: 4 MMOL/L — SIGNIFICANT CHANGE UP (ref 3.5–5.3)
POTASSIUM SERPL-SCNC: 4 MMOL/L — SIGNIFICANT CHANGE UP (ref 3.5–5.3)
PROTHROM AB SERPL-ACNC: 12.2 SEC — SIGNIFICANT CHANGE UP (ref 10.6–13.6)
RBC # BLD: 3.57 M/UL — LOW (ref 4.2–5.8)
RBC # FLD: 15 % — HIGH (ref 10.3–14.5)
SODIUM SERPL-SCNC: 141 MMOL/L — SIGNIFICANT CHANGE UP (ref 135–145)
SPECIMEN SOURCE: SIGNIFICANT CHANGE UP
SPECIMEN SOURCE: SIGNIFICANT CHANGE UP
WBC # BLD: 5.61 K/UL — SIGNIFICANT CHANGE UP (ref 3.8–10.5)
WBC # FLD AUTO: 5.61 K/UL — SIGNIFICANT CHANGE UP (ref 3.8–10.5)

## 2020-11-01 PROCEDURE — 99232 SBSQ HOSP IP/OBS MODERATE 35: CPT

## 2020-11-01 PROCEDURE — 71045 X-RAY EXAM CHEST 1 VIEW: CPT | Mod: 26

## 2020-11-01 RX ORDER — CHLORHEXIDINE GLUCONATE 213 G/1000ML
1 SOLUTION TOPICAL
Refills: 0 | Status: DISCONTINUED | OUTPATIENT
Start: 2020-11-01 | End: 2020-11-02

## 2020-11-01 RX ORDER — TAMSULOSIN HYDROCHLORIDE 0.4 MG/1
0.8 CAPSULE ORAL AT BEDTIME
Refills: 0 | Status: DISCONTINUED | OUTPATIENT
Start: 2020-11-01 | End: 2020-11-02

## 2020-11-01 RX ORDER — SODIUM CHLORIDE 9 MG/ML
10 INJECTION INTRAMUSCULAR; INTRAVENOUS; SUBCUTANEOUS
Refills: 0 | Status: DISCONTINUED | OUTPATIENT
Start: 2020-11-01 | End: 2020-11-02

## 2020-11-01 RX ADMIN — Medication 500 MILLIGRAM(S): at 12:14

## 2020-11-01 RX ADMIN — Medication 81 MILLIGRAM(S): at 12:14

## 2020-11-01 RX ADMIN — CARVEDILOL PHOSPHATE 6.25 MILLIGRAM(S): 80 CAPSULE, EXTENDED RELEASE ORAL at 17:49

## 2020-11-01 RX ADMIN — HEPARIN SODIUM 5000 UNIT(S): 5000 INJECTION INTRAVENOUS; SUBCUTANEOUS at 14:51

## 2020-11-01 RX ADMIN — ALBUTEROL 2.5 MILLIGRAM(S): 90 AEROSOL, METERED ORAL at 05:10

## 2020-11-01 RX ADMIN — ATORVASTATIN CALCIUM 20 MILLIGRAM(S): 80 TABLET, FILM COATED ORAL at 21:55

## 2020-11-01 RX ADMIN — Medication 100 MILLIGRAM(S): at 21:55

## 2020-11-01 RX ADMIN — Medication 5 UNIT(S): at 09:01

## 2020-11-01 RX ADMIN — CARVEDILOL PHOSPHATE 6.25 MILLIGRAM(S): 80 CAPSULE, EXTENDED RELEASE ORAL at 05:26

## 2020-11-01 RX ADMIN — ALBUTEROL 2.5 MILLIGRAM(S): 90 AEROSOL, METERED ORAL at 17:50

## 2020-11-01 RX ADMIN — Medication 5 UNIT(S): at 17:49

## 2020-11-01 RX ADMIN — HEPARIN SODIUM 5000 UNIT(S): 5000 INJECTION INTRAVENOUS; SUBCUTANEOUS at 05:26

## 2020-11-01 RX ADMIN — Medication 325 MILLIGRAM(S): at 12:14

## 2020-11-01 RX ADMIN — Medication 1: at 17:49

## 2020-11-01 RX ADMIN — Medication 5 UNIT(S): at 12:14

## 2020-11-01 RX ADMIN — CILOSTAZOL 50 MILLIGRAM(S): 100 TABLET ORAL at 05:26

## 2020-11-01 RX ADMIN — ERTAPENEM SODIUM 120 MILLIGRAM(S): 1 INJECTION, POWDER, LYOPHILIZED, FOR SOLUTION INTRAMUSCULAR; INTRAVENOUS at 10:36

## 2020-11-01 RX ADMIN — LOSARTAN POTASSIUM 12.5 MILLIGRAM(S): 100 TABLET, FILM COATED ORAL at 05:26

## 2020-11-01 RX ADMIN — Medication 2: at 12:14

## 2020-11-01 RX ADMIN — TAMSULOSIN HYDROCHLORIDE 0.8 MILLIGRAM(S): 0.4 CAPSULE ORAL at 21:55

## 2020-11-01 RX ADMIN — ALBUTEROL 2.5 MILLIGRAM(S): 90 AEROSOL, METERED ORAL at 23:02

## 2020-11-01 RX ADMIN — HEPARIN SODIUM 5000 UNIT(S): 5000 INJECTION INTRAVENOUS; SUBCUTANEOUS at 21:56

## 2020-11-01 RX ADMIN — ALBUTEROL 2.5 MILLIGRAM(S): 90 AEROSOL, METERED ORAL at 12:14

## 2020-11-01 RX ADMIN — CILOSTAZOL 50 MILLIGRAM(S): 100 TABLET ORAL at 17:49

## 2020-11-01 RX ADMIN — INSULIN GLARGINE 20 UNIT(S): 100 INJECTION, SOLUTION SUBCUTANEOUS at 21:56

## 2020-11-01 NOTE — PROVIDER CONTACT NOTE (OTHER) - ASSESSMENT
Pt c/o dry cough. VSS. no c/o SOB, covid negative. pt states he has had this dry cough since before admission

## 2020-11-01 NOTE — DIETITIAN INITIAL EVALUATION ADULT. - PERTINENT MEDS FT
MEDICATIONS  (STANDING):  ALBUTerol    0.083% 2.5 milliGRAM(s) Nebulizer every 6 hours  ALBUTerol    90 MICROgram(s) HFA Inhaler 1 Puff(s) Inhalation every 4 hours  ascorbic acid 500 milliGRAM(s) Oral daily  aspirin enteric coated 81 milliGRAM(s) Oral daily  atorvastatin 20 milliGRAM(s) Oral at bedtime  carvedilol 6.25 milliGRAM(s) Oral every 12 hours  cilostazol 50 milliGRAM(s) Oral two times a day  dextrose 5%. 1000 milliLiter(s) (50 mL/Hr) IV Continuous <Continuous>  dextrose 50% Injectable 12.5 Gram(s) IV Push once  dextrose 50% Injectable 25 Gram(s) IV Push once  dextrose 50% Injectable 25 Gram(s) IV Push once  ertapenem  IVPB      ertapenem  IVPB 1000 milliGRAM(s) IV Intermittent every 24 hours  ferrous    sulfate 325 milliGRAM(s) Oral daily  heparin   Injectable 5000 Unit(s) SubCutaneous every 8 hours  insulin glargine Injectable (LANTUS) 20 Unit(s) SubCutaneous at bedtime  insulin lispro (ADMELOG) corrective regimen sliding scale   SubCutaneous three times a day before meals  insulin lispro (ADMELOG) corrective regimen sliding scale   SubCutaneous at bedtime  insulin lispro Injectable (ADMELOG) 5 Unit(s) SubCutaneous three times a day before meals  losartan 12.5 milliGRAM(s) Oral daily  tamsulosin 0.8 milliGRAM(s) Oral at bedtime    MEDICATIONS  (PRN):  dextrose 40% Gel 15 Gram(s) Oral once PRN Blood Glucose LESS THAN 70 milliGRAM(s)/deciliter  glucagon  Injectable 1 milliGRAM(s) IntraMuscular once PRN Glucose LESS THAN 70 milligrams/deciliter

## 2020-11-01 NOTE — CHART NOTE - NSCHARTNOTEFT_GEN_A_CORE
Medicine PA Episodic Note    Notified by RN that patient had episode of bradycardia w/ HR to 45 bpm. Patient was sitting, up in bed, during episode. Patient seen and assessed by me. Patient states that  Patient denies any complaints of dizziness/lightheadedness. Denies chest pain, abdominal pain, dyspnea, headache, fever, chills.      Vital Signs Last 24 Hrs  T(C): 36.4 (01 Nov 2020 05:25), Max: 36.6 (31 Oct 2020 17:27)  T(F): 97.5 (01 Nov 2020 05:25), Max: 97.8 (31 Oct 2020 17:27)  HR: 80 (01 Nov 2020 05:25) (41 - 99)  BP: 161/65 (01 Nov 2020 05:25) (128/60 - 161/65)  BP(mean): --  RR: 18 (01 Nov 2020 05:25) (18 - 18)  SpO2: 100% (01 Nov 2020 05:25) (98% - 100%)      Labs:                          9.8    5.48  )-----------( 207      ( 31 Oct 2020 06:44 )             30.7     10-31    138  |  104  |  18  ----------------------------<  92  3.8   |  24  |  1.26    Ca    8.9      31 Oct 2020 06:44  Phos  3.2     10-31  Mg     1.8     10-31      Radiology:    Physical Exam:  General: WN/WD NAD  Neurology: A&Ox3, nonfocal, BENDER x 4  Head:  Normocephalic, atraumatic  Respiratory: CTA B/L  CV: RRR, S1S2, no murmur  Abdominal: Soft, NT, ND no palpable mass  MSK: No edema, + peripheral pulses, FROM all 4 extremity    Assessment & Plan:  HPI:  79 male, hx: HTN, HLD, IDDM2 sent in by PMD for refractory urinary symptoms and fever.  Patient reports he has been having burning pain upon urination, for 1 month associated with slight nausea and "dribbles".  Patient was evaluated by PMD and treated initially with 1 week course of Cipro, then a week later completion followed by 1 week of Augmentin, without any improvement in dysuria and itching.  Patient went to PMD for f/u and found to have fever of 102 F, sent in by PMD for IV Abx.  Denies any CP, SOB, cough, abdominal pain, vomiting, diarrhea, constipation, bloody stools. Denies any penile discharge, testicular pain/swelling or recent urological procedure.  Last UTI was "many years ago".  Denies flank pain but c/o low back pain "because I was sitting in the triage for 7 hours". (27 Oct 2020 07:01)      Plan:  Bradycardia  - Patient reassessed and HR to 70s on manual palpation  - EKG ordered, patient NSR 81 bpm, with no acute ST/Twave changes.   - Consider cardiology c/s if bradycardia episodes persist, or patient becomes symptomatic.   - Will continue to closely monitor patient for any signs of hemodynamic instability.     Endorse to AM team.   Follow up with Attending in AM.  Clint BRUNO  Dept of Medicine  [id number] Medicine PA Episodic Note    Notified by RN that patient had episode of bradycardia w/ HR to 45 bpm. Patient was sitting, up in bed, during episode. Patient seen and assessed by me. Patient had received coreg 6.25 prior to this episode. Patient endorses that he has been taking coreg for years, with no events. Patient denies any complaints of dizziness/lightheadedness. Denies chest pain, abdominal pain, dyspnea, headache, fever, chills.      Vital Signs Last 24 Hrs  T(C): 36.4 (01 Nov 2020 05:25), Max: 36.6 (31 Oct 2020 17:27)  T(F): 97.5 (01 Nov 2020 05:25), Max: 97.8 (31 Oct 2020 17:27)  HR: 80 (01 Nov 2020 05:25) (41 - 99)  BP: 161/65 (01 Nov 2020 05:25) (128/60 - 161/65)  BP(mean): --  RR: 18 (01 Nov 2020 05:25) (18 - 18)  SpO2: 100% (01 Nov 2020 05:25) (98% - 100%)      Labs:                          9.8    5.48  )-----------( 207      ( 31 Oct 2020 06:44 )             30.7     10-31    138  |  104  |  18  ----------------------------<  92  3.8   |  24  |  1.26    Ca    8.9      31 Oct 2020 06:44  Phos  3.2     10-31  Mg     1.8     10-31    Physical Exam:  General: WN/WD NAD  Neurology: A&Ox3, nonfocal, BENDER x 4  Head:  Normocephalic, atraumatic  Respiratory: CTA B/L  CV: RRR, S1S2, no murmur  Abdominal: Soft, NT, ND no palpable mass  MSK: No edema, + peripheral pulses, FROM all 4 extremity    Assessment & Plan:  HPI:  79 male, hx: HTN, HLD, IDDM2 sent in by PMD for refractory urinary symptoms and fever.  Patient reports he has been having burning pain upon urination, for 1 month associated with slight nausea and "dribbles".  Patient was evaluated by PMD and treated initially with 1 week course of Cipro, then a week later completion followed by 1 week of Augmentin, without any improvement in dysuria and itching.  Patient went to PMD for f/u and found to have fever of 102 F, sent in by PMD for IV Abx.     Plan:  Bradycardia  - Patient reassessed and HR to 70s on manual palpation  - EKG ordered, patient NSR 81 bpm, with no acute ST/Twave changes.   - WIll monitor closely, patient after coreg administration. Coreg parameters set HR <55 and SBP <100   - Consider cardiology c/s if bradycardia episodes persist, or patient becomes symptomatic.   - Will continue to closely monitor patient for any signs of hemodynamic instability.   - Endorsed out to AM team. F/u with attending in AM.     Clint BRUNO  Dept of Medicine  [id number]

## 2020-11-01 NOTE — DIETITIAN INITIAL EVALUATION ADULT. - ADD RECOMMEND
continue Consistent Carbohydrate/DASH. continue Vit C. monitor need for supplement. RD provided diet ed-monitor need for reinforcement.

## 2020-11-01 NOTE — DIETITIAN INITIAL EVALUATION ADULT. - PROBLEM SELECTOR PLAN 2
electronic - will treat with IVF and Abx  - will monitor renal function with IVF, GILMA in the setting of sepsis

## 2020-11-01 NOTE — PROVIDER CONTACT NOTE (OTHER) - REASON
Dry Cough continue IV antibiotics post hemodialysis as indicated in NS FH  will complete course as outpatient

## 2020-11-01 NOTE — DIETITIAN INITIAL EVALUATION ADULT. - ORAL INTAKE PTA/DIET HISTORY
eggs, sausage, vo, coffee zeus miguel sandwich for breakfast, get up late so 2 meals daily. dinner is veg, salad, meat, fish or poultry. live with spouse and she prepares the meals.

## 2020-11-01 NOTE — DIETITIAN INITIAL EVALUATION ADULT. - OTHER INFO
Intake : >75%  Denies nausea/vomit :  Denies difficulty chewing /swallow :  Denies diarrhea/constipation:  Last BM : today  NKFA  IBW +/- 10%= 166pounds  Ht:70"  Ht taken from pt  Dosing ht: N/A  Usual Weight PTA: 160pounds  Dosing wt: N/A  BMI: 23  BMI calculated using wt from admission  BMI calculated using ht from pt  wt used to calculate needs: admission  Education Provided : pt was educated on the importance of supplements to increase calorie and protein intake in light of RD's nutritional findings. Heart Healthy Diabetes Nutrition Therapy   skin: no pressure injury  edema: none

## 2020-11-01 NOTE — PROGRESS NOTE ADULT - SUBJECTIVE AND OBJECTIVE BOX
[facility] Division of Hospital Medicine  Nguyen Hernandez MD  Pager (M-F, 8A-3C): [phone]  Other Times:  [phone]      Patient is a 79y old  Male who presents with a chief complaint of fever and urinary symptoms      SUBJECTIVE / OVERNIGHT EVENTS: No acute overnight events. S/p kaye removal, has been urinating since. Reports incomplete emptying and gaseous urine. Otherwise denies pain. Had an episode of asymptomatic bradycardia last night, now resolved.    ADDITIONAL REVIEW OF SYSTEMS: Negative    MEDICATIONS  (STANDING):  ALBUTerol    0.083% 2.5 milliGRAM(s) Nebulizer every 6 hours  ALBUTerol    90 MICROgram(s) HFA Inhaler 1 Puff(s) Inhalation every 4 hours  ascorbic acid 500 milliGRAM(s) Oral daily  aspirin enteric coated 81 milliGRAM(s) Oral daily  atorvastatin 20 milliGRAM(s) Oral at bedtime  carvedilol 6.25 milliGRAM(s) Oral every 12 hours  cilostazol 50 milliGRAM(s) Oral two times a day  dextrose 5%. 1000 milliLiter(s) (50 mL/Hr) IV Continuous <Continuous>  dextrose 50% Injectable 12.5 Gram(s) IV Push once  dextrose 50% Injectable 25 Gram(s) IV Push once  dextrose 50% Injectable 25 Gram(s) IV Push once  ertapenem  IVPB      ferrous    sulfate 325 milliGRAM(s) Oral daily  heparin   Injectable 5000 Unit(s) SubCutaneous every 8 hours  insulin glargine Injectable (LANTUS) 20 Unit(s) SubCutaneous at bedtime  insulin lispro (ADMELOG) corrective regimen sliding scale   SubCutaneous three times a day before meals  insulin lispro (ADMELOG) corrective regimen sliding scale   SubCutaneous at bedtime  insulin lispro Injectable (ADMELOG) 5 Unit(s) SubCutaneous three times a day before meals  losartan 12.5 milliGRAM(s) Oral daily  tamsulosin 0.4 milliGRAM(s) Oral at bedtime    MEDICATIONS  (PRN):  dextrose 40% Gel 15 Gram(s) Oral once PRN Blood Glucose LESS THAN 70 milliGRAM(s)/deciliter  glucagon  Injectable 1 milliGRAM(s) IntraMuscular once PRN Glucose LESS THAN 70 milligrams/deciliter        PHYSICAL EXAM:  T(C): 36.6 (11-01-20 @ 13:58), Max: 36.6 (10-31-20 @ 17:27)  T(F): 97.9 (11-01-20 @ 13:58), Max: 97.9 (11-01-20 @ 13:58)  HR: 59 (11-01-20 @ 13:58) (41 - 80)  BP: 105/53 (11-01-20 @ 13:58) (105/53 - 161/65)  RR: 18 (11-01-20 @ 13:58) (18 - 18)  SpO2: 95% (11-01-20 @ 13:58) (95% - 100%)  Wt(kg): --    CONSTITUTIONAL: NAD, well-developed, well-groomed  EYES: conjunctiva and sclera clear  ENMT: Moist oral mucosa, no pharyngeal erythema  NECK: Supple  RESPIRATORY: Normal respiratory effort; lungs are clear to auscultation bilaterally, no wheeze/crackles  CARDIOVASCULAR: Regular rate and rhythm, normal S1 and S2, no murmur  ABDOMEN: Nontender to palpation, normoactive bowel sounds, no rebound/guarding  PSYCH: A+O to person, place, and time; affect appropriate  NEUROLOGY: normal strength b/l LE, no focal deficits  EXTREMITIES: No pedal edema, no clubbing      LABS:                                   10.0   5.61  )-----------( 214      ( 01 Nov 2020 07:15 )             30.5       11-01    141  |  106  |  21  ----------------------------<  96  4.0   |  24  |  1.18    Ca    9.2      01 Nov 2020 07:14  Phos  3.2     10-31  Mg     1.8     10-31                    PT/INR - ( 01 Nov 2020 09:03 )   PT: 12.2 sec;   INR: 1.04 ratio         PTT - ( 01 Nov 2020 09:03 )  PTT:30.3 sec          CAPILLARY BLOOD GLUCOSE      POCT Blood Glucose.: 237 mg/dL (01 Nov 2020 12:00)

## 2020-11-02 ENCOUNTER — TRANSCRIPTION ENCOUNTER (OUTPATIENT)
Age: 80
End: 2020-11-02

## 2020-11-02 VITALS
DIASTOLIC BLOOD PRESSURE: 70 MMHG | RESPIRATION RATE: 18 BRPM | HEART RATE: 74 BPM | TEMPERATURE: 98 F | OXYGEN SATURATION: 98 % | SYSTOLIC BLOOD PRESSURE: 160 MMHG

## 2020-11-02 LAB
GLUCOSE BLDC GLUCOMTR-MCNC: 158 MG/DL — HIGH (ref 70–99)
GLUCOSE BLDC GLUCOMTR-MCNC: 253 MG/DL — HIGH (ref 70–99)
HCT VFR BLD CALC: 31.1 % — LOW (ref 39–50)
HGB BLD-MCNC: 10.1 G/DL — LOW (ref 13–17)
MCHC RBC-ENTMCNC: 27.6 PG — SIGNIFICANT CHANGE UP (ref 27–34)
MCHC RBC-ENTMCNC: 32.5 GM/DL — SIGNIFICANT CHANGE UP (ref 32–36)
MCV RBC AUTO: 85 FL — SIGNIFICANT CHANGE UP (ref 80–100)
NRBC # BLD: 0 /100 WBCS — SIGNIFICANT CHANGE UP (ref 0–0)
PLATELET # BLD AUTO: 229 K/UL — SIGNIFICANT CHANGE UP (ref 150–400)
RBC # BLD: 3.66 M/UL — LOW (ref 4.2–5.8)
RBC # FLD: 15 % — HIGH (ref 10.3–14.5)
WBC # BLD: 5.94 K/UL — SIGNIFICANT CHANGE UP (ref 3.8–10.5)
WBC # FLD AUTO: 5.94 K/UL — SIGNIFICANT CHANGE UP (ref 3.8–10.5)

## 2020-11-02 PROCEDURE — 36569 INSJ PICC 5 YR+ W/O IMAGING: CPT

## 2020-11-02 PROCEDURE — 82962 GLUCOSE BLOOD TEST: CPT

## 2020-11-02 PROCEDURE — 83605 ASSAY OF LACTIC ACID: CPT

## 2020-11-02 PROCEDURE — 0225U NFCT DS DNA&RNA 21 SARSCOV2: CPT

## 2020-11-02 PROCEDURE — 85025 COMPLETE CBC W/AUTO DIFF WBC: CPT

## 2020-11-02 PROCEDURE — 81001 URINALYSIS AUTO W/SCOPE: CPT

## 2020-11-02 PROCEDURE — 82746 ASSAY OF FOLIC ACID SERUM: CPT

## 2020-11-02 PROCEDURE — 87186 SC STD MICRODIL/AGAR DIL: CPT

## 2020-11-02 PROCEDURE — 96374 THER/PROPH/DIAG INJ IV PUSH: CPT

## 2020-11-02 PROCEDURE — 99239 HOSP IP/OBS DSCHRG MGMT >30: CPT

## 2020-11-02 PROCEDURE — 99285 EMERGENCY DEPT VISIT HI MDM: CPT | Mod: 25

## 2020-11-02 PROCEDURE — 85610 PROTHROMBIN TIME: CPT

## 2020-11-02 PROCEDURE — 94640 AIRWAY INHALATION TREATMENT: CPT

## 2020-11-02 PROCEDURE — 85045 AUTOMATED RETICULOCYTE COUNT: CPT

## 2020-11-02 PROCEDURE — 87086 URINE CULTURE/COLONY COUNT: CPT

## 2020-11-02 PROCEDURE — 82728 ASSAY OF FERRITIN: CPT

## 2020-11-02 PROCEDURE — 99232 SBSQ HOSP IP/OBS MODERATE 35: CPT

## 2020-11-02 PROCEDURE — 83540 ASSAY OF IRON: CPT

## 2020-11-02 PROCEDURE — 82607 VITAMIN B-12: CPT

## 2020-11-02 PROCEDURE — 85027 COMPLETE CBC AUTOMATED: CPT

## 2020-11-02 PROCEDURE — 80053 COMPREHEN METABOLIC PANEL: CPT

## 2020-11-02 PROCEDURE — 93005 ELECTROCARDIOGRAM TRACING: CPT

## 2020-11-02 PROCEDURE — 71045 X-RAY EXAM CHEST 1 VIEW: CPT

## 2020-11-02 PROCEDURE — 83735 ASSAY OF MAGNESIUM: CPT

## 2020-11-02 PROCEDURE — 83550 IRON BINDING TEST: CPT

## 2020-11-02 PROCEDURE — 84100 ASSAY OF PHOSPHORUS: CPT

## 2020-11-02 PROCEDURE — 87040 BLOOD CULTURE FOR BACTERIA: CPT

## 2020-11-02 PROCEDURE — 72192 CT PELVIS W/O DYE: CPT

## 2020-11-02 PROCEDURE — C1751: CPT

## 2020-11-02 PROCEDURE — 85730 THROMBOPLASTIN TIME PARTIAL: CPT

## 2020-11-02 PROCEDURE — 83036 HEMOGLOBIN GLYCOSYLATED A1C: CPT

## 2020-11-02 PROCEDURE — 80048 BASIC METABOLIC PNL TOTAL CA: CPT

## 2020-11-02 PROCEDURE — 76770 US EXAM ABDO BACK WALL COMP: CPT

## 2020-11-02 PROCEDURE — 71250 CT THORAX DX C-: CPT

## 2020-11-02 PROCEDURE — 86769 SARS-COV-2 COVID-19 ANTIBODY: CPT

## 2020-11-02 RX ORDER — TAMSULOSIN HYDROCHLORIDE 0.4 MG/1
1 CAPSULE ORAL
Qty: 0 | Refills: 0 | DISCHARGE

## 2020-11-02 RX ORDER — FERROUS SULFATE 325(65) MG
1 TABLET ORAL
Qty: 30 | Refills: 0
Start: 2020-11-02 | End: 2020-12-01

## 2020-11-02 RX ORDER — TAMSULOSIN HYDROCHLORIDE 0.4 MG/1
2 CAPSULE ORAL
Qty: 60 | Refills: 0
Start: 2020-11-02 | End: 2020-12-01

## 2020-11-02 RX ORDER — ALBUTEROL 90 UG/1
1 AEROSOL, METERED ORAL
Qty: 1 | Refills: 0
Start: 2020-11-02 | End: 2020-12-01

## 2020-11-02 RX ORDER — ATORVASTATIN CALCIUM 80 MG/1
1 TABLET, FILM COATED ORAL
Qty: 0 | Refills: 0 | DISCHARGE
Start: 2020-11-02

## 2020-11-02 RX ORDER — ASCORBIC ACID 60 MG
1 TABLET,CHEWABLE ORAL
Qty: 0 | Refills: 0 | DISCHARGE
Start: 2020-11-02

## 2020-11-02 RX ORDER — MILK THISTLE 180 MG
250 CAPSULE ORAL
Qty: 0 | Refills: 0 | DISCHARGE

## 2020-11-02 RX ADMIN — Medication 81 MILLIGRAM(S): at 11:50

## 2020-11-02 RX ADMIN — CARVEDILOL PHOSPHATE 6.25 MILLIGRAM(S): 80 CAPSULE, EXTENDED RELEASE ORAL at 05:05

## 2020-11-02 RX ADMIN — ERTAPENEM SODIUM 120 MILLIGRAM(S): 1 INJECTION, POWDER, LYOPHILIZED, FOR SOLUTION INTRAMUSCULAR; INTRAVENOUS at 11:50

## 2020-11-02 RX ADMIN — CHLORHEXIDINE GLUCONATE 1 APPLICATION(S): 213 SOLUTION TOPICAL at 05:06

## 2020-11-02 RX ADMIN — Medication 3: at 12:57

## 2020-11-02 RX ADMIN — Medication 5 UNIT(S): at 08:28

## 2020-11-02 RX ADMIN — CILOSTAZOL 50 MILLIGRAM(S): 100 TABLET ORAL at 05:05

## 2020-11-02 RX ADMIN — LOSARTAN POTASSIUM 12.5 MILLIGRAM(S): 100 TABLET, FILM COATED ORAL at 05:05

## 2020-11-02 RX ADMIN — Medication 325 MILLIGRAM(S): at 11:50

## 2020-11-02 RX ADMIN — Medication 1: at 08:27

## 2020-11-02 RX ADMIN — ALBUTEROL 2.5 MILLIGRAM(S): 90 AEROSOL, METERED ORAL at 05:05

## 2020-11-02 RX ADMIN — Medication 500 MILLIGRAM(S): at 11:50

## 2020-11-02 RX ADMIN — HEPARIN SODIUM 5000 UNIT(S): 5000 INJECTION INTRAVENOUS; SUBCUTANEOUS at 12:56

## 2020-11-02 RX ADMIN — HEPARIN SODIUM 5000 UNIT(S): 5000 INJECTION INTRAVENOUS; SUBCUTANEOUS at 05:05

## 2020-11-02 RX ADMIN — Medication 5 UNIT(S): at 12:57

## 2020-11-02 RX ADMIN — ALBUTEROL 2.5 MILLIGRAM(S): 90 AEROSOL, METERED ORAL at 11:50

## 2020-11-02 NOTE — PROGRESS NOTE ADULT - REASON FOR ADMISSION
fever and urinary symptoms

## 2020-11-02 NOTE — PROGRESS NOTE ADULT - PROBLEM SELECTOR PLAN 9
- will continue Flomax
- will continue Flomax
- will continue Flomax  check PVR on bladder scan
Hep SC
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
normocytic, retic index<2  suspect iron deficiency  f/up as outpt for colonoscopy and eventual LAR .    for colovesicular fistula  c/w iron replacement with vitamin c

## 2020-11-02 NOTE — PROGRESS NOTE ADULT - PROBLEM SELECTOR PROBLEM 4
Acute renal failure, unspecified acute renal failure type
Pleural effusion

## 2020-11-02 NOTE — PROGRESS NOTE ADULT - PROBLEM SELECTOR PLAN 8
"    7/10/2018        RE: Betsy Resendiz  [address]  [place] [address]        [facility]    Chief Complaint   Patient presents with     Nursing Home Acute       HPI:    Betsy Resendiz is a 53 year old  (1965), who is being seen today for an episodic care visit at [facility] [place].    HPI information obtained from: facility chart records, facility staff and patient report. Today's concern is:  Dysuria  Patient states she has a hx of UTI's. Reports dysuria, frequency, urgency and a strong odor.  Had these symptoms x a week  Review of chart indicates similar c/o last week and UA/UC ordered  Conf with DON--urine not collected as when they were attempting to collect urine, patient fell in BR and was sent to urgent care because she fell on her affected arm and was in severe pain.  No urine was ultimately collected      REVIEW OF SYSTEMS:  4 point ROS including Respiratory, CV, GI and , other than that noted in the HPI,  is negative    /73  Pulse 76  Temp 97.8  F (36.6  C)  Resp 18  Ht 5' 4\" (1.626 m)  Wt 258 lb 6.4 oz (117.2 kg)  SpO2 98%  BMI 44.35 kg/m2  GENERAL APPEARANCE:  Alert, in no distress  Patient on toilet during interview.  Has urgency but currently unable to void.  Pain with palpation of lower abdomen    ASSESSMENT/PLAN:  Dysuria  Will re-order ua with conditional uc       Orders:  1.  UA with conditional UC.  Dysuria, abd pain    Electronically signed by:  DELL Lam CNP      Sincerely,        DELL Lam CNP    "
- Livalo non-formulary
- continue Flomax
- continue Flomax, increased to 0.8mg at bedtime
- will continue Flomax
- continue Flomax 0.8mg at bedtime

## 2020-11-02 NOTE — PROGRESS NOTE ADULT - ASSESSMENT
Assessment:  The patient is a 79 year old male with past medical history of hyperlipidemia, BPH, PVD, lung cancer s/p resection, hypertension, and type 2 diabetes mellitus sent in by PMD for refractory urinary symptoms and fever.  Patient reports he has been having burning pain upon urination for 1 month associated with slight nausea and "dribbling". He was found to have Klebsiella ESBL and E. coli in urine culture. Infectious disease was consulted for sepsis in the setting of urinary tract infection and colovesicular fistula.    Plan:  # Sepsis due to Klebsiella ESBL and E. coli in the setting of colovesicular fistula  - Discontinued intravenous piperacillin-tazobactam and switched  to intravenous ertapenem  - s/p PICC   - to complete ab course at home with PICC  -will need to follow up with urology and surgery/GI  -pt at high risk for recurrent infection if fistula is not addressed asap  - no oral options to cover patients pathogens

## 2020-11-02 NOTE — PROGRESS NOTE ADULT - PROBLEM SELECTOR PLAN 6
- will hold Losartan for today and restart tomorrow if renal function improves
- will hold oral agent  - will treat with Lantus and ISS  - A1C 8.2
- will hold oral agent  - will treat with Lantus and ISS  - A1C 8.2
- will hold oral agent  - will treat with Lantus and ISS.  Uncontrolled.  Start mealtime insulin and reduce basal to 5/5/5 lispro with meals and 20 lantus qhs (from 30 lantus qhs)  - A1C 8.2
cont home losartan; at goal

## 2020-11-02 NOTE — PROGRESS NOTE ADULT - PROBLEM SELECTOR PROBLEM 3
Acute renal failure, unspecified acute renal failure type
Acute renal failure, unspecified acute renal failure type
Colovesical fistula
Acute renal failure, unspecified acute renal failure type

## 2020-11-02 NOTE — PROGRESS NOTE ADULT - PROBLEM SELECTOR PROBLEM 2
Sepsis with other acute renal failure without septic shock

## 2020-11-02 NOTE — PROGRESS NOTE ADULT - PROBLEM SELECTOR PROBLEM 9
Benign prostatic hyperplasia with post-void dribbling
Iron deficiency anemia, unspecified iron deficiency anemia type
Iron deficiency anemia, unspecified iron deficiency anemia type
Prophylactic measure
Iron deficiency anemia, unspecified iron deficiency anemia type

## 2020-11-02 NOTE — DISCHARGE NOTE NURSING/CASE MANAGEMENT/SOCIAL WORK - NSSCTYPOFSERV_GEN_ALL_CORE
Start of Care 11/3. Will provide the antibiotics & supplies needed to administer IV antibiotics. A nurse will provide education on how to administer the antibiotic & PICC line care. A nurse will call to make an appointment with you.

## 2020-11-02 NOTE — PROGRESS NOTE ADULT - SUBJECTIVE AND OBJECTIVE BOX
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    H/O colectomy  Partial      Allergies    vancomycin (Nephrotoxicity)    Intolerances      Antimicrobials:    ertapenem  IVPB      ertapenem  IVPB 1000 milliGRAM(s) IV Intermittent every 24 hours    MEDICATIONS  (STANDING):  ALBUTerol    0.083% 2.5 milliGRAM(s) Nebulizer every 6 hours  ALBUTerol    90 MICROgram(s) HFA Inhaler 1 Puff(s) Inhalation every 4 hours  ascorbic acid 500 milliGRAM(s) Oral daily  aspirin enteric coated 81 milliGRAM(s) Oral daily  atorvastatin 20 milliGRAM(s) Oral at bedtime  carvedilol 6.25 milliGRAM(s) Oral every 12 hours  chlorhexidine 4% Liquid 1 Application(s) Topical <User Schedule>  cilostazol 50 milliGRAM(s) Oral two times a day  dextrose 5%. 1000 milliLiter(s) (50 mL/Hr) IV Continuous <Continuous>  dextrose 50% Injectable 12.5 Gram(s) IV Push once  dextrose 50% Injectable 25 Gram(s) IV Push once  dextrose 50% Injectable 25 Gram(s) IV Push once  ertapenem  IVPB      ertapenem  IVPB 1000 milliGRAM(s) IV Intermittent every 24 hours  ferrous    sulfate 325 milliGRAM(s) Oral daily  heparin   Injectable 5000 Unit(s) SubCutaneous every 8 hours  insulin glargine Injectable (LANTUS) 20 Unit(s) SubCutaneous at bedtime  insulin lispro (ADMELOG) corrective regimen sliding scale   SubCutaneous three times a day before meals  insulin lispro (ADMELOG) corrective regimen sliding scale   SubCutaneous at bedtime  insulin lispro Injectable (ADMELOG) 5 Unit(s) SubCutaneous three times a day before meals  losartan 12.5 milliGRAM(s) Oral daily  tamsulosin 0.8 milliGRAM(s) Oral at bedtime      Vital Signs Last 24 Hrs  T(C): 36.8 (11-02-20 @ 14:43), Max: 36.9 (11-02-20 @ 04:59)  T(F): 98.2 (11-02-20 @ 14:43), Max: 98.4 (11-02-20 @ 04:59)  HR: 74 (11-02-20 @ 14:43) (74 - 81)  BP: 160/70 (11-02-20 @ 14:43) (146/80 - 160/70)  BP(mean): --  RR: 18 (11-02-20 @ 14:43) (18 - 18)  SpO2: 98% (11-02-20 @ 14:43) (96% - 98%)    Physical Exam:    Constitutional well preserved,comfortable,pleasant    HEENT PERRLA EOMI,No pallor or icterus    No oral exudate or erythema    Neck supple no JVD or LN    Chest Good AE,CTA    CVS RRR S1 S2 WNl No murmur or rub or gallop    Abd soft BS normal No tendernes  Ext No cyanosis clubbing or edema    IV site no erythema tenderness or discharge    Joints no swelling or LOM    CNS AAO X 3 no focal    Lab Data:                          10.1   5.94  )-----------( 229      ( 02 Nov 2020 05:33 )             31.1       11-01    141  |  106  |  21  ----------------------------<  96  4.0   |  24  |  1.18    Ca    9.2      01 Nov 2020 07:14            .Urine Clean Catch (Midstream)  10-27-20   >100,000 CFU/ml Klebsiella pneumoniae ESBL  >100,000 CFU/ml Escherichia coli  --  Klebsiella pneumoniae ESBL  Escherichia coli      .Blood Blood-Peripheral  10-27-20   No Growth Final  --  --      .Blood Blood-Peripheral  10-27-20   No Growth Final  --  --                    WBC Count: 5.94 (11-02-20 @ 05:33)  WBC Count: 5.61 (11-01-20 @ 07:15)  WBC Count: 5.48 (10-31-20 @ 06:44)  WBC Count: 5.24 (10-30-20 @ 06:38)  WBC Count: 5.58 (10-29-20 @ 07:07)  WBC Count: 8.79 (10-28-20 @ 07:24)  WBC Count: 13.97 (10-27-20 @ 06:56)  WBC Count: 21.17 (10-26-20 @ 23:00)             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    H/O colectomy  Partial      Allergies    vancomycin (Nephrotoxicity)    Intolerances      Antimicrobials:    ertapenem  IVPB      ertapenem  IVPB 1000 milliGRAM(s) IV Intermittent every 24 hours    MEDICATIONS  (STANDING):  ALBUTerol    0.083% 2.5 milliGRAM(s) Nebulizer every 6 hours  ALBUTerol    90 MICROgram(s) HFA Inhaler 1 Puff(s) Inhalation every 4 hours  ascorbic acid 500 milliGRAM(s) Oral daily  aspirin enteric coated 81 milliGRAM(s) Oral daily  atorvastatin 20 milliGRAM(s) Oral at bedtime  carvedilol 6.25 milliGRAM(s) Oral every 12 hours  chlorhexidine 4% Liquid 1 Application(s) Topical <User Schedule>  cilostazol 50 milliGRAM(s) Oral two times a day  dextrose 5%. 1000 milliLiter(s) (50 mL/Hr) IV Continuous <Continuous>  dextrose 50% Injectable 12.5 Gram(s) IV Push once  dextrose 50% Injectable 25 Gram(s) IV Push once  dextrose 50% Injectable 25 Gram(s) IV Push once  ertapenem  IVPB      ertapenem  IVPB 1000 milliGRAM(s) IV Intermittent every 24 hours  ferrous    sulfate 325 milliGRAM(s) Oral daily  heparin   Injectable 5000 Unit(s) SubCutaneous every 8 hours  insulin glargine Injectable (LANTUS) 20 Unit(s) SubCutaneous at bedtime  insulin lispro (ADMELOG) corrective regimen sliding scale   SubCutaneous three times a day before meals  insulin lispro (ADMELOG) corrective regimen sliding scale   SubCutaneous at bedtime  insulin lispro Injectable (ADMELOG) 5 Unit(s) SubCutaneous three times a day before meals  losartan 12.5 milliGRAM(s) Oral daily  tamsulosin 0.8 milliGRAM(s) Oral at bedtime      Vital Signs Last 24 Hrs  T(C): 36.8 (11-02-20 @ 14:43), Max: 36.9 (11-02-20 @ 04:59)  T(F): 98.2 (11-02-20 @ 14:43), Max: 98.4 (11-02-20 @ 04:59)  HR: 74 (11-02-20 @ 14:43) (74 - 81)  BP: 160/70 (11-02-20 @ 14:43) (146/80 - 160/70)  BP(mean): --  RR: 18 (11-02-20 @ 14:43) (18 - 18)  SpO2: 98% (11-02-20 @ 14:43) (96% - 98%)    Physical Exam:    Constitutional well preserved,comfortable,pleasant    HEENT PERRLA EOMI,No pallor or icterus    No oral exudate or erythema    Neck supple no JVD or LN    Chest Good AE,CTA    CVS RRR S1 S2     Abd soft BS normal No tendernes  Ext No cyanosis clubbing or edema    PICCsite no erythema tenderness or discharge    Joints no swelling or LOM    CNS AAO X 3 no focal    Lab Data:                          10.1   5.94  )-----------( 229      ( 02 Nov 2020 05:33 )             31.1       11-01    141  |  106  |  21  ----------------------------<  96  4.0   |  24  |  1.18    Ca    9.2      01 Nov 2020 07:14            .Urine Clean Catch (Midstream)  10-27-20   >100,000 CFU/ml Klebsiella pneumoniae ESBL  >100,000 CFU/ml Escherichia coli  --  Klebsiella pneumoniae ESBL  Escherichia coli      .Blood Blood-Peripheral  10-27-20   No Growth Final  --  --      .Blood Blood-Peripheral  10-27-20   No Growth Final  --  --                    WBC Count: 5.94 (11-02-20 @ 05:33)  WBC Count: 5.61 (11-01-20 @ 07:15)  WBC Count: 5.48 (10-31-20 @ 06:44)  WBC Count: 5.24 (10-30-20 @ 06:38)  WBC Count: 5.58 (10-29-20 @ 07:07)  WBC Count: 8.79 (10-28-20 @ 07:24)  WBC Count: 13.97 (10-27-20 @ 06:56)  WBC Count: 21.17 (10-26-20 @ 23:00)

## 2020-11-02 NOTE — PROGRESS NOTE ADULT - PROBLEM SELECTOR PLAN 4
hold arb/diuretics  baseline ckd 3 with creatinine 1.3-1.5  avoid nephrotoxic meds
hold arb/diuretics  baseline ckd 3 with creatinine 1.3-1.5  avoid nephrotoxic meds
improving with ivf.  hold arb/diuretics
improving with ivf.  hold arb/diuretics  baseline ckd 3 with creatinine 1.3-1.5
not hypoxic and no respiratory distress; chronic  outpatient followup

## 2020-11-02 NOTE — PROGRESS NOTE ADULT - PROBLEM SELECTOR PLAN 2
- will treat with IVF and Abx  - will monitor renal function with IVF, GILMA in the setting of sepsis
resolved, wbc downtrending and afebrile on Abx
resolved, wbc downtrending and afebrile on Abx
resolving, wbc downtrending and afebrile on Abx  care per problem 1
resolved, wbc downtrending and afebrile on Abx

## 2020-11-02 NOTE — PROGRESS NOTE ADULT - PROBLEM SELECTOR PROBLEM 6
Essential hypertension
Type 2 diabetes mellitus with stage 3 chronic kidney disease, with long-term current use of insulin, unspecified whether stage 3a or 3b CKD
Essential hypertension

## 2020-11-02 NOTE — PROGRESS NOTE ADULT - PROBLEM SELECTOR PLAN 3
baseline ckd 3 with creatinine 1.3-1.5; sCr stable  avoid nephrotoxic meds
baseline ckd 3 with creatinine 1.3-1.5; sCr stable  avoid nephrotoxic meds
noted on ultrasound, urology  and surgery c/s placed  per colorectal surgery: No urgent surgical intervention warranted, patient hemodynamically stable, afebrile, clinically improving on antibiotics  - Patient may follow-up with Dr. Betts in the office, and will need colonoscopy prior to operative intervention, to be scheduled with Dr. Betts
noted on ultrasound, urology c/s placed
noted on ultrasound, urology c/s placed  per urology, may remove kaye  re-consult surgery once ct imaging resulted
noted on ultrasound, urology c/s placed.  patient now refusing kaye for cystogram to further evaluate.  Following up with radiology regarding imaging options  I spoke with surgery yesterday-will assess patient after further imaging obtained
baseline ckd 3 with creatinine 1.3-1.5; sCr stable  avoid nephrotoxic meds

## 2020-11-02 NOTE — PROGRESS NOTE ADULT - PROBLEM SELECTOR PROBLEM 7
Labwork ordered per verbal from md  
Essential hypertension
Hypercholesteremia

## 2020-11-02 NOTE — DISCHARGE NOTE NURSING/CASE MANAGEMENT/SOCIAL WORK - PATIENT PORTAL LINK FT
You can access the FollowMyHealth Patient Portal offered by [facility] by registering at the following website: [email or web address]. By joining [facility]’s FollowMyHealth portal, you will also be able to view your health information using other applications (apps) compatible with our system.

## 2020-11-02 NOTE — PROGRESS NOTE ADULT - PROBLEM SELECTOR PLAN 5
- will hold oral agent  - will treat with Lantus and ISS  - will check A1C
- will hold oral agent while inpatient  - A1C 8.2  - cont current insulin regimen  - low dose correction scale
- will hold oral agent while inpatient  - A1C 8.2  - cont current insulin regimen  - low dose correction scale
not hypoxic and no respiratory distress  appreciate pulm consult:  prior collateral showing CT from 1/2020 with loculated right sided effusion; pt without respiratory distress or hypoxemia; doubt effusion is cause of cough as chronicity of effusion is months whereas cough is 3weeks  I spoke with Dr Ansari, suggests trial of albuterol nebs
not hypoxic and no respiratory distress  non urgent pulm consult placed for tomorrow to eval for diagnostic tap
not hypoxic and no respiratory distress  pulm consult appreciated: plan for diagnostic thoracentesis tomorrow
-  oral agent held while inpatient  - A1C 8.2  - cont current insulin regimen  - low dose correction scale

## 2020-11-02 NOTE — PROGRESS NOTE ADULT - PROBLEM SELECTOR PROBLEM 5
Pleural effusion
Type 2 diabetes mellitus with stage 3 chronic kidney disease, with long-term current use of insulin, unspecified whether stage 3a or 3b CKD

## 2020-11-02 NOTE — PROGRESS NOTE ADULT - SUBJECTIVE AND OBJECTIVE BOX
Patient is a 79y old  Male who presents with a chief complaint of fever and urinary symptoms (01 Nov 2020 15:32)      SUBJECTIVE / OVERNIGHT EVENTS:    MEDICATIONS  (STANDING):  ALBUTerol    0.083% 2.5 milliGRAM(s) Nebulizer every 6 hours  ALBUTerol    90 MICROgram(s) HFA Inhaler 1 Puff(s) Inhalation every 4 hours  ascorbic acid 500 milliGRAM(s) Oral daily  aspirin enteric coated 81 milliGRAM(s) Oral daily  atorvastatin 20 milliGRAM(s) Oral at bedtime  carvedilol 6.25 milliGRAM(s) Oral every 12 hours  chlorhexidine 4% Liquid 1 Application(s) Topical <User Schedule>  cilostazol 50 milliGRAM(s) Oral two times a day  dextrose 5%. 1000 milliLiter(s) (50 mL/Hr) IV Continuous <Continuous>  dextrose 50% Injectable 12.5 Gram(s) IV Push once  dextrose 50% Injectable 25 Gram(s) IV Push once  dextrose 50% Injectable 25 Gram(s) IV Push once  ertapenem  IVPB      ertapenem  IVPB 1000 milliGRAM(s) IV Intermittent every 24 hours  ferrous    sulfate 325 milliGRAM(s) Oral daily  heparin   Injectable 5000 Unit(s) SubCutaneous every 8 hours  insulin glargine Injectable (LANTUS) 20 Unit(s) SubCutaneous at bedtime  insulin lispro (ADMELOG) corrective regimen sliding scale   SubCutaneous three times a day before meals  insulin lispro (ADMELOG) corrective regimen sliding scale   SubCutaneous at bedtime  insulin lispro Injectable (ADMELOG) 5 Unit(s) SubCutaneous three times a day before meals  losartan 12.5 milliGRAM(s) Oral daily  tamsulosin 0.8 milliGRAM(s) Oral at bedtime    MEDICATIONS  (PRN):  dextrose 40% Gel 15 Gram(s) Oral once PRN Blood Glucose LESS THAN 70 milliGRAM(s)/deciliter  glucagon  Injectable 1 milliGRAM(s) IntraMuscular once PRN Glucose LESS THAN 70 milligrams/deciliter  sodium chloride 0.9% lock flush 10 milliLiter(s) IV Push every 1 hour PRN Pre/post blood products, medications, blood draw, and to maintain line patency      Vital Signs Last 24 Hrs  T(C): 36.9 (02 Nov 2020 04:59), Max: 36.9 (02 Nov 2020 04:59)  T(F): 98.4 (02 Nov 2020 04:59), Max: 98.4 (02 Nov 2020 04:59)  HR: 81 (02 Nov 2020 04:59) (59 - 81)  BP: 146/80 (02 Nov 2020 04:59) (105/53 - 151/67)  BP(mean): --  RR: 18 (02 Nov 2020 04:59) (18 - 18)  SpO2: 97% (02 Nov 2020 04:59) (95% - 97%)  CAPILLARY BLOOD GLUCOSE      POCT Blood Glucose.: 158 mg/dL (02 Nov 2020 08:09)  POCT Blood Glucose.: 203 mg/dL (01 Nov 2020 21:53)  POCT Blood Glucose.: 196 mg/dL (01 Nov 2020 17:14)    I&O's Summary    01 Nov 2020 07:01  -  02 Nov 2020 07:00  --------------------------------------------------------  IN: 1730 mL / OUT: 1850 mL / NET: -120 mL    02 Nov 2020 07:01  -  02 Nov 2020 12:08  --------------------------------------------------------  IN: 240 mL / OUT: 200 mL / NET: 40 mL        PHYSICAL EXAM:  GENERAL: NAD, well-developed  HEAD:  Atraumatic, Normocephalic  EYES: EOMI, PERRLA, conjunctiva and sclera clear  NECK: Supple, No JVD  CHEST/LUNG: Clear to auscultation bilaterally; No wheeze  HEART: Regular rate and rhythm; No murmurs, rubs, or gallops  ABDOMEN: Soft, Nontender, Nondistended; Bowel sounds present  EXTREMITIES:  2+ Peripheral Pulses, No clubbing, cyanosis, or edema  PSYCH: AAOx3  NEUROLOGY: non-focal  SKIN: No rashes or lesions    LABS:                        10.1   5.94  )-----------( 229      ( 02 Nov 2020 05:33 )             31.1     11-01    141  |  106  |  21  ----------------------------<  96  4.0   |  24  |  1.18    Ca    9.2      01 Nov 2020 07:14      PT/INR - ( 01 Nov 2020 09:03 )   PT: 12.2 sec;   INR: 1.04 ratio         PTT - ( 01 Nov 2020 09:03 )  PTT:30.3 sec          RADIOLOGY & ADDITIONAL TESTS:    Imaging Personally Reviewed:    Consultant(s) Notes Reviewed:      Care Discussed with Consultants/Other Providers:   Patient is a 79y old  Male who presents with a chief complaint of fever and urinary symptoms (01 Nov 2020 15:32)      SUBJECTIVE / OVERNIGHT EVENTS:  Pt seen and examined. No acute events overnight. He denies fever/chills, abd pain, dysuria, hematuria.    MEDICATIONS  (STANDING):  ALBUTerol    0.083% 2.5 milliGRAM(s) Nebulizer every 6 hours  ALBUTerol    90 MICROgram(s) HFA Inhaler 1 Puff(s) Inhalation every 4 hours  ascorbic acid 500 milliGRAM(s) Oral daily  aspirin enteric coated 81 milliGRAM(s) Oral daily  atorvastatin 20 milliGRAM(s) Oral at bedtime  carvedilol 6.25 milliGRAM(s) Oral every 12 hours  chlorhexidine 4% Liquid 1 Application(s) Topical <User Schedule>  cilostazol 50 milliGRAM(s) Oral two times a day  dextrose 5%. 1000 milliLiter(s) (50 mL/Hr) IV Continuous <Continuous>  dextrose 50% Injectable 12.5 Gram(s) IV Push once  dextrose 50% Injectable 25 Gram(s) IV Push once  dextrose 50% Injectable 25 Gram(s) IV Push once  ertapenem  IVPB      ertapenem  IVPB 1000 milliGRAM(s) IV Intermittent every 24 hours  ferrous    sulfate 325 milliGRAM(s) Oral daily  heparin   Injectable 5000 Unit(s) SubCutaneous every 8 hours  insulin glargine Injectable (LANTUS) 20 Unit(s) SubCutaneous at bedtime  insulin lispro (ADMELOG) corrective regimen sliding scale   SubCutaneous three times a day before meals  insulin lispro (ADMELOG) corrective regimen sliding scale   SubCutaneous at bedtime  insulin lispro Injectable (ADMELOG) 5 Unit(s) SubCutaneous three times a day before meals  losartan 12.5 milliGRAM(s) Oral daily  tamsulosin 0.8 milliGRAM(s) Oral at bedtime    MEDICATIONS  (PRN):  dextrose 40% Gel 15 Gram(s) Oral once PRN Blood Glucose LESS THAN 70 milliGRAM(s)/deciliter  glucagon  Injectable 1 milliGRAM(s) IntraMuscular once PRN Glucose LESS THAN 70 milligrams/deciliter  sodium chloride 0.9% lock flush 10 milliLiter(s) IV Push every 1 hour PRN Pre/post blood products, medications, blood draw, and to maintain line patency      Vital Signs Last 24 Hrs  T(C): 36.9 (02 Nov 2020 04:59), Max: 36.9 (02 Nov 2020 04:59)  T(F): 98.4 (02 Nov 2020 04:59), Max: 98.4 (02 Nov 2020 04:59)  HR: 81 (02 Nov 2020 04:59) (59 - 81)  BP: 146/80 (02 Nov 2020 04:59) (105/53 - 151/67)  BP(mean): --  RR: 18 (02 Nov 2020 04:59) (18 - 18)  SpO2: 97% (02 Nov 2020 04:59) (95% - 97%)  CAPILLARY BLOOD GLUCOSE      POCT Blood Glucose.: 158 mg/dL (02 Nov 2020 08:09)  POCT Blood Glucose.: 203 mg/dL (01 Nov 2020 21:53)  POCT Blood Glucose.: 196 mg/dL (01 Nov 2020 17:14)    I&O's Summary    01 Nov 2020 07:01  -  02 Nov 2020 07:00  --------------------------------------------------------  IN: 1730 mL / OUT: 1850 mL / NET: -120 mL    02 Nov 2020 07:01  -  02 Nov 2020 12:08  --------------------------------------------------------  IN: 240 mL / OUT: 200 mL / NET: 40 mL        PHYSICAL EXAM:  GENERAL: NAD, anicteric, afebrile  EYES: conjunctiva and sclera clear  ENMT: Moist oral mucosa, no pharyngeal erythema  NECK: Supple  RESPIRATORY: Normal respiratory effort; lungs are clear to auscultation bilaterally, no wheeze/crackles  CARDIOVASCULAR: Regular rate and rhythm, normal S1 and S2, no murmur  ABDOMEN: Nontender to palpation, normoactive bowel sounds, no rebound/guarding  PSYCH: affect appropriate  NEUROLOGY: AAOX 3, normal strength b/l LE, no focal deficits  EXTREMITIES: right UE PICC line in place; No pedal edema, no clubbing      LABS:                        10.1   5.94  )-----------( 229      ( 02 Nov 2020 05:33 )             31.1     11-01    141  |  106  |  21  ----------------------------<  96  4.0   |  24  |  1.18    Ca    9.2      01 Nov 2020 07:14      PT/INR - ( 01 Nov 2020 09:03 )   PT: 12.2 sec;   INR: 1.04 ratio         PTT - ( 01 Nov 2020 09:03 )  PTT:30.3 sec

## 2020-11-02 NOTE — PROGRESS NOTE ADULT - PROBLEM SELECTOR PROBLEM 8
Benign prostatic hyperplasia with post-void dribbling
Hypercholesteremia
Benign prostatic hyperplasia with post-void dribbling

## 2020-11-02 NOTE — PROGRESS NOTE ADULT - PROBLEM SELECTOR PLAN 7
-  hold Losartan for sepsis/sofie
-  hold Losartan for sepsis/sofie
- Livalo non-formulary
resume home losartan as sepsis has resolved
- Livalo non-formulary

## 2020-11-02 NOTE — PROGRESS NOTE ADULT - ATTENDING COMMENTS
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 pulmonary consulted for cough in the setting of right pleural effusion concerning for empyema vs. malignant effusion vs. post-surgical changes.    After review of the CT scan and previous records it appears that the pleural effusion is chronic over the last 10 months. This makes this effusion unlikely to be an infection or acute malignancy. Given chronicity and the fact that the patient has other infectious problems at this time, will differ thoracentesis as this time. He can follow up with his CT surgeon for further evaluation of the recent CT scan cancer screening.     Patient would benefit from an outpatient follow up with PFTs. He can follow up with us if he would like.    You can make an appointment by emailing [email or web address]  [phone]    Thank you for your consult. Please call back if you have further questions regarding the care of this patient.
Rose Loaiza M.D. ,   Pager [phone]     after 5PM/ weekends [phone]
DISPO: pending PICC line placement and outpatient IV antibiotic and home care setup
Lovenox for DVT ppx  DISPO:pending PICC placement and outpatient IV abx orders
discussed above with wife and patient at bedside

## 2020-11-02 NOTE — PROGRESS NOTE ADULT - PROBLEM SELECTOR PLAN 1
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  Patient may follow-up with Dr. Betts in the office, and will need colonoscopy prior to operative intervention  ID recs appreciated: on ertapenem   PICC line ordered pending discharge
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  Patient may follow-up with Dr. Betts in the office, and will need colonoscopy prior to operative intervention  ID recs appreciated: switch to meropenem  PICC line ordered pending discharge
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  Sensitivities back for kleb/ecoli.  Kleb without oral options.    blood culture no growth to date  appreciate surgery recs.  Plan for outpatient f/u first for c-scope.  ID consult placed to assist with outpatient abx choice/duration as source control will not be established inpatient
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   Patient to follow-up with Dr. Betts in the office, and will need colonoscopy prior to operative intervention  ID recs appreciated: on Ertapenem to complete 10 days  PICC line in place   Pt is clinically stable for discharge to follow up with Colorectal Sx ( Dr Betts)  f/up as outpt for colonoscopy and eventual LAR .

## 2020-11-03 PROBLEM — N40.0 BENIGN PROSTATIC HYPERPLASIA WITHOUT LOWER URINARY TRACT SYMPTOMS: Chronic | Status: ACTIVE | Noted: 2020-10-27

## 2020-11-12 ENCOUNTER — APPOINTMENT (OUTPATIENT)
Dept: UROLOGY | Facility: CLINIC | Age: 80
End: 2020-11-12
Payer: MEDICARE

## 2020-11-12 ENCOUNTER — APPOINTMENT (OUTPATIENT)
Dept: COLORECTAL SURGERY | Facility: CLINIC | Age: 80
End: 2020-11-12
Payer: MEDICARE

## 2020-11-12 VITALS — TEMPERATURE: 97.3 F

## 2020-11-12 VITALS — TEMPERATURE: 97.8 F

## 2020-11-12 PROCEDURE — 99214 OFFICE O/P EST MOD 30 MIN: CPT

## 2020-11-12 RX ORDER — METRONIDAZOLE 500 MG/1
500 TABLET ORAL
Qty: 3 | Refills: 0 | Status: ACTIVE | COMMUNITY
Start: 2020-11-12 | End: 1900-01-01

## 2020-11-12 RX ORDER — NEOMYCIN SULFATE 500 MG/1
500 TABLET ORAL
Qty: 3 | Refills: 0 | Status: ACTIVE | COMMUNITY
Start: 2020-11-12 | End: 1900-01-01

## 2020-11-12 NOTE — ADDENDUM
[FreeTextEntry1] : Entered by Yuri Flores, acting as scribe for Dr. Juvenal Broussard.\par \par The documentation recorded by the scribe accurately reflects the service I personally performed and the decisions made by me.\par

## 2020-11-12 NOTE — ASSESSMENT
[FreeTextEntry1] : The patient is very anxious at the though of another colon surgery and the potential for another ostomy. I reassured him that electively there is a very low likelihood of requiring an ostomy, although the risk is not zero. He will need a screening colonoscopy as his last was 5 years ago. R/b/a d/w him including but not limited to bleeding, perforation and missed lesions. \par He is then offered a robotic/laparoscopic, possible open LAR with intraop ureteral catheters. R/b/a d/w him and his wife including but not limited to pain, bleeding, infection, anastomotic leak, ileus, wound complications, MI, stroke, DVT/PE, pulmonary complications, and death. \par He is advised that there is a possibility that he will have even more frequent BMs after surgery with less colon. He is also advised of the option to forego surgery and just treat him with antibiotics for recurrent UTIs\par He understands and wishes to proceed with surgery. He will require medical and cardiac clearance preop\par \par The following has been addressed with the patient during their preop visit. \par \par o	Antibiotics ordered\par o	Fasting times reviewed\par o	Bowel prep instructions given\par o	Oral carbohydrate given\par o	Goals for nutrition intake/ diet information given\par o	Pain management information given\par o	Discharge criteria & expected hospital stay\par \par

## 2020-11-12 NOTE — LETTER BODY
[FreeTextEntry1] : Joon Smith MD\par [address]\[place] [address]\par [phone]\par \par Dear Dr. Smith,\par \par Reason for Visit: Colovesical fistula.\par \par This is a 79 year-old gentleman with colovesical fistula. Patient is referred for evaluation of his condition. He is accompanied by his wife. Patient denies any gross hematuria or dysuria or urinary incontinence. He has tried antibiotics to treat his condition previously. The patient denies any aggravating or relieving factors. The patient denies any interference of function. He reports that he is scheduled for LAR procedure with Dr. Zurita. The patient is entirely asymptomatic. All other review of systems are negative. He has no cancer in his family medical history. He has no previous surgical history. Past medical history, family history and social history were inquired and were noncontributory to current condition. The patient does not use tobacco or drink alcohol. Medications and allergies were reviewed. He has no known allergies to medication. \par \par On examination, the patient is an elderly-appearing gentleman in no acute distress. He is alert and oriented follows commands. He has normal mood and affect. He is normocephalic. Oral no thrush. Neck is supple. Respirations are unlabored. His abdomen is soft and nontender. Liver is nonpalpable. Bladder is nonpalpable. No CVA tenderness. Neurologically he is grossly intact. No peripheral edema. Skin without gross abnormality. He has normal male external genitalia. Normal meatus. Bilateral testes are descended intrascrotally and normal to palpation. On rectal examination, there is normal sphincter tone. The prostate is clinically benign without focal induration or nodularity.\par \par Assessment: Colovesical fistula.\par \par I counseled the patient. I discussed the various etiologies of his symptoms. I recommended the patient proceed with LAR procedure with Dr. Zurita. Risks and alternatives were discussed. I answered the patient questions. The patient will follow-up as directed and will contact me with any questions or concerns. Thank you for the opportunity to participate in the care of this patient. I'll keep you updated on his progress.\par \par Plan: Proceed with LAR with Dr. Zurita.

## 2020-11-12 NOTE — CONSULT LETTER
[Dear  ___] : Dear  [unfilled], [Consult Letter:] : I had the pleasure of evaluating your patient, [unfilled]. [Please see my note below.] : Please see my note below. [Consult Closing:] : Thank you very much for allowing me to participate in the care of this patient.  If you have any questions, please do not hesitate to contact me. [Sincerely,] : Sincerely, [FreeTextEntry3] : Mac Betts MD, FACS, FASCRS\par Colorectal Surgery\par [facility]\par Assistant Professor of Surgery [employer] [facility]\par [address] [place] [address], [address]\[place] [address]\par Tel: [phone] \par Cell: [phone] \par Email: [email or web address]\par

## 2020-11-12 NOTE — HISTORY OF PRESENT ILLNESS
[FreeTextEntry1] : The patient presents after a recent admission for diverticular colovesicle fistula. He was discharged home on IV abx via PICC that finished 2 days ago and the PICC was removed yesterday. The patient feels well without any abd pain. He continues to have particulate matter in his urine. He is eating well without n/v. He has no fevers. He does report more frequent and looser BMs since his hospitalization but has always had about 3 BMs per day prior to this. He had a traumatic right hemicolectomy with ileostomy and subsequent reversal many years ago. \par Last colonoscopy 5 years ago Dr. Choudhury

## 2020-11-12 NOTE — PHYSICAL EXAM
[Exam Deferred] : exam was deferred [Normal Breath Sounds] : Normal breath sounds [Normal Heart Sounds] : normal heart sounds [Normal Rate and Rhythm] : normal rate and rhythm [Alert] : alert [Oriented to Person] : oriented to person [Oriented to Place] : oriented to place [Oriented to Time] : oriented to time [Calm] : calm [Wheezing] : no wheezing was heard [de-identified] : soft, NT/ND, well healed incisions from previous surgery, + reducible umbilical hernia [de-identified] : NAD [de-identified] : NCAT [de-identified] : supple [de-identified] : normal ROM [de-identified] : warm

## 2020-11-27 ENCOUNTER — APPOINTMENT (OUTPATIENT)
Dept: DISASTER EMERGENCY | Facility: CLINIC | Age: 80
End: 2020-11-27

## 2020-11-27 DIAGNOSIS — Z01.818 ENCOUNTER FOR OTHER PREPROCEDURAL EXAMINATION: ICD-10-CM

## 2020-11-28 ENCOUNTER — NON-APPOINTMENT (OUTPATIENT)
Age: 80
End: 2020-11-28

## 2020-11-28 LAB — SARS-COV-2 N GENE NPH QL NAA+PROBE: NOT DETECTED

## 2020-11-30 ENCOUNTER — OUTPATIENT (OUTPATIENT)
Dept: OUTPATIENT SERVICES | Facility: HOSPITAL | Age: 80
LOS: 1 days | End: 2020-11-30

## 2020-11-30 ENCOUNTER — APPOINTMENT (OUTPATIENT)
Dept: COLORECTAL SURGERY | Facility: HOSPITAL | Age: 80
End: 2020-11-30
Payer: MEDICARE

## 2020-11-30 VITALS
HEART RATE: 81 BPM | RESPIRATION RATE: 20 BRPM | OXYGEN SATURATION: 99 % | DIASTOLIC BLOOD PRESSURE: 55 MMHG | SYSTOLIC BLOOD PRESSURE: 107 MMHG

## 2020-11-30 VITALS
HEART RATE: 98 BPM | DIASTOLIC BLOOD PRESSURE: 79 MMHG | OXYGEN SATURATION: 98 % | WEIGHT: 160.06 LBS | TEMPERATURE: 97 F | RESPIRATION RATE: 22 BRPM | HEIGHT: 70 IN | SYSTOLIC BLOOD PRESSURE: 146 MMHG

## 2020-11-30 VITALS
HEART RATE: 95 BPM | OXYGEN SATURATION: 96 % | WEIGHT: 162.04 LBS | SYSTOLIC BLOOD PRESSURE: 140 MMHG | HEIGHT: 70 IN | DIASTOLIC BLOOD PRESSURE: 71 MMHG | TEMPERATURE: 98 F | RESPIRATION RATE: 17 BRPM

## 2020-11-30 DIAGNOSIS — K57.32 DIVERTICULITIS OF LARGE INTESTINE WITHOUT PERFORATION OR ABSCESS WITHOUT BLEEDING: ICD-10-CM

## 2020-11-30 DIAGNOSIS — Z98.890 OTHER SPECIFIED POSTPROCEDURAL STATES: Chronic | ICD-10-CM

## 2020-11-30 DIAGNOSIS — Z90.49 ACQUIRED ABSENCE OF OTHER SPECIFIED PARTS OF DIGESTIVE TRACT: Chronic | ICD-10-CM

## 2020-11-30 DIAGNOSIS — Z90.2 ACQUIRED ABSENCE OF LUNG [PART OF]: Chronic | ICD-10-CM

## 2020-11-30 DIAGNOSIS — S32.9XXA FRACTURE OF UNSPECIFIED PARTS OF LUMBOSACRAL SPINE AND PELVIS, INITIAL ENCOUNTER FOR CLOSED FRACTURE: Chronic | ICD-10-CM

## 2020-11-30 DIAGNOSIS — Z01.818 ENCOUNTER FOR OTHER PREPROCEDURAL EXAMINATION: ICD-10-CM

## 2020-11-30 DIAGNOSIS — Z98.49 CATARACT EXTRACTION STATUS, UNSPECIFIED EYE: Chronic | ICD-10-CM

## 2020-11-30 DIAGNOSIS — E11.9 TYPE 2 DIABETES MELLITUS WITHOUT COMPLICATIONS: ICD-10-CM

## 2020-11-30 DIAGNOSIS — K57.92 DIVERTICULITIS OF INTESTINE, PART UNSPECIFIED, WITHOUT PERFORATION OR ABSCESS WITHOUT BLEEDING: ICD-10-CM

## 2020-11-30 LAB — GLUCOSE BLDC GLUCOMTR-MCNC: 97 MG/DL — SIGNIFICANT CHANGE UP (ref 70–99)

## 2020-11-30 PROCEDURE — 45378 DIAGNOSTIC COLONOSCOPY: CPT

## 2020-11-30 RX ORDER — CARVEDILOL PHOSPHATE 80 MG/1
1 CAPSULE, EXTENDED RELEASE ORAL
Qty: 0 | Refills: 0 | DISCHARGE

## 2020-11-30 RX ORDER — SODIUM CHLORIDE 9 MG/ML
1000 INJECTION INTRAMUSCULAR; INTRAVENOUS; SUBCUTANEOUS
Refills: 0 | Status: DISCONTINUED | OUTPATIENT
Start: 2020-12-03 | End: 2020-12-03

## 2020-11-30 RX ORDER — SODIUM CHLORIDE 9 MG/ML
1000 INJECTION INTRAMUSCULAR; INTRAVENOUS; SUBCUTANEOUS
Refills: 0 | Status: DISCONTINUED | OUTPATIENT
Start: 2020-11-30 | End: 2020-12-14

## 2020-11-30 RX ORDER — INSULIN ASPART 100 [IU]/ML
0 INJECTION, SOLUTION SUBCUTANEOUS
Qty: 0 | Refills: 0 | DISCHARGE

## 2020-11-30 RX ORDER — SODIUM CHLORIDE 9 MG/ML
1000 INJECTION, SOLUTION INTRAVENOUS
Refills: 0 | Status: DISCONTINUED | OUTPATIENT
Start: 2020-11-30 | End: 2020-11-30

## 2020-11-30 RX ADMIN — SODIUM CHLORIDE 75 MILLILITER(S): 9 INJECTION INTRAMUSCULAR; INTRAVENOUS; SUBCUTANEOUS at 08:33

## 2020-11-30 NOTE — H&P PST ADULT - NSICDXFAMILYHX_GEN_ALL_CORE_FT
FAMILY HISTORY:  FH: Alzheimers disease, mother   FH: diabetes mellitus, brother  FH: pancreatic cancer, father

## 2020-11-30 NOTE — ASU DISCHARGE PLAN (ADULT/PEDIATRIC) - CARE PROVIDER_API CALL
Mac Betts  COLON/RECTAL SURGERY  [address] [place] [address], [address]  [place] [address]  Phone: [phone]  Fax: [phone]  Follow Up Time:

## 2020-11-30 NOTE — PRE PROCEDURE NOTE - PRE PROCEDURE EVALUATION
Attending Physician:      Alpesh                      Procedure:  Colonoscopy    Indication for Procedure:  Colovesicle fistula  ________________________________________________________  PAST MEDICAL & SURGICAL HISTORY:  BPH (benign prostatic hyperplasia)    Peripheral vascular disease    Diabetes mellitus    Hypercholesteremia    Hypertension    S/P cholecystectomy    Fracture of pelvis    S/P femoral-popliteal bypass surgery  Left    Cholecystitis    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    H/O colectomy  Partial      ALLERGIES:  vancomycin (Nephrotoxicity)    HOME MEDICATIONS:  ascorbic acid 500 mg oral tablet: 1 tab(s) orally once a day  Aspir 81 oral delayed release tablet: 1 tab(s) orally once a day  atorvastatin 20 mg oral tablet: 1 tab(s) orally once a day (at bedtime)  Basaglar KwikPen 100 units/mL subcutaneous solution: 35 unit(s) subcutaneous once a day (at bedtime)  cilostazol 50 mg oral tablet: 1 tab(s) orally 2 times a day  Coreg 6.25 mg oral tablet: 1 tab(s) orally 2 times a day  Discharge pt home now:   Livalo 4 mg oral tablet: 1 tab(s) orally once a day  losartan 25 mg oral tablet: 0.5 tab(s) orally once a day  NovoLOG 100 units/mL injectable solution: sliding scale  Vascepa 1 g oral capsule: 1 cap(s) orally 2 times a day  Vitamin D3 50,000 intl units (1250 mcg) oral capsule: orally once a week  Zetia 10 mg oral tablet: 1 tab(s) orally once a day    AICD/PPM: [ ] yes   [x ] no    PERTINENT LAB DATA:    PHYSICAL EXAMINATION:      Constitutional: NAD    Neck:  No JVD  Respiratory: CTAB/L  Cardiovascular: S1 and S2  Gastrointestinal: BS+, soft, NT/ND  Extremities: No peripheral edema  Neurological: A/O x 3, no focal deficits        COMMENTS:    The patient is a suitable candidate for the planned procedure unless box checked [ ]  No, explain:

## 2020-11-30 NOTE — H&P PST ADULT - NSICDXPASTSURGICALHX_GEN_ALL_CORE_FT
PAST SURGICAL HISTORY:  Fracture of pelvis repair 2012    H/O colectomy Partial 2012    Ileostomy in place s/p reversal 2012    S/P cataract surgery left   with Lasik  2 years    S/P cholecystectomy     S/P femoral-popliteal bypass surgery Left  25 years ago    S/P LASIK surgery left    S/P lobectomy of lung right partial   10;/12/2019

## 2020-11-30 NOTE — H&P PST ADULT - ASSESSMENT
CAPRINI SCORE [CLOT]    AGE RELATED RISK FACTORS                                                       MOBILITY RELATED FACTORS  [ ] Age 41-60 years                                            (1 Point)                  [ ] Bed rest                                                        (1 Point)  [ ] Age: 61-74 years                                           (2 Points)                 [ ] Plaster cast                                                   (2 Points)  [x ] Age= 75 years                                              (3 Points)                 [ ] Bed bound for more than 72 hours                 (2 Points)    DISEASE RELATED RISK FACTORS                                               GENDER SPECIFIC FACTORS  [ ] Edema in the lower extremities                       (1 Point)                  [ ] Pregnancy                                                     (1 Point)  [ ] Varicose veins                                               (1 Point)                  [ ] Post-partum < 6 weeks                                   (1 Point)             [ ] BMI > 25 Kg/m2                                            (1 Point)                  [ ] Hormonal therapy  or oral contraception          (1 Point)                 [ ] Sepsis (in the previous month)                        (1 Point)                  [ ] History of pregnancy complications                 (1 point)  [ ] Pneumonia or serious lung disease                                               [ ] Unexplained or recurrent                     (1 Point)           (in the previous month)                               (1 Point)  [ ] Abnormal pulmonary function test                     (1 Point)                 SURGERY RELATED RISK FACTORS  [ ] Acute myocardial infarction                              (1 Point)                 [ ]  Section                                             (1 Point)  [ ] Congestive heart failure (in the previous month)  (1 Point)               [ ] Minor surgery                                                  (1 Point)   [ ] Inflammatory bowel disease                             (1 Point)                 [ ] Arthroscopic surgery                                        (2 Points)  [ ] Central venous access                                      (2 Points)                [x ] General surgery lasting more than 45 minutes   (2 Points)       [ ] Stroke (in the previous month)                          (5 Points)               [ ] Elective arthroplasty                                         (5 Points)                                                                                                                                               HEMATOLOGY RELATED FACTORS                                                 TRAUMA RELATED RISK FACTORS  [ ] Prior episodes of VTE                                     (3 Points)                [ ] Fracture of the hip, pelvis, or leg                       (5 Points)  [ ] Positive family history for VTE                         (3 Points)                 [ ] Acute spinal cord injury (in the previous month)  (5 Points)  [ ] Prothrombin 77222 A                                     (3 Points)                 [ ] Paralysis  (less than 1 month)                             (5 Points)  [ ] Factor V Leiden                                             (3 Points)                  [ ] Multiple Trauma within 1 month                        (5 Points)  [ ] Lupus anticoagulants                                     (3 Points)                                                           [ ] Anticardiolipin antibodies                               (3 Points)                                                       [ ] High homocysteine in the blood                      (3 Points)                                             [ ] Other congenital or acquired thrombophilia      (3 Points)                                                [ ] Heparin induced thrombocytopenia                  (3 Points)                                          Total Score [       5   ]    Caprini Score 0 - 2:  Low Risk, No VTE Prophylaxis required for most patients, encourage ambulation  Caprini Score 3 - 6:  At Risk, pharmacologic VTE prophylaxis is indicated for most patients (in the absence of a contraindication)  Caprini Score Greater than or = 7:  High Risk, pharmacologic VTE prophylaxis is indicated for most patients (in the absence of a contraindication)

## 2020-11-30 NOTE — ASU PATIENT PROFILE, ADULT - PMH
BPH (benign prostatic hyperplasia)    Diabetes mellitus    Hypercholesteremia    Hypertension    Peripheral vascular disease

## 2020-11-30 NOTE — H&P PST ADULT - HISTORY OF PRESENT ILLNESS
hospital stay 3-4 weeks ago tx IV antibiotics   colonoscopy 11/30/2020 this am 79 year old male with hx of Diabetes Mellitus type 2 on insulin, Benign hypertension,  Lung Cancer,  diverticulitis states  bowel  blocked urine flow  admitted hospital stay 3-4 weeks ago tx IV antibiotics.  Now for surgery.  colonoscopy 11/30/2020 this am  for surgery 12/3.    1.  Loop Recorder placed 3 months ago to rule out a fib.  Dr. Vázquez [phone] called for recent report card on chart.  2.  PVD, Benign Hypertension.  Obtain most recent cardiac office note and studies if possible EKG in [place]  3.  Diabetes Mellitus type 2 Basaglar 28 units night before fingerstick on arrival

## 2020-11-30 NOTE — H&P PST ADULT - NSICDXPROBLEM_GEN_ALL_CORE_FT
PROBLEM DIAGNOSES  Problem: Type 2 diabetes mellitus  Assessment and Plan: Basaglar 28 units  night before   fs on arrival    Problem: Diverticulitis of intestine  Assessment and Plan: eras  low anterior resection
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
## 2020-12-02 ENCOUNTER — TRANSCRIPTION ENCOUNTER (OUTPATIENT)
Age: 80
End: 2020-12-02

## 2020-12-03 ENCOUNTER — APPOINTMENT (OUTPATIENT)
Dept: COLORECTAL SURGERY | Facility: HOSPITAL | Age: 80
End: 2020-12-03
Payer: MEDICARE

## 2020-12-03 ENCOUNTER — INPATIENT (INPATIENT)
Facility: HOSPITAL | Age: 80
LOS: 2 days | Discharge: HOME | DRG: 663 | End: 2020-12-06
Attending: SURGERY | Admitting: SURGERY
Payer: MEDICARE

## 2020-12-03 ENCOUNTER — RESULT REVIEW (OUTPATIENT)
Age: 80
End: 2020-12-03

## 2020-12-03 VITALS
OXYGEN SATURATION: 100 % | TEMPERATURE: 98 F | SYSTOLIC BLOOD PRESSURE: 132 MMHG | HEART RATE: 99 BPM | HEIGHT: 70 IN | WEIGHT: 162.04 LBS | RESPIRATION RATE: 16 BRPM | DIASTOLIC BLOOD PRESSURE: 69 MMHG

## 2020-12-03 DIAGNOSIS — S32.9XXA FRACTURE OF UNSPECIFIED PARTS OF LUMBOSACRAL SPINE AND PELVIS, INITIAL ENCOUNTER FOR CLOSED FRACTURE: Chronic | ICD-10-CM

## 2020-12-03 DIAGNOSIS — K57.32 DIVERTICULITIS OF LARGE INTESTINE WITHOUT PERFORATION OR ABSCESS WITHOUT BLEEDING: ICD-10-CM

## 2020-12-03 DIAGNOSIS — Z98.890 OTHER SPECIFIED POSTPROCEDURAL STATES: Chronic | ICD-10-CM

## 2020-12-03 DIAGNOSIS — Z90.2 ACQUIRED ABSENCE OF LUNG [PART OF]: Chronic | ICD-10-CM

## 2020-12-03 DIAGNOSIS — Z98.49 CATARACT EXTRACTION STATUS, UNSPECIFIED EYE: Chronic | ICD-10-CM

## 2020-12-03 DIAGNOSIS — Z90.49 ACQUIRED ABSENCE OF OTHER SPECIFIED PARTS OF DIGESTIVE TRACT: Chronic | ICD-10-CM

## 2020-12-03 LAB
GLUCOSE BLDC GLUCOMTR-MCNC: 156 MG/DL — HIGH (ref 70–99)
GLUCOSE BLDC GLUCOMTR-MCNC: 178 MG/DL — HIGH (ref 70–99)
GLUCOSE BLDC GLUCOMTR-MCNC: 192 MG/DL — HIGH (ref 70–99)
GLUCOSE BLDC GLUCOMTR-MCNC: 197 MG/DL — HIGH (ref 70–99)
GLUCOSE BLDC GLUCOMTR-MCNC: 238 MG/DL — HIGH (ref 70–99)

## 2020-12-03 PROCEDURE — 99223 1ST HOSP IP/OBS HIGH 75: CPT | Mod: GC

## 2020-12-03 PROCEDURE — 52005 CYSTO W/URTRL CATHJ: CPT

## 2020-12-03 PROCEDURE — 45330 DIAGNOSTIC SIGMOIDOSCOPY: CPT

## 2020-12-03 PROCEDURE — S2900 ROBOTIC SURGICAL SYSTEM: CPT | Mod: NC

## 2020-12-03 PROCEDURE — 44207 L COLECTOMY/COLOPROCTOSTOMY: CPT

## 2020-12-03 PROCEDURE — 88307 TISSUE EXAM BY PATHOLOGIST: CPT | Mod: 26

## 2020-12-03 RX ORDER — ENOXAPARIN SODIUM 100 MG/ML
40 INJECTION SUBCUTANEOUS EVERY 24 HOURS
Refills: 0 | Status: DISCONTINUED | OUTPATIENT
Start: 2020-12-03 | End: 2020-12-06

## 2020-12-03 RX ORDER — GLUCAGON INJECTION, SOLUTION 0.5 MG/.1ML
1 INJECTION, SOLUTION SUBCUTANEOUS ONCE
Refills: 0 | Status: DISCONTINUED | OUTPATIENT
Start: 2020-12-03 | End: 2020-12-06

## 2020-12-03 RX ORDER — FERROUS SULFATE 325(65) MG
325 TABLET ORAL DAILY
Refills: 0 | Status: DISCONTINUED | OUTPATIENT
Start: 2020-12-03 | End: 2020-12-06

## 2020-12-03 RX ORDER — LIDOCAINE HCL 20 MG/ML
0.2 VIAL (ML) INJECTION ONCE
Refills: 0 | Status: DISCONTINUED | OUTPATIENT
Start: 2020-12-03 | End: 2020-12-03

## 2020-12-03 RX ORDER — DEXTROSE 50 % IN WATER 50 %
25 SYRINGE (ML) INTRAVENOUS ONCE
Refills: 0 | Status: DISCONTINUED | OUTPATIENT
Start: 2020-12-03 | End: 2020-12-06

## 2020-12-03 RX ORDER — SODIUM CHLORIDE 9 MG/ML
1000 INJECTION, SOLUTION INTRAVENOUS
Refills: 0 | Status: DISCONTINUED | OUTPATIENT
Start: 2020-12-03 | End: 2020-12-03

## 2020-12-03 RX ORDER — CHOLECALCIFEROL (VITAMIN D3) 125 MCG
0 CAPSULE ORAL
Qty: 0 | Refills: 0 | DISCHARGE

## 2020-12-03 RX ORDER — ONDANSETRON 8 MG/1
4 TABLET, FILM COATED ORAL ONCE
Refills: 0 | Status: DISCONTINUED | OUTPATIENT
Start: 2020-12-03 | End: 2020-12-03

## 2020-12-03 RX ORDER — LOSARTAN POTASSIUM 100 MG/1
12.5 TABLET, FILM COATED ORAL AT BEDTIME
Refills: 0 | Status: DISCONTINUED | OUTPATIENT
Start: 2020-12-03 | End: 2020-12-04

## 2020-12-03 RX ORDER — OXYCODONE HYDROCHLORIDE 5 MG/1
10 TABLET ORAL
Refills: 0 | Status: DISCONTINUED | OUTPATIENT
Start: 2020-12-03 | End: 2020-12-03

## 2020-12-03 RX ORDER — CARVEDILOL PHOSPHATE 80 MG/1
3.12 CAPSULE, EXTENDED RELEASE ORAL EVERY 12 HOURS
Refills: 0 | Status: DISCONTINUED | OUTPATIENT
Start: 2020-12-03 | End: 2020-12-06

## 2020-12-03 RX ORDER — CHLORHEXIDINE GLUCONATE 213 G/1000ML
1 SOLUTION TOPICAL ONCE
Refills: 0 | Status: COMPLETED | OUTPATIENT
Start: 2020-12-03 | End: 2020-12-03

## 2020-12-03 RX ORDER — ASPIRIN/CALCIUM CARB/MAGNESIUM 324 MG
1 TABLET ORAL
Qty: 0 | Refills: 0 | DISCHARGE

## 2020-12-03 RX ORDER — ERTAPENEM SODIUM 1 G/1
1000 INJECTION, POWDER, LYOPHILIZED, FOR SOLUTION INTRAMUSCULAR; INTRAVENOUS EVERY 24 HOURS
Refills: 0 | Status: COMPLETED | OUTPATIENT
Start: 2020-12-03 | End: 2020-12-05

## 2020-12-03 RX ORDER — DEXTROSE 50 % IN WATER 50 %
12.5 SYRINGE (ML) INTRAVENOUS ONCE
Refills: 0 | Status: DISCONTINUED | OUTPATIENT
Start: 2020-12-03 | End: 2020-12-06

## 2020-12-03 RX ORDER — ATORVASTATIN CALCIUM 80 MG/1
20 TABLET, FILM COATED ORAL AT BEDTIME
Refills: 0 | Status: DISCONTINUED | OUTPATIENT
Start: 2020-12-03 | End: 2020-12-06

## 2020-12-03 RX ORDER — CARVEDILOL PHOSPHATE 80 MG/1
1 CAPSULE, EXTENDED RELEASE ORAL
Qty: 0 | Refills: 0 | DISCHARGE

## 2020-12-03 RX ORDER — CILOSTAZOL 100 MG/1
50 TABLET ORAL
Refills: 0 | Status: DISCONTINUED | OUTPATIENT
Start: 2020-12-06 | End: 2020-12-06

## 2020-12-03 RX ORDER — EZETIMIBE 10 MG/1
1 TABLET ORAL
Qty: 0 | Refills: 0 | DISCHARGE

## 2020-12-03 RX ORDER — LISINOPRIL 2.5 MG/1
1 TABLET ORAL
Qty: 0 | Refills: 0 | DISCHARGE

## 2020-12-03 RX ORDER — HYDROMORPHONE HYDROCHLORIDE 2 MG/ML
0.5 INJECTION INTRAMUSCULAR; INTRAVENOUS; SUBCUTANEOUS
Refills: 0 | Status: DISCONTINUED | OUTPATIENT
Start: 2020-12-03 | End: 2020-12-03

## 2020-12-03 RX ORDER — GABAPENTIN 400 MG/1
600 CAPSULE ORAL ONCE
Refills: 0 | Status: COMPLETED | OUTPATIENT
Start: 2020-12-03 | End: 2020-12-03

## 2020-12-03 RX ORDER — NALOXONE HYDROCHLORIDE 4 MG/.1ML
0.1 SPRAY NASAL
Refills: 0 | Status: DISCONTINUED | OUTPATIENT
Start: 2020-12-03 | End: 2020-12-04

## 2020-12-03 RX ORDER — INSULIN GLARGINE 100 [IU]/ML
35 INJECTION, SOLUTION SUBCUTANEOUS AT BEDTIME
Refills: 0 | Status: DISCONTINUED | OUTPATIENT
Start: 2020-12-03 | End: 2020-12-06

## 2020-12-03 RX ORDER — OXYCODONE HYDROCHLORIDE 5 MG/1
5 TABLET ORAL EVERY 4 HOURS
Refills: 0 | Status: DISCONTINUED | OUTPATIENT
Start: 2020-12-03 | End: 2020-12-06

## 2020-12-03 RX ORDER — INSULIN LISPRO 100/ML
VIAL (ML) SUBCUTANEOUS AT BEDTIME
Refills: 0 | Status: DISCONTINUED | OUTPATIENT
Start: 2020-12-03 | End: 2020-12-06

## 2020-12-03 RX ORDER — MORPHINE SULFATE 50 MG/1
0.15 CAPSULE, EXTENDED RELEASE ORAL ONCE
Refills: 0 | Status: DISCONTINUED | OUTPATIENT
Start: 2020-12-03 | End: 2020-12-04

## 2020-12-03 RX ORDER — ASCORBIC ACID 60 MG
500 TABLET,CHEWABLE ORAL DAILY
Refills: 0 | Status: DISCONTINUED | OUTPATIENT
Start: 2020-12-03 | End: 2020-12-06

## 2020-12-03 RX ORDER — ONDANSETRON 8 MG/1
4 TABLET, FILM COATED ORAL EVERY 6 HOURS
Refills: 0 | Status: DISCONTINUED | OUTPATIENT
Start: 2020-12-03 | End: 2020-12-04

## 2020-12-03 RX ORDER — TAMSULOSIN HYDROCHLORIDE 0.4 MG/1
0.4 CAPSULE ORAL AT BEDTIME
Refills: 0 | Status: DISCONTINUED | OUTPATIENT
Start: 2020-12-03 | End: 2020-12-06

## 2020-12-03 RX ORDER — ACETAMINOPHEN 500 MG
1000 TABLET ORAL EVERY 6 HOURS
Refills: 0 | Status: DISCONTINUED | OUTPATIENT
Start: 2020-12-03 | End: 2020-12-04

## 2020-12-03 RX ORDER — INSULIN ASPART 100 [IU]/ML
0 INJECTION, SOLUTION SUBCUTANEOUS
Qty: 0 | Refills: 0 | DISCHARGE

## 2020-12-03 RX ORDER — PITAVASTATIN CALCIUM 1.04 MG/1
1 TABLET, FILM COATED ORAL
Qty: 0 | Refills: 0 | DISCHARGE

## 2020-12-03 RX ORDER — OXYCODONE HYDROCHLORIDE 5 MG/1
5 TABLET ORAL
Refills: 0 | Status: DISCONTINUED | OUTPATIENT
Start: 2020-12-03 | End: 2020-12-03

## 2020-12-03 RX ORDER — INSULIN GLARGINE 100 [IU]/ML
35 INJECTION, SOLUTION SUBCUTANEOUS
Qty: 0 | Refills: 0 | DISCHARGE

## 2020-12-03 RX ORDER — SODIUM CHLORIDE 9 MG/ML
3 INJECTION INTRAMUSCULAR; INTRAVENOUS; SUBCUTANEOUS EVERY 8 HOURS
Refills: 0 | Status: DISCONTINUED | OUTPATIENT
Start: 2020-12-03 | End: 2020-12-03

## 2020-12-03 RX ORDER — ICOSAPENT ETHYL 500 MG/1
1 CAPSULE, LIQUID FILLED ORAL
Qty: 0 | Refills: 0 | DISCHARGE

## 2020-12-03 RX ORDER — INSULIN LISPRO 100/ML
VIAL (ML) SUBCUTANEOUS
Refills: 0 | Status: DISCONTINUED | OUTPATIENT
Start: 2020-12-03 | End: 2020-12-06

## 2020-12-03 RX ORDER — CILOSTAZOL 100 MG/1
1 TABLET ORAL
Qty: 0 | Refills: 0 | DISCHARGE

## 2020-12-03 RX ORDER — LISINOPRIL 2.5 MG/1
2.5 TABLET ORAL DAILY
Refills: 0 | Status: DISCONTINUED | OUTPATIENT
Start: 2020-12-03 | End: 2020-12-06

## 2020-12-03 RX ORDER — DEXTROSE 50 % IN WATER 50 %
15 SYRINGE (ML) INTRAVENOUS ONCE
Refills: 0 | Status: DISCONTINUED | OUTPATIENT
Start: 2020-12-03 | End: 2020-12-06

## 2020-12-03 RX ORDER — CELECOXIB 200 MG/1
400 CAPSULE ORAL ONCE
Refills: 0 | Status: COMPLETED | OUTPATIENT
Start: 2020-12-03 | End: 2020-12-03

## 2020-12-03 RX ORDER — LOSARTAN POTASSIUM 100 MG/1
12.5 TABLET, FILM COATED ORAL DAILY
Refills: 0 | Status: DISCONTINUED | OUTPATIENT
Start: 2020-12-03 | End: 2020-12-03

## 2020-12-03 RX ORDER — OXYCODONE HYDROCHLORIDE 5 MG/1
10 TABLET ORAL EVERY 4 HOURS
Refills: 0 | Status: DISCONTINUED | OUTPATIENT
Start: 2020-12-03 | End: 2020-12-06

## 2020-12-03 RX ORDER — CEFOTETAN DISODIUM 1 G
2 VIAL (EA) INJECTION ONCE
Refills: 0 | Status: COMPLETED | OUTPATIENT
Start: 2020-12-03 | End: 2020-12-03

## 2020-12-03 RX ORDER — KETOROLAC TROMETHAMINE 30 MG/ML
15 SYRINGE (ML) INJECTION EVERY 6 HOURS
Refills: 0 | Status: DISCONTINUED | OUTPATIENT
Start: 2020-12-03 | End: 2020-12-04

## 2020-12-03 RX ORDER — SODIUM CHLORIDE 9 MG/ML
1000 INJECTION, SOLUTION INTRAVENOUS
Refills: 0 | Status: DISCONTINUED | OUTPATIENT
Start: 2020-12-03 | End: 2020-12-04

## 2020-12-03 RX ADMIN — Medication 400 MILLIGRAM(S): at 17:51

## 2020-12-03 RX ADMIN — CARVEDILOL PHOSPHATE 3.12 MILLIGRAM(S): 80 CAPSULE, EXTENDED RELEASE ORAL at 17:51

## 2020-12-03 RX ADMIN — LISINOPRIL 2.5 MILLIGRAM(S): 2.5 TABLET ORAL at 14:40

## 2020-12-03 RX ADMIN — TAMSULOSIN HYDROCHLORIDE 0.4 MILLIGRAM(S): 0.4 CAPSULE ORAL at 22:49

## 2020-12-03 RX ADMIN — ERTAPENEM SODIUM 120 MILLIGRAM(S): 1 INJECTION, POWDER, LYOPHILIZED, FOR SOLUTION INTRAMUSCULAR; INTRAVENOUS at 22:35

## 2020-12-03 RX ADMIN — ENOXAPARIN SODIUM 40 MILLIGRAM(S): 100 INJECTION SUBCUTANEOUS at 17:51

## 2020-12-03 RX ADMIN — INSULIN GLARGINE 35 UNIT(S): 100 INJECTION, SOLUTION SUBCUTANEOUS at 23:27

## 2020-12-03 RX ADMIN — Medication 2: at 15:23

## 2020-12-03 RX ADMIN — GABAPENTIN 600 MILLIGRAM(S): 400 CAPSULE ORAL at 06:32

## 2020-12-03 RX ADMIN — Medication 1000 MILLIGRAM(S): at 18:21

## 2020-12-03 RX ADMIN — SODIUM CHLORIDE 40 MILLILITER(S): 9 INJECTION, SOLUTION INTRAVENOUS at 14:45

## 2020-12-03 NOTE — CHART NOTE - NSCHARTNOTEFT_GEN_A_CORE
Surgery Post-Op Note    Pre-Op Dx:   Procedure: Flexible sigmoidoscopy  Robot-assisted laparoscopic resection of sigmoid colon  Surgeon:      Subjective:   Pt seen and examined at the bedside. Pt w/ no complaints. Denies F/C/N/V. Pain controlled with medication.     Vital Signs Last 24 Hrs  T(C): 36.4 (03 Dec 2020 16:09), Max: 36.4 (03 Dec 2020 06:39)  T(F): 97.5 (03 Dec 2020 16:09), Max: 97.5 (03 Dec 2020 06:39)  HR: 80 (03 Dec 2020 16:09) (69 - 99)  BP: 159/77 (03 Dec 2020 16:09) (123/65 - 178/80)  BP(mean): 91 (03 Dec 2020 15:00) (89 - 115)  RR: 18 (03 Dec 2020 16:09) (16 - 18)  SpO2: 100% (03 Dec 2020 16:09) (100% - 100%)    Physical Exam:  General: NAD, resting comfortably in bed  Neuro: A/O x 3, no focal deficits  Pulmonary: Nonlabored breathing, no respiratory distress  Cardiovascular: NSR  Abdominal: soft, ATTP. ND  Incision: C/D/I   Drains:   Extremities: WWP        LABS:  CAPILLARY BLOOD GLUCOSE    POCT Blood Glucose.: 178 mg/dL (03 Dec 2020 15:19)  POCT Blood Glucose.: 197 mg/dL (03 Dec 2020 12:41)  POCT Blood Glucose.: 238 mg/dL (03 Dec 2020 06:23)    ABO Interpretation: A (12-03 @ 06:31)    Assessment:  78 y/o M PMHx of IDDMII, Lung ca s/p resection, recent admission [facility] 10/27-11/2 for ESBL Klebsiella UTI c/b colovesicular fistula returned to hospital for elective laparoscopic sigmoidectomy. Pt seen on floor post-procedure, feeling well.     Plan:  As per eras protocol   Diet: CLD, will advance tomorrow   Hartley removal tomorrow AM  Mag oxide tomorrow   Pain/nausea control PRN  Incentive spirometer/OOB/Ambulate    [id number] Surgery Post-Op Note    Pre-Op Dx:   Procedure: Flexible sigmoidoscopy  Robot-assisted laparoscopic resection of sigmoid colon  Surgeon:      Subjective:   Pt seen and examined at the bedside. Pt w/ no complaints. Denies F/C/N/V. Pain controlled with medication.     Vital Signs Last 24 Hrs  T(C): 36.4 (03 Dec 2020 16:09), Max: 36.4 (03 Dec 2020 06:39)  T(F): 97.5 (03 Dec 2020 16:09), Max: 97.5 (03 Dec 2020 06:39)  HR: 80 (03 Dec 2020 16:09) (69 - 99)  BP: 159/77 (03 Dec 2020 16:09) (123/65 - 178/80)  BP(mean): 91 (03 Dec 2020 15:00) (89 - 115)  RR: 18 (03 Dec 2020 16:09) (16 - 18)  SpO2: 100% (03 Dec 2020 16:09) (100% - 100%)    Physical Exam:  General: NAD, resting comfortably in bed  Neuro: A/O x 3, no focal deficits  Pulmonary: Nonlabored breathing, no respiratory distress  Cardiovascular: NSR  Abdominal: soft, ATTP. ND  Incision: C/D/I   Drains:   Extremities: WWP        LABS:  CAPILLARY BLOOD GLUCOSE    POCT Blood Glucose.: 178 mg/dL (03 Dec 2020 15:19)  POCT Blood Glucose.: 197 mg/dL (03 Dec 2020 12:41)  POCT Blood Glucose.: 238 mg/dL (03 Dec 2020 06:23)    ABO Interpretation: A (12-03 @ 06:31)    Assessment:  80 y/o M PMHx of IDDMII, Lung ca s/p resection, recent admission [facility] 10/27-11/2 for ESBL Klebsiella UTI c/b colovesicular fistula returned to hospital for elective laparoscopic sigmoidectomy. Pt seen on floor post-procedure, feeling well.     Plan:  As per eras protocol   Diet: CLD, will advance tomorrow   Plan to dispo pt with Hartley   Mag oxide tomorrow   Pain/nausea control PRN  Incentive spirometer/OOB/Ambulate    [id number]

## 2020-12-03 NOTE — BRIEF OPERATIVE NOTE - NSICDXBRIEFPREOP_GEN_ALL_CORE_FT
PRE-OP DIAGNOSIS:  Diverticulitis 03-Dec-2020 13:03:20  Rachel Grajeda  Colovesical fistula 03-Dec-2020 13:02:31  Rachel Grajeda

## 2020-12-03 NOTE — BRIEF OPERATIVE NOTE - OPERATION/FINDINGS
Inflamed colon densely adhered to the bladder, taken down with electrocautery. Distal aspect of disease stapled with black load at top of rectum. End to end colorectal anastomosis at ~18cm with 29 mm EEA, insufflation dense without evidence of leak.

## 2020-12-03 NOTE — CONSULT NOTE ADULT - ASSESSMENT
80 y/o M PMHx of IDDMII, Lung ca s/p resection, recent admission [facility] 10/27-11/2 for ESBL Klebsiella UTI c/b colovesicular fistula returned to hospital for elective laproscopic sigmoidectomy.    UTI secondary to E. coli  -previously grew ESBL Klebsiella from Urine  -recieved cefotetan pre-operative   78 y/o M PMHx of IDDMII, Lung ca s/p resection, recent admission [facility] 10/27-11/2 for ESBL Klebsiella UTI c/b colovesicular fistula returned to hospital for elective laproscopic sigmoidectomy.    UTI secondary to E. coli  -previously grew ESBL Klebsiella from Urine  -recieved cefotetan pre-operative  -given previous ESBL, would salomon-operatively treat with Ertapenem 1g daily 78 y/o M PMHx of IDDMII, Lung ca s/p resection, recent admission [facility] 10/27-11/2 for ESBL Klebsiella UTI c/b colovesicular fistula returned to hospital for elective laproscopic sigmoidectomy.    UTI secondary to E. coli  -previously grew ESBL Klebsiella from Urine  -recieved cefotetan pre-operative  -given previous ESBL, would salomon-operatively treat with Ertapenem 1g daily for short course while in hospital    Discussed recommendations with primary team PA

## 2020-12-03 NOTE — BRIEF OPERATIVE NOTE - NSICDXBRIEFPROCEDURE_GEN_ALL_CORE_FT
PROCEDURES:  Flexible sigmoidoscopy 03-Dec-2020 13:01:55  Rachel Grajeda  Robot-assisted laparoscopic resection of sigmoid colon 03-Dec-2020 13:01:16  Rachel Grajeda
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
## 2020-12-03 NOTE — BRIEF OPERATIVE NOTE - NSICDXBRIEFPOSTOP_GEN_ALL_CORE_FT
POST-OP DIAGNOSIS:  Colovesical fistula 03-Dec-2020 13:03:43  Rachel Grajeda  Diverticulitis 03-Dec-2020 13:03:31  Rachel Grajeda

## 2020-12-04 ENCOUNTER — TRANSCRIPTION ENCOUNTER (OUTPATIENT)
Age: 80
End: 2020-12-04

## 2020-12-04 LAB
ANION GAP SERPL CALC-SCNC: 10 MMOL/L — SIGNIFICANT CHANGE UP (ref 5–17)
BASOPHILS # BLD AUTO: 0.05 K/UL — SIGNIFICANT CHANGE UP (ref 0–0.2)
BASOPHILS NFR BLD AUTO: 0.6 % — SIGNIFICANT CHANGE UP (ref 0–2)
BUN SERPL-MCNC: 14 MG/DL — SIGNIFICANT CHANGE UP (ref 7–23)
CALCIUM SERPL-MCNC: 7.9 MG/DL — LOW (ref 8.4–10.5)
CHLORIDE SERPL-SCNC: 99 MMOL/L — SIGNIFICANT CHANGE UP (ref 96–108)
CO2 SERPL-SCNC: 23 MMOL/L — SIGNIFICANT CHANGE UP (ref 22–31)
CREAT SERPL-MCNC: 1.08 MG/DL — SIGNIFICANT CHANGE UP (ref 0.5–1.3)
EOSINOPHIL # BLD AUTO: 0.18 K/UL — SIGNIFICANT CHANGE UP (ref 0–0.5)
EOSINOPHIL NFR BLD AUTO: 2.3 % — SIGNIFICANT CHANGE UP (ref 0–6)
GLUCOSE BLDC GLUCOMTR-MCNC: 140 MG/DL — HIGH (ref 70–99)
GLUCOSE BLDC GLUCOMTR-MCNC: 156 MG/DL — HIGH (ref 70–99)
GLUCOSE BLDC GLUCOMTR-MCNC: 181 MG/DL — HIGH (ref 70–99)
GLUCOSE BLDC GLUCOMTR-MCNC: 260 MG/DL — HIGH (ref 70–99)
GLUCOSE BLDC GLUCOMTR-MCNC: 273 MG/DL — HIGH (ref 70–99)
GLUCOSE BLDC GLUCOMTR-MCNC: 72 MG/DL — SIGNIFICANT CHANGE UP (ref 70–99)
GLUCOSE SERPL-MCNC: 95 MG/DL — SIGNIFICANT CHANGE UP (ref 70–99)
HCT VFR BLD CALC: 33.9 % — LOW (ref 39–50)
HGB BLD-MCNC: 10.6 G/DL — LOW (ref 13–17)
IMM GRANULOCYTES NFR BLD AUTO: 0.8 % — SIGNIFICANT CHANGE UP (ref 0–1.5)
LYMPHOCYTES # BLD AUTO: 0.94 K/UL — LOW (ref 1–3.3)
LYMPHOCYTES # BLD AUTO: 12 % — LOW (ref 13–44)
MAGNESIUM SERPL-MCNC: 2.1 MG/DL — SIGNIFICANT CHANGE UP (ref 1.6–2.6)
MCHC RBC-ENTMCNC: 27 PG — SIGNIFICANT CHANGE UP (ref 27–34)
MCHC RBC-ENTMCNC: 31.3 GM/DL — LOW (ref 32–36)
MCV RBC AUTO: 86.3 FL — SIGNIFICANT CHANGE UP (ref 80–100)
MONOCYTES # BLD AUTO: 0.6 K/UL — SIGNIFICANT CHANGE UP (ref 0–0.9)
MONOCYTES NFR BLD AUTO: 7.7 % — SIGNIFICANT CHANGE UP (ref 2–14)
NEUTROPHILS # BLD AUTO: 6.01 K/UL — SIGNIFICANT CHANGE UP (ref 1.8–7.4)
NEUTROPHILS NFR BLD AUTO: 76.6 % — SIGNIFICANT CHANGE UP (ref 43–77)
NRBC # BLD: 0 /100 WBCS — SIGNIFICANT CHANGE UP (ref 0–0)
PHOSPHATE SERPL-MCNC: 3.3 MG/DL — SIGNIFICANT CHANGE UP (ref 2.5–4.5)
PLATELET # BLD AUTO: 257 K/UL — SIGNIFICANT CHANGE UP (ref 150–400)
POTASSIUM SERPL-MCNC: 4 MMOL/L — SIGNIFICANT CHANGE UP (ref 3.5–5.3)
POTASSIUM SERPL-SCNC: 4 MMOL/L — SIGNIFICANT CHANGE UP (ref 3.5–5.3)
RBC # BLD: 3.93 M/UL — LOW (ref 4.2–5.8)
RBC # FLD: 15.8 % — HIGH (ref 10.3–14.5)
SODIUM SERPL-SCNC: 132 MMOL/L — LOW (ref 135–145)
WBC # BLD: 7.84 K/UL — SIGNIFICANT CHANGE UP (ref 3.8–10.5)
WBC # FLD AUTO: 7.84 K/UL — SIGNIFICANT CHANGE UP (ref 3.8–10.5)

## 2020-12-04 PROCEDURE — 99232 SBSQ HOSP IP/OBS MODERATE 35: CPT

## 2020-12-04 RX ORDER — MAGNESIUM OXIDE 400 MG ORAL TABLET 241.3 MG
1000 TABLET ORAL
Refills: 0 | Status: DISCONTINUED | OUTPATIENT
Start: 2020-12-04 | End: 2020-12-05

## 2020-12-04 RX ORDER — LOSARTAN POTASSIUM 100 MG/1
12.5 TABLET, FILM COATED ORAL DAILY
Refills: 0 | Status: DISCONTINUED | OUTPATIENT
Start: 2020-12-04 | End: 2020-12-05

## 2020-12-04 RX ORDER — ACETAMINOPHEN 500 MG
1000 TABLET ORAL EVERY 6 HOURS
Refills: 0 | Status: DISCONTINUED | OUTPATIENT
Start: 2020-12-04 | End: 2020-12-06

## 2020-12-04 RX ORDER — IBUPROFEN 200 MG
400 TABLET ORAL EVERY 6 HOURS
Refills: 0 | Status: DISCONTINUED | OUTPATIENT
Start: 2020-12-04 | End: 2020-12-06

## 2020-12-04 RX ORDER — LOSARTAN POTASSIUM 100 MG/1
25 TABLET, FILM COATED ORAL DAILY
Refills: 0 | Status: DISCONTINUED | OUTPATIENT
Start: 2020-12-04 | End: 2020-12-04

## 2020-12-04 RX ADMIN — MAGNESIUM OXIDE 400 MG ORAL TABLET 1000 MILLIGRAM(S): 241.3 TABLET ORAL at 17:27

## 2020-12-04 RX ADMIN — CARVEDILOL PHOSPHATE 3.12 MILLIGRAM(S): 80 CAPSULE, EXTENDED RELEASE ORAL at 06:45

## 2020-12-04 RX ADMIN — Medication 325 MILLIGRAM(S): at 11:44

## 2020-12-04 RX ADMIN — TAMSULOSIN HYDROCHLORIDE 0.4 MILLIGRAM(S): 0.4 CAPSULE ORAL at 23:32

## 2020-12-04 RX ADMIN — Medication 1000 MILLIGRAM(S): at 00:45

## 2020-12-04 RX ADMIN — Medication 400 MILLIGRAM(S): at 00:00

## 2020-12-04 RX ADMIN — Medication 1000 MILLIGRAM(S): at 23:33

## 2020-12-04 RX ADMIN — CARVEDILOL PHOSPHATE 3.12 MILLIGRAM(S): 80 CAPSULE, EXTENDED RELEASE ORAL at 17:22

## 2020-12-04 RX ADMIN — INSULIN GLARGINE 35 UNIT(S): 100 INJECTION, SOLUTION SUBCUTANEOUS at 23:32

## 2020-12-04 RX ADMIN — Medication 1000 MILLIGRAM(S): at 07:00

## 2020-12-04 RX ADMIN — Medication 500 MILLIGRAM(S): at 11:44

## 2020-12-04 RX ADMIN — ERTAPENEM SODIUM 120 MILLIGRAM(S): 1 INJECTION, POWDER, LYOPHILIZED, FOR SOLUTION INTRAMUSCULAR; INTRAVENOUS at 23:32

## 2020-12-04 RX ADMIN — Medication 400 MILLIGRAM(S): at 06:21

## 2020-12-04 RX ADMIN — Medication 2: at 18:45

## 2020-12-04 RX ADMIN — ENOXAPARIN SODIUM 40 MILLIGRAM(S): 100 INJECTION SUBCUTANEOUS at 17:21

## 2020-12-04 RX ADMIN — Medication 400 MILLIGRAM(S): at 11:44

## 2020-12-04 RX ADMIN — Medication 1: at 23:31

## 2020-12-04 RX ADMIN — Medication 1000 MILLIGRAM(S): at 17:26

## 2020-12-04 RX ADMIN — Medication 400 MILLIGRAM(S): at 17:27

## 2020-12-04 RX ADMIN — Medication 400 MILLIGRAM(S): at 11:45

## 2020-12-04 RX ADMIN — MAGNESIUM OXIDE 400 MG ORAL TABLET 1000 MILLIGRAM(S): 241.3 TABLET ORAL at 09:39

## 2020-12-04 RX ADMIN — Medication 1000 MILLIGRAM(S): at 17:27

## 2020-12-04 NOTE — PROGRESS NOTE ADULT - ASSESSMENT
78 y/o M PMHx of IDDMII, Lung ca s/p resection, recent admission [facility] 10/27-11/2 for ESBL Klebsiella UTI c/b colovesicular fistula returned to hospital for elective laproscopic sigmoidectomy.    UTI secondary to E. coli  -previously grew ESBL Klebsiella from Urine  -recieved cefotetan pre-operative  -given previous ESBL, would salomon-operatively treat with Ertapenem 1g daily for short course while in hospital
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
## 2020-12-04 NOTE — DIETITIAN INITIAL EVALUATION ADULT. - REASON FOR ADMISSION
This is a "80 y/o M PMHx of IDDMII, Lung ca s/p resection, recent admission [facility] 10/27-11/2 for ESBL Klebsiella UTI c/b colovesicular fistula returned to hospital for elective laparoscopic sigmoidectomy."

## 2020-12-04 NOTE — PROGRESS NOTE ADULT - ASSESSMENT
Assessment:  80 y/o M PMHx of IDDMII, Lung ca s/p resection, recent admission [facility] 10/27-11/2 for ESBL Klebsiella UTI c/b colovesicular fistula returned to hospital for elective laparoscopic sigmoidectomy. Pt seen on floor post-procedure, feeling well.     Plan:  As per eras protocol   - Diet: advance to LRD today  - pain control with PO meds  - f/u ID recs  - Incentive spirometer/OOB/Ambulate  - Plan to dispo pt with Hartley     Red Team Surgery [phone]

## 2020-12-04 NOTE — DIETITIAN INITIAL EVALUATION ADULT. - PERTINENT LABORATORY DATA
Na 132  POCT Blood Glucose.: 72 mg/dL (12-04-20 @ 09:00)  POCT Blood Glucose.: 156 mg/dL (12-03-20 @ 21:58)  POCT Blood Glucose.: 192 mg/dL (12-03-20 @ 18:01)  POCT Blood Glucose.: 178 mg/dL (12-03-20 @ 15:19)  POCT Blood Glucose.: 197 mg/dL (12-03-20 @ 12:41)  HbA1c: 8.1% (11/30)

## 2020-12-04 NOTE — PROGRESS NOTE ADULT - SUBJECTIVE AND OBJECTIVE BOX
Day 1 of Anesthesia Pain Management Service    SUBJECTIVE: Doing ok  Pain Scale Score:          [X] Refer to charted pain scores    THERAPY:    s/p    150 mcg PF morphine on 12\3\2020      MEDICATIONS  (STANDING):  acetaminophen   Tablet .. 1000 milliGRAM(s) Oral every 6 hours  ascorbic acid 500 milliGRAM(s) Oral daily  atorvastatin 20 milliGRAM(s) Oral at bedtime  carvedilol 3.125 milliGRAM(s) Oral every 12 hours  dextrose 40% Gel 15 Gram(s) Oral once  dextrose 50% Injectable 25 Gram(s) IV Push once  dextrose 50% Injectable 12.5 Gram(s) IV Push once  dextrose 50% Injectable 25 Gram(s) IV Push once  enoxaparin Injectable 40 milliGRAM(s) SubCutaneous every 24 hours  ertapenem  IVPB 1000 milliGRAM(s) IV Intermittent every 24 hours  ferrous    sulfate 325 milliGRAM(s) Oral daily  glucagon  Injectable 1 milliGRAM(s) IntraMuscular once  ibuprofen  Tablet. 400 milliGRAM(s) Oral every 6 hours  insulin glargine Injectable (LANTUS) 35 Unit(s) SubCutaneous at bedtime  insulin lispro (ADMELOG) corrective regimen sliding scale   SubCutaneous three times a day before meals  insulin lispro (ADMELOG) corrective regimen sliding scale   SubCutaneous at bedtime  lisinopril 2.5 milliGRAM(s) Oral daily  losartan 12.5 milliGRAM(s) Oral at bedtime  magnesium oxide 1000 milliGRAM(s) Oral two times a day with meals  tamsulosin 0.4 milliGRAM(s) Oral at bedtime    MEDICATIONS  (PRN):  oxyCODONE    IR 5 milliGRAM(s) Oral every 4 hours PRN Moderate Pain (4 - 6)  oxyCODONE    IR 10 milliGRAM(s) Oral every 4 hours PRN Severe Pain (7 - 10)      OBJECTIVE:    Sedation:        	[X] Alert	 [ ] Drowsy	[ ] Arousable      [ ] Asleep       [ ] Unresponsive    Side Effects:	[X] None 	[ ] Nausea	[ ] Vomiting         [ ] Pruritus  		[ ] Weakness            [ ] Numbness	          [ ] Other:    Vital Signs Last 24 Hrs  T(C): 35.8 (04 Dec 2020 01:26), Max: 36.4 (03 Dec 2020 16:09)  T(F): 96.5 (04 Dec 2020 01:26), Max: 97.6 (03 Dec 2020 17:09)  HR: 78 (04 Dec 2020 06:23) (54 - 89)  BP: 169/75 (04 Dec 2020 06:23) (108/64 - 178/80)  BP(mean): 91 (03 Dec 2020 15:00) (89 - 115)  RR: 18 (04 Dec 2020 06:23) (16 - 18)  SpO2: 97% (04 Dec 2020 06:23) (97% - 100%)    ASSESSMENT/ PLAN  [X] Patient transitioned to prn analgesics  [X] Pain management per primary service, pain service to sign off   [X]Documentation and Verification of current medications

## 2020-12-04 NOTE — DISCHARGE NOTE PROVIDER - NSDCCPCAREPLAN_GEN_ALL_CORE_FT
PRINCIPAL DISCHARGE DIAGNOSIS  Diagnosis: S/P laparoscopic-assisted sigmoidectomy  Assessment and Plan of Treatment: You underwent a robot-assisted sigmoid colon resection for a colovesicular fistula.  .WOUND CARE:  Please keep incisions clean and dry. Please do not Scrub or rub incisions. Do not use lotion or powder on incisions.   BATHING: You may shower and/or sponge bathe. You may use warm soapy water in the shower and rinse, pat dry.  ACTIVITY: No heavy lifting or straining. Otherwise, you may return to your usual level of physical activity. If you are taking narcotic pain medication DO NOT drive a car, operate machinery or make important decisions.  DIET: Return to your usual diet.  NOTIFY YOUR SURGEON IF YOU HAVE: any bleeding that does not stop, any pus draining from your wound(s), any fever (over 100.4 F) persistent nausea/vomiting, or if your pain is not controlled on your discharge pain medications, unable to urinate.  Please follow up with your primary care physician in one week regarding your hospitalization, bring copies of your discharge paperwork.  Please follow up with your surgeon, Dr. Betts on Wednesday 12/9 to remove the Hartley catheter and wound VAC.

## 2020-12-04 NOTE — DIETITIAN INITIAL EVALUATION ADULT. - OTHER INFO
Weights; pt reports weight has been stable, reports UBW as ~ 160lbs. Current dosing weight is 161.7lbs (12/3/20).     Pt advanced to low fiber diet today. Reports good tolerance to clear liquid diet earlier, has yet to trial low fiber tray. Denies any nausea, emesis. +flatus/+BM per chart. Pt unfamiliar with low fiber diet, amendable to review. Provided low-fiber nutrition therapy including importance of avoiding  fiber rich foods, fresh fruits/vegetables, whole grains, and added fiber in processed foods. Discussed chewing foods well and adequate hydration and protein intake. Discussed portion control of carbohydrates and pairing carbohydrates with protein to optimize glycemic control. Also encouraged pt to continue limiting concentrated sweets in the diet. Discussed gradual reintroduction of fiber back into diet once cleared by MD. Pt verbalized understanding and accepted written handout. Patient with no nutrition-related questions at this time. Made aware RD remains available as needed.

## 2020-12-04 NOTE — PHYSICAL THERAPY INITIAL EVALUATION ADULT - PERTINENT HX OF CURRENT PROBLEM, REHAB EVAL
80 y/o M PMHx of IDDMII, Lung ca s/p resection, recent admission [facility] 10/27-11/2 for ESBL Klebsiella UTI c/b colovesicular fistula returned to hospital for elective laparoscopic sigmoidectomy. no Imaging available.

## 2020-12-04 NOTE — DIETITIAN INITIAL EVALUATION ADULT. - ORAL INTAKE PTA/DIET HISTORY
Pt reports good PO intake and appetite PTA, consumes 3 meals per day + snacks. Diet consists of a variety of foods. Avoids concentrated sweets. Confirms NKFA. Pt denies chewing/swallowing difficulty, nausea, vomiting, diarrhea, constipation PTA.  Denies Takes vitamin C, iron. Pt with hx of T2DM, takes Novolog, Basaglar, checks BG daily.

## 2020-12-04 NOTE — PROGRESS NOTE ADULT - SUBJECTIVE AND OBJECTIVE BOX
Surgery Progress Note    SUBJECTIVE: Pt seen and examined at bedside. Patient comfortable and in no-apparent distress. No nausea, vomiting, diarrhea. Pain is controlled. +Gas/+BM. Tolerating diet.    Vital Signs Last 24 Hrs  T(C): 36.8 (04 Dec 2020 07:45), Max: 36.8 (04 Dec 2020 07:45)  T(F): 98.2 (04 Dec 2020 07:45), Max: 98.2 (04 Dec 2020 07:45)  HR: 62 (04 Dec 2020 07:45) (54 - 89)  BP: 108/60 (04 Dec 2020 07:45) (108/60 - 178/80)  BP(mean): 91 (03 Dec 2020 15:00) (89 - 115)  RR: 18 (04 Dec 2020 07:45) (16 - 18)  SpO2: 96% (04 Dec 2020 07:45) (96% - 100%)    Physical Exam:  General Appearance: Appears well, NAD  Respiratory: No labored breathing  CV: Pulse regularly present  Abdomen: Soft, nontender, nondistended; port site incisions with dressing c/d/i    LABS:                        10.6   7.84  )-----------( 257      ( 04 Dec 2020 06:50 )             33.9     12-04    132<L>  |  99  |  14  ----------------------------<  95  4.0   |  23  |  1.08    Ca    7.9<L>      04 Dec 2020 06:50  Phos  3.3     12-04  Mg     2.1     12-04            INs and OUTs:    12-03-20 @ 07:01  -  12-04-20 @ 07:00  --------------------------------------------------------  IN: 1130 mL / OUT: 1155 mL / NET: -25 mL

## 2020-12-04 NOTE — DISCHARGE NOTE PROVIDER - NSDCMRMEDTOKEN_GEN_ALL_CORE_FT
ascorbic acid 500 mg oral tablet: 1 tab(s) orally once a day  Aspir 81 oral delayed release tablet: 1 tab(s) orally once a day  atorvastatin 20 mg oral tablet: 1 tab(s) orally once a day (at bedtime)  Basaglar KwikPen 100 units/mL subcutaneous solution: 35 unit(s) subcutaneous once a day (at bedtime)  cilostazol 50 mg oral tablet: 1 tab(s) orally 2 times a day  Coreg 3.125 mg oral tablet: 1 tab(s) orally 2 times a day  ferrous sulfate 325 mg (65 mg elemental iron) oral tablet: 1 tab(s) orally once a day  lisinopril 2.5 mg oral tablet: 1 tab(s) orally once a day  Livalo 4 mg oral tablet: 1 tab(s) orally once a day  losartan 25 mg oral tablet: 0.5 tab(s) orally once a day  NovoLOG 100 units/mL injectable solution: injectable 3 times a day  sliding scale   outpatient pt:   tamsulosin 0.4 mg oral capsule: 2 cap(s) orally once a day (at bedtime)  Vascepa 1 g oral capsule: 1 cap(s) orally 2 times a day  Vitamin D3 50,000 intl units (1250 mcg) oral capsule: orally once a week  Zetia 10 mg oral tablet: 1 tab(s) orally once a day   acetaminophen 500 mg oral tablet: 2 tab(s) orally every 6 hours  ascorbic acid 500 mg oral tablet: 1 tab(s) orally once a day  Aspir 81 oral delayed release tablet: 1 tab(s) orally once a day  atorvastatin 20 mg oral tablet: 1 tab(s) orally once a day (at bedtime)  Basaglar KwikPen 100 units/mL subcutaneous solution: 35 unit(s) subcutaneous once a day (at bedtime)  cilostazol 50 mg oral tablet: 1 tab(s) orally 2 times a day  Coreg 3.125 mg oral tablet: 1 tab(s) orally 2 times a day  Eliquis 2.5 mg oral tablet: 1 tab(s) orally every 12 hours MDD:2 tablets daily  ferrous sulfate 325 mg (65 mg elemental iron) oral tablet: 1 tab(s) orally once a day  ibuprofen 400 mg oral tablet: 1 tab(s) orally every 6 hours  lisinopril 2.5 mg oral tablet: 1 tab(s) orally once a day  Livalo 4 mg oral tablet: 1 tab(s) orally once a day  losartan 25 mg oral tablet: 0.5 tab(s) orally once a day  NovoLOG 100 units/mL injectable solution: injectable 3 times a day  sliding scale   outpatient pt:   oxyCODONE 5 mg oral tablet: 1 tab(s) orally every 6 hours MDD:4 tablets daily as needed for breakthrough pain  tamsulosin 0.4 mg oral capsule: 2 cap(s) orally once a day (at bedtime)  Vascepa 1 g oral capsule: 1 cap(s) orally 2 times a day  Vitamin D3 50,000 intl units (1250 mcg) oral capsule: orally once a week  Zetia 10 mg oral tablet: 1 tab(s) orally once a day   acetaminophen 500 mg oral tablet: 2 tab(s) orally every 6 hours  ascorbic acid 500 mg oral tablet: 1 tab(s) orally once a day  Aspir 81 oral delayed release tablet: 1 tab(s) orally once a day  atorvastatin 20 mg oral tablet: 1 tab(s) orally once a day (at bedtime)  Basaglar KwikPen 100 units/mL subcutaneous solution: 35 unit(s) subcutaneous once a day (at bedtime)  cilostazol 50 mg oral tablet: 1 tab(s) orally 2 times a day  Coreg 3.125 mg oral tablet: 1 tab(s) orally 2 times a day  Eliquis 2.5 mg oral tablet: 1 tab(s) orally every 12 hours MDD:2 tablets daily  ferrous sulfate 325 mg (65 mg elemental iron) oral tablet: 1 tab(s) orally once a day  ibuprofen 400 mg oral tablet: 1 tab(s) orally every 6 hours  lisinopril 2.5 mg oral tablet: 1 tab(s) orally once a day  Livalo 4 mg oral tablet: 1 tab(s) orally once a day  NovoLOG 100 units/mL injectable solution: injectable 3 times a day  sliding scale   outpatient pt:   oxyCODONE 5 mg oral tablet: 1 tab(s) orally every 6 hours MDD:4 tablets daily as needed for breakthrough pain  tamsulosin 0.4 mg oral capsule: 2 cap(s) orally once a day (at bedtime)  Vascepa 1 g oral capsule: 1 cap(s) orally 2 times a day  Vitamin D3 50,000 intl units (1250 mcg) oral capsule: orally once a week  Zetia 10 mg oral tablet: 1 tab(s) orally once a day

## 2020-12-04 NOTE — DISCHARGE NOTE PROVIDER - CARE PROVIDER_API CALL
Mac Betts  COLON/RECTAL SURGERY  [address] [place] [address], [address]  [place] [address]  Phone: [phone]  Fax: [phone]  Follow Up Time: 1 week

## 2020-12-04 NOTE — DISCHARGE NOTE PROVIDER - HOSPITAL COURSE
79 year old male with hx of Diabetes Mellitus type 2 on insulin, Benign hypertension,  Lung Cancer,  diverticulitis states  bowel  blocked urine flow  admitted hospital stay 3-4 weeks ago tx IV antibiotics.  Now for surgery.  colonoscopy 11/30/2020 this am  for surgery 12/3.    1.  Loop Recorder placed 3 months ago to rule out a fib.  Dr. Vázquez [phone] called for recent report card on chart.  2.  PVD, Benign Hypertension.  Obtain most recent cardiac office note and studies if possible EKG in [place]  3.  Diabetes Mellitus type 2 Basaglar 28 units night before fingerstick on arrival     Pt underwent Enhanced Recovery After Surgery protocol robotic sigmoid resection with Dr. Betts on 12/3/20. Pt tolerated the procedure well. A Hartley catheter was placed in the OR that he will be discharged with. He was started on a liquid diet postoperatively, and his diet was advanced to low residue on POD 1. On POD 2 he began to pass gas and have bowel movements. On the day of discharge the patient was tolerating hemodynamically stable, tolerating a solid diet, and pain was controlled on oral medications.  He received 3 days of Ertapenum postoperatively for UTI and history of Klebsiella in his urine.    He will be discharged with a Hartley catheter and an incisional wound VAC that will be removed in the office. He will need to call to make an appointment to follow up with Dr. Betts on Wednesday 12/9/20.  Pt has CAPRINI Score = 7, he will be discharged with 30 days of Eliquis 2.5mg BID, as well as restart his home Aspirin.

## 2020-12-05 ENCOUNTER — TRANSCRIPTION ENCOUNTER (OUTPATIENT)
Age: 80
End: 2020-12-05

## 2020-12-05 LAB
ANION GAP SERPL CALC-SCNC: 10 MMOL/L — SIGNIFICANT CHANGE UP (ref 5–17)
ANION GAP SERPL CALC-SCNC: 8 MMOL/L — SIGNIFICANT CHANGE UP (ref 5–17)
BUN SERPL-MCNC: 25 MG/DL — HIGH (ref 7–23)
BUN SERPL-MCNC: 25 MG/DL — HIGH (ref 7–23)
CALCIUM SERPL-MCNC: 8.5 MG/DL — SIGNIFICANT CHANGE UP (ref 8.4–10.5)
CALCIUM SERPL-MCNC: 8.6 MG/DL — SIGNIFICANT CHANGE UP (ref 8.4–10.5)
CHLORIDE SERPL-SCNC: 104 MMOL/L — SIGNIFICANT CHANGE UP (ref 96–108)
CHLORIDE SERPL-SCNC: 105 MMOL/L — SIGNIFICANT CHANGE UP (ref 96–108)
CO2 SERPL-SCNC: 26 MMOL/L — SIGNIFICANT CHANGE UP (ref 22–31)
CO2 SERPL-SCNC: 26 MMOL/L — SIGNIFICANT CHANGE UP (ref 22–31)
CREAT SERPL-MCNC: 1.37 MG/DL — HIGH (ref 0.5–1.3)
CREAT SERPL-MCNC: 1.43 MG/DL — HIGH (ref 0.5–1.3)
GLUCOSE BLDC GLUCOMTR-MCNC: 107 MG/DL — HIGH (ref 70–99)
GLUCOSE BLDC GLUCOMTR-MCNC: 160 MG/DL — HIGH (ref 70–99)
GLUCOSE BLDC GLUCOMTR-MCNC: 179 MG/DL — HIGH (ref 70–99)
GLUCOSE BLDC GLUCOMTR-MCNC: 194 MG/DL — HIGH (ref 70–99)
GLUCOSE BLDC GLUCOMTR-MCNC: 63 MG/DL — LOW (ref 70–99)
GLUCOSE BLDC GLUCOMTR-MCNC: 75 MG/DL — SIGNIFICANT CHANGE UP (ref 70–99)
GLUCOSE BLDC GLUCOMTR-MCNC: 77 MG/DL — SIGNIFICANT CHANGE UP (ref 70–99)
GLUCOSE BLDC GLUCOMTR-MCNC: 78 MG/DL — SIGNIFICANT CHANGE UP (ref 70–99)
GLUCOSE BLDC GLUCOMTR-MCNC: 95 MG/DL — SIGNIFICANT CHANGE UP (ref 70–99)
GLUCOSE SERPL-MCNC: 198 MG/DL — HIGH (ref 70–99)
GLUCOSE SERPL-MCNC: 68 MG/DL — LOW (ref 70–99)
HCT VFR BLD CALC: 33.3 % — LOW (ref 39–50)
HGB BLD-MCNC: 10.5 G/DL — LOW (ref 13–17)
MAGNESIUM SERPL-MCNC: 2.5 MG/DL — SIGNIFICANT CHANGE UP (ref 1.6–2.6)
MCHC RBC-ENTMCNC: 27.1 PG — SIGNIFICANT CHANGE UP (ref 27–34)
MCHC RBC-ENTMCNC: 31.5 GM/DL — LOW (ref 32–36)
MCV RBC AUTO: 86 FL — SIGNIFICANT CHANGE UP (ref 80–100)
NRBC # BLD: 0 /100 WBCS — SIGNIFICANT CHANGE UP (ref 0–0)
PHOSPHATE SERPL-MCNC: 2.8 MG/DL — SIGNIFICANT CHANGE UP (ref 2.5–4.5)
PLATELET # BLD AUTO: 235 K/UL — SIGNIFICANT CHANGE UP (ref 150–400)
POTASSIUM SERPL-MCNC: 4.2 MMOL/L — SIGNIFICANT CHANGE UP (ref 3.5–5.3)
POTASSIUM SERPL-MCNC: 4.5 MMOL/L — SIGNIFICANT CHANGE UP (ref 3.5–5.3)
POTASSIUM SERPL-SCNC: 4.2 MMOL/L — SIGNIFICANT CHANGE UP (ref 3.5–5.3)
POTASSIUM SERPL-SCNC: 4.5 MMOL/L — SIGNIFICANT CHANGE UP (ref 3.5–5.3)
RBC # BLD: 3.87 M/UL — LOW (ref 4.2–5.8)
RBC # FLD: 16 % — HIGH (ref 10.3–14.5)
SODIUM SERPL-SCNC: 138 MMOL/L — SIGNIFICANT CHANGE UP (ref 135–145)
SODIUM SERPL-SCNC: 141 MMOL/L — SIGNIFICANT CHANGE UP (ref 135–145)
WBC # BLD: 5.89 K/UL — SIGNIFICANT CHANGE UP (ref 3.8–10.5)
WBC # FLD AUTO: 5.89 K/UL — SIGNIFICANT CHANGE UP (ref 3.8–10.5)

## 2020-12-05 RX ORDER — IBUPROFEN 200 MG
1 TABLET ORAL
Qty: 0 | Refills: 0 | DISCHARGE
Start: 2020-12-05

## 2020-12-05 RX ORDER — SODIUM,POTASSIUM PHOSPHATES 278-250MG
1 POWDER IN PACKET (EA) ORAL ONCE
Refills: 0 | Status: COMPLETED | OUTPATIENT
Start: 2020-12-05 | End: 2020-12-05

## 2020-12-05 RX ORDER — SODIUM CHLORIDE 9 MG/ML
1000 INJECTION, SOLUTION INTRAVENOUS ONCE
Refills: 0 | Status: COMPLETED | OUTPATIENT
Start: 2020-12-05 | End: 2020-12-05

## 2020-12-05 RX ORDER — OXYCODONE HYDROCHLORIDE 5 MG/1
1 TABLET ORAL
Qty: 10 | Refills: 0
Start: 2020-12-05

## 2020-12-05 RX ORDER — APIXABAN 2.5 MG/1
1 TABLET, FILM COATED ORAL
Qty: 60 | Refills: 0
Start: 2020-12-05 | End: 2021-01-03

## 2020-12-05 RX ORDER — ACETAMINOPHEN 500 MG
2 TABLET ORAL
Qty: 0 | Refills: 0 | DISCHARGE
Start: 2020-12-05

## 2020-12-05 RX ADMIN — INSULIN GLARGINE 35 UNIT(S): 100 INJECTION, SOLUTION SUBCUTANEOUS at 22:59

## 2020-12-05 RX ADMIN — ERTAPENEM SODIUM 120 MILLIGRAM(S): 1 INJECTION, POWDER, LYOPHILIZED, FOR SOLUTION INTRAMUSCULAR; INTRAVENOUS at 22:59

## 2020-12-05 RX ADMIN — Medication 1000 MILLIGRAM(S): at 13:04

## 2020-12-05 RX ADMIN — Medication 325 MILLIGRAM(S): at 13:05

## 2020-12-05 RX ADMIN — Medication 1000 MILLIGRAM(S): at 01:00

## 2020-12-05 RX ADMIN — Medication 2: at 13:03

## 2020-12-05 RX ADMIN — LISINOPRIL 2.5 MILLIGRAM(S): 2.5 TABLET ORAL at 05:59

## 2020-12-05 RX ADMIN — Medication 1000 MILLIGRAM(S): at 05:59

## 2020-12-05 RX ADMIN — CARVEDILOL PHOSPHATE 3.12 MILLIGRAM(S): 80 CAPSULE, EXTENDED RELEASE ORAL at 05:57

## 2020-12-05 RX ADMIN — TAMSULOSIN HYDROCHLORIDE 0.4 MILLIGRAM(S): 0.4 CAPSULE ORAL at 22:52

## 2020-12-05 RX ADMIN — ENOXAPARIN SODIUM 40 MILLIGRAM(S): 100 INJECTION SUBCUTANEOUS at 18:06

## 2020-12-05 RX ADMIN — Medication 400 MILLIGRAM(S): at 09:13

## 2020-12-05 RX ADMIN — Medication 1000 MILLIGRAM(S): at 06:00

## 2020-12-05 RX ADMIN — Medication 1000 MILLIGRAM(S): at 18:06

## 2020-12-05 RX ADMIN — SODIUM CHLORIDE 333.33 MILLILITER(S): 9 INJECTION, SOLUTION INTRAVENOUS at 09:08

## 2020-12-05 RX ADMIN — Medication 1000 MILLIGRAM(S): at 13:19

## 2020-12-05 RX ADMIN — Medication 1000 MILLIGRAM(S): at 18:08

## 2020-12-05 RX ADMIN — Medication 400 MILLIGRAM(S): at 09:06

## 2020-12-05 RX ADMIN — Medication 400 MILLIGRAM(S): at 13:05

## 2020-12-05 RX ADMIN — Medication 500 MILLIGRAM(S): at 13:04

## 2020-12-05 RX ADMIN — Medication 2: at 18:07

## 2020-12-05 RX ADMIN — Medication 400 MILLIGRAM(S): at 13:19

## 2020-12-05 RX ADMIN — Medication 1000 MILLIGRAM(S): at 23:30

## 2020-12-05 RX ADMIN — Medication 1000 MILLIGRAM(S): at 22:52

## 2020-12-05 RX ADMIN — Medication 1 TABLET(S): at 18:06

## 2020-12-05 NOTE — DISCHARGE NOTE NURSING/CASE MANAGEMENT/SOCIAL WORK - PATIENT PORTAL LINK FT
You can access the FollowMyHealth Patient Portal offered by [facility] by registering at the following website: [email or web address]. By joining "[employer]’s FollowMyHealth portal, you will also be able to view your health information using other applications (apps) compatible with our system.

## 2020-12-05 NOTE — PROGRESS NOTE ADULT - ASSESSMENT
78 yo M POD#2 s/p robotic assisted laparoscopic anterior resection of sigmoid for diverticulitis with associated colovesical fistula; afebrile and hemodynamically stable, with return of bowel function.    - continue akye in setting of colovesical fistula, anticipate removal in office with Dr. Betts next week  - maintain incisional JEANA vac over specimen extraction site, to be removed in office  - will evaluate for discharge this afternoon if patient continues to have bowel function    --PRICE Grajeda, [phone]

## 2020-12-05 NOTE — CHART NOTE - NSCHARTNOTEFT_GEN_A_CORE
I spoke with Mr. Nobles's primary care physician, Dr. Smith [phone]. He would like to discontinue Losartan and keep the patient on Lisinopril 2.5mg daily.    Leah Bojorquez, PGY-1  [phone]

## 2020-12-05 NOTE — PROGRESS NOTE ADULT - SUBJECTIVE AND OBJECTIVE BOX
Interval Events: Advanced to low fiber diet yesterday.    S: Patient doing well, denies fevers, chills, nausea, emesis, chest pain, SOB. Tolerating diet with no worsening pain. Reports feeling achy in abdomen, but well controlled. (-) flatus (+) BM, reports loose stool. Hartley remains in place.    O: Vital Signs  T(C): 36.7 (12-05 @ 05:54), Max: 36.9 (12-04 @ 13:25)  HR: 70 (12-05 @ 05:54) (58 - 81)  BP: 157/72 (12-05 @ 05:54) (101/55 - 157/72)  RR: 18 (12-05 @ 05:54) (18 - 18)  SpO2: 100% (12-05 @ 05:54) (96% - 100%)  12-04-20 @ 07:01  -  12-05-20 @ 07:00  --------------------------------------------------------  IN: 1000 mL / OUT: 920 mL / NET: 80 mL      General: alert and oriented, NAD  Resp: airway patent, respirations unlabored  CVS: regular  Abdomen: softly distended, nontender, incisional vac in place, minimal strikethrough  Extremities: trace edema  Skin: warm, dry, appropriate color                          10.6   7.84  )-----------( 257      ( 04 Dec 2020 06:50 )             33.9   12-04    132<L>  |  99  |  14  ----------------------------<  95  4.0   |  23  |  1.08    Ca    7.9<L>      04 Dec 2020 06:50  Phos  3.3     12-04  Mg     2.1     12-04

## 2020-12-06 VITALS
OXYGEN SATURATION: 97 % | DIASTOLIC BLOOD PRESSURE: 69 MMHG | HEART RATE: 80 BPM | RESPIRATION RATE: 18 BRPM | SYSTOLIC BLOOD PRESSURE: 142 MMHG | TEMPERATURE: 98 F

## 2020-12-06 LAB
ANION GAP SERPL CALC-SCNC: 10 MMOL/L — SIGNIFICANT CHANGE UP (ref 5–17)
BUN SERPL-MCNC: 23 MG/DL — SIGNIFICANT CHANGE UP (ref 7–23)
CALCIUM SERPL-MCNC: 8.4 MG/DL — SIGNIFICANT CHANGE UP (ref 8.4–10.5)
CHLORIDE SERPL-SCNC: 107 MMOL/L — SIGNIFICANT CHANGE UP (ref 96–108)
CO2 SERPL-SCNC: 23 MMOL/L — SIGNIFICANT CHANGE UP (ref 22–31)
CREAT SERPL-MCNC: 1.19 MG/DL — SIGNIFICANT CHANGE UP (ref 0.5–1.3)
GLUCOSE BLDC GLUCOMTR-MCNC: 149 MG/DL — HIGH (ref 70–99)
GLUCOSE BLDC GLUCOMTR-MCNC: 73 MG/DL — SIGNIFICANT CHANGE UP (ref 70–99)
GLUCOSE SERPL-MCNC: 62 MG/DL — LOW (ref 70–99)
HCT VFR BLD CALC: 33.7 % — LOW (ref 39–50)
HGB BLD-MCNC: 10.4 G/DL — LOW (ref 13–17)
MAGNESIUM SERPL-MCNC: 2.2 MG/DL — SIGNIFICANT CHANGE UP (ref 1.6–2.6)
MCHC RBC-ENTMCNC: 27.1 PG — SIGNIFICANT CHANGE UP (ref 27–34)
MCHC RBC-ENTMCNC: 30.9 GM/DL — LOW (ref 32–36)
MCV RBC AUTO: 87.8 FL — SIGNIFICANT CHANGE UP (ref 80–100)
NRBC # BLD: 0 /100 WBCS — SIGNIFICANT CHANGE UP (ref 0–0)
PHOSPHATE SERPL-MCNC: 3 MG/DL — SIGNIFICANT CHANGE UP (ref 2.5–4.5)
PLATELET # BLD AUTO: 239 K/UL — SIGNIFICANT CHANGE UP (ref 150–400)
POTASSIUM SERPL-MCNC: 4.4 MMOL/L — SIGNIFICANT CHANGE UP (ref 3.5–5.3)
POTASSIUM SERPL-SCNC: 4.4 MMOL/L — SIGNIFICANT CHANGE UP (ref 3.5–5.3)
RBC # BLD: 3.84 M/UL — LOW (ref 4.2–5.8)
RBC # FLD: 16 % — HIGH (ref 10.3–14.5)
SODIUM SERPL-SCNC: 140 MMOL/L — SIGNIFICANT CHANGE UP (ref 135–145)
WBC # BLD: 6.45 K/UL — SIGNIFICANT CHANGE UP (ref 3.8–10.5)
WBC # FLD AUTO: 6.45 K/UL — SIGNIFICANT CHANGE UP (ref 3.8–10.5)

## 2020-12-06 RX ORDER — LISINOPRIL 2.5 MG/1
1 TABLET ORAL
Qty: 25 | Refills: 0
Start: 2020-12-06

## 2020-12-06 RX ORDER — LOSARTAN POTASSIUM 100 MG/1
0.5 TABLET, FILM COATED ORAL
Qty: 0 | Refills: 0 | DISCHARGE

## 2020-12-06 RX ADMIN — Medication 1000 MILLIGRAM(S): at 11:49

## 2020-12-06 RX ADMIN — Medication 1000 MILLIGRAM(S): at 07:00

## 2020-12-06 RX ADMIN — Medication 325 MILLIGRAM(S): at 11:49

## 2020-12-06 RX ADMIN — CILOSTAZOL 50 MILLIGRAM(S): 100 TABLET ORAL at 06:18

## 2020-12-06 RX ADMIN — Medication 1000 MILLIGRAM(S): at 12:19

## 2020-12-06 RX ADMIN — Medication 500 MILLIGRAM(S): at 11:49

## 2020-12-06 RX ADMIN — Medication 1000 MILLIGRAM(S): at 06:18

## 2020-12-06 RX ADMIN — CARVEDILOL PHOSPHATE 3.12 MILLIGRAM(S): 80 CAPSULE, EXTENDED RELEASE ORAL at 06:18

## 2020-12-06 NOTE — PROGRESS NOTE ADULT - ATTENDING COMMENTS
priya clears, no pain, + ambulating    aaox3  abd soft, NT/ND, dressings c/d/i    s/p robotic LAR  kaye for 5 days for colovesicle fistula  abx per ID  OOB  Low res diet, await bowel function
s/p takedown of colovesical fistula  -f/u labs  -f/u cards  -dvt ppx  -pain control  -dispo planning
Rose Loaiza M.D. ,   Pager [phone]     after 5PM/ weekends [phone]      ID service will be covering over the weekend. Please call for acute issues or questions. [phone]
s/p takedown of colovesical fistula  -LRD  -oob  -await flatus  -dvt ppx  -dispo planning w/ leg bag

## 2020-12-06 NOTE — PROGRESS NOTE ADULT - ASSESSMENT
78 yo M POD#3 s/p robotic assisted laparoscopic anterior resection of sigmoid for diverticulitis with associated colovesical fistula; afebrile and hemodynamically stable, with return of bowel function.    Plan:  - follow up AM creatinine, increase yesterday likely prerenal  - await flatus  - f/u with pts cardiologist, Dr. Squires, regarding Lisinopril and Losartan use  - continue kaye in setting of colovesical fistula, anticipate removal in office with Dr. Betts next week  - maintain incisional JEANA vac over specimen extraction site, to be removed in office  - possible dc home today    [facility] [phone] 78 yo M POD#3 s/p robotic assisted laparoscopic anterior resection of sigmoid for diverticulitis with associated colovesical fistula; afebrile and hemodynamically stable, with return of bowel function.    Plan:  - follow up AM creatinine, increase yesterday likely prerenal  - await flatus  - f/u with pts cardiologist, Dr. Squires, regarding Lisinopril and Losartan use: C/W Lisinopril  - continue kaye in setting of colovesical fistula, anticipate removal in office with Dr. Betts next week  - maintain incisional JEANA vac over specimen extraction site, to be removed in office  - Plan to dispo home today    [facility] [phone] 78 yo M POD#3 s/p robotic assisted laparoscopic anterior resection of sigmoid for diverticulitis with associated colovesical fistula; afebrile and hemodynamically stable, with return of bowel function.    Plan:  - follow up AM creatinine, increase yesterday likely prerenal: 1.37 decrease to 1.19  - + Flatus this mroning  - f/u with pts cardiologist, Dr. Squires, regarding Lisinopril and Losartan use: C/W Lisinopril on dicharge  - continue kaye in setting of colovesical fistula, anticipate removal in office with Dr. Betts next week  - maintain incisional JEANA vac over specimen extraction site, to be removed in office  - Plan to dispo home today    [facility] [phone]

## 2020-12-06 NOTE — PROGRESS NOTE ADULT - SUBJECTIVE AND OBJECTIVE BOX
Surgery Progress Note    SUBJECTIVE: Pt seen and examined at bedside. Patient comfortable and in no-apparent distress. Tolerating LRD without nausea or vomiting. *flatus/+BM.     Vital Signs Last 24 Hrs  T(C): 36.7 (06 Dec 2020 01:02), Max: 36.7 (05 Dec 2020 05:54)  T(F): 98.1 (06 Dec 2020 01:02), Max: 98.1 (06 Dec 2020 01:02)  HR: 74 (06 Dec 2020 01:02) (56 - 74)  BP: 148/72 (06 Dec 2020 01:02) (124/61 - 170/70)  BP(mean): --  RR: 18 (06 Dec 2020 01:02) (18 - 18)  SpO2: 99% (06 Dec 2020 01:02) (95% - 100%)    Physical Exam:  General Appearance: Appears well, NAD  Respiratory: No labored breathing  CV: Pulse regularly present  Abdomen: Soft, nontender, nondistended; incision with dressings c/d/i    LABS:                        10.5   5.89  )-----------( 235      ( 05 Dec 2020 07:13 )             33.3     12-05    138  |  104  |  25<H>  ----------------------------<  198<H>  4.5   |  26  |  1.37<H>    Ca    8.5      05 Dec 2020 15:54  Phos  2.8     12-05  Mg     2.5     12-05            INs and OUTs:    12-04-20 @ 07:01  -  12-05-20 @ 07:00  --------------------------------------------------------  IN: 1000 mL / OUT: 920 mL / NET: 80 mL    12-05-20 @ 07:01  -  12-06-20 @ 04:48  --------------------------------------------------------  IN: 1120 mL / OUT: 500 mL / NET: 620 mL     Surgery Progress Note    SUBJECTIVE: Pt seen and examined at bedside. Patient comfortable and in no-apparent distress. Tolerating LRD without nausea or vomiting. +flatus/-BM.     Vital Signs Last 24 Hrs  T(C): 36.7 (06 Dec 2020 01:02), Max: 36.7 (05 Dec 2020 05:54)  T(F): 98.1 (06 Dec 2020 01:02), Max: 98.1 (06 Dec 2020 01:02)  HR: 74 (06 Dec 2020 01:02) (56 - 74)  BP: 148/72 (06 Dec 2020 01:02) (124/61 - 170/70)  BP(mean): --  RR: 18 (06 Dec 2020 01:02) (18 - 18)  SpO2: 99% (06 Dec 2020 01:02) (95% - 100%)    Physical Exam:  General Appearance: Appears well, NAD  Respiratory: No labored breathing  CV: Pulse regularly present  Abdomen: Soft, nontender, nondistended; incision with dressings c/d/i    LABS:                        10.5   5.89  )-----------( 235      ( 05 Dec 2020 07:13 )             33.3     12-05    138  |  104  |  25<H>  ----------------------------<  198<H>  4.5   |  26  |  1.37<H>    Ca    8.5      05 Dec 2020 15:54  Phos  2.8     12-05  Mg     2.5     12-05            INs and OUTs:    12-04-20 @ 07:01  -  12-05-20 @ 07:00  --------------------------------------------------------  IN: 1000 mL / OUT: 920 mL / NET: 80 mL    12-05-20 @ 07:01  -  12-06-20 @ 04:48  --------------------------------------------------------  IN: 1120 mL / OUT: 500 mL / NET: 620 mL

## 2020-12-07 ENCOUNTER — NON-APPOINTMENT (OUTPATIENT)
Age: 80
End: 2020-12-07

## 2020-12-07 PROBLEM — Z98.890 OTHER SPECIFIED POSTPROCEDURAL STATES: Chronic | Status: ACTIVE | Noted: 2020-11-30

## 2020-12-07 PROBLEM — Z87.19 PERSONAL HISTORY OF OTHER DISEASES OF THE DIGESTIVE SYSTEM: Chronic | Status: ACTIVE | Noted: 2020-11-30

## 2020-12-07 PROBLEM — M54.9 DORSALGIA, UNSPECIFIED: Chronic | Status: ACTIVE | Noted: 2020-11-30

## 2020-12-07 PROBLEM — N39.0 URINARY TRACT INFECTION, SITE NOT SPECIFIED: Chronic | Status: ACTIVE | Noted: 2020-11-30
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
## 2020-12-07 PROBLEM — C34.91 MALIGNANT NEOPLASM OF UNSPECIFIED PART OF RIGHT BRONCHUS OR LUNG: Chronic | Status: ACTIVE | Noted: 2020-11-30

## 2020-12-07 PROBLEM — Z86.79 PERSONAL HISTORY OF OTHER DISEASES OF THE CIRCULATORY SYSTEM: Chronic | Status: ACTIVE | Noted: 2020-11-30

## 2020-12-07 PROBLEM — M19.90 UNSPECIFIED OSTEOARTHRITIS, UNSPECIFIED SITE: Chronic | Status: ACTIVE | Noted: 2020-11-30

## 2020-12-07 LAB — SURGICAL PATHOLOGY STUDY: SIGNIFICANT CHANGE UP

## 2020-12-08 ENCOUNTER — NON-APPOINTMENT (OUTPATIENT)
Age: 80
End: 2020-12-08

## 2020-12-09 ENCOUNTER — APPOINTMENT (OUTPATIENT)
Dept: COLORECTAL SURGERY | Facility: CLINIC | Age: 80
End: 2020-12-09
Payer: MEDICARE

## 2020-12-09 VITALS
OXYGEN SATURATION: 98 % | SYSTOLIC BLOOD PRESSURE: 131 MMHG | DIASTOLIC BLOOD PRESSURE: 69 MMHG | TEMPERATURE: 97.1 F | HEART RATE: 84 BPM

## 2020-12-09 DIAGNOSIS — N32.1 VESICOINTESTINAL FISTULA: ICD-10-CM

## 2020-12-09 PROCEDURE — 99024 POSTOP FOLLOW-UP VISIT: CPT

## 2020-12-09 NOTE — CONSULT LETTER
[Dear  ___] : Dear  [unfilled], [Courtesy Letter:] : I had the pleasure of seeing your patient, [unfilled], in my office today. [Please see my note below.] : Please see my note below. [Sincerely,] : Sincerely, [FreeTextEntry3] : Mac Betts MD, FACS, FASCRS\par Colorectal Surgery\par [facility]\par Assistant Professor of Surgery [employer] at [facility]\par [address] [place] [address], [address]\[place] [address]\par Tel: [phone] \par Cell: [phone] \par Email: [email or web address]

## 2020-12-09 NOTE — HISTORY OF PRESENT ILLNESS
[FreeTextEntry1] : The patient feels well with no pain. Slight soreness with coughing and sneezing. Loose BMs daily. + flatus , eating well. Hardy still in

## 2020-12-09 NOTE — PHYSICAL EXAM
[Exam Deferred] : exam was deferred [Normal Breath Sounds] : Normal breath sounds [Wheezing] : no wheezing was heard [Normal Heart Sounds] : normal heart sounds [Normal Rate and Rhythm] : normal rate and rhythm [Alert] : alert [Oriented to Person] : oriented to person [Oriented to Place] : oriented to place [Oriented to Time] : oriented to time [Calm] : calm [de-identified] : soft, NT/ND, incisions c/d/i with staples that are removed today [de-identified] : NAD [de-identified] : NCAT [de-identified] : supple [de-identified] : normal ROM [de-identified] : warm

## 2020-12-09 NOTE — ASSESSMENT
[FreeTextEntry1] : pt healing well\par kaye removed today in the office, pt advised to go to the ER if unable to urinate in the next 6 hours\par resume high fiber diet\par start fiber supp daily\par f/u in 1 month

## 2020-12-28 PROCEDURE — 84100 ASSAY OF PHOSPHORUS: CPT

## 2020-12-28 PROCEDURE — 87186 SC STD MICRODIL/AGAR DIL: CPT

## 2020-12-28 PROCEDURE — 86901 BLOOD TYPING SEROLOGIC RH(D): CPT

## 2020-12-28 PROCEDURE — G0463: CPT

## 2020-12-28 PROCEDURE — 86850 RBC ANTIBODY SCREEN: CPT

## 2020-12-28 PROCEDURE — 83036 HEMOGLOBIN GLYCOSYLATED A1C: CPT

## 2020-12-28 PROCEDURE — 80048 BASIC METABOLIC PNL TOTAL CA: CPT

## 2020-12-28 PROCEDURE — C1889: CPT

## 2020-12-28 PROCEDURE — C1769: CPT

## 2020-12-28 PROCEDURE — 87086 URINE CULTURE/COLONY COUNT: CPT

## 2020-12-28 PROCEDURE — 86900 BLOOD TYPING SEROLOGIC ABO: CPT

## 2020-12-28 PROCEDURE — 83735 ASSAY OF MAGNESIUM: CPT

## 2020-12-28 PROCEDURE — 85027 COMPLETE CBC AUTOMATED: CPT

## 2020-12-28 PROCEDURE — 85025 COMPLETE CBC W/AUTO DIFF WBC: CPT

## 2020-12-28 PROCEDURE — 88307 TISSUE EXAM BY PATHOLOGIST: CPT

## 2020-12-28 PROCEDURE — U0003: CPT

## 2020-12-28 PROCEDURE — 82962 GLUCOSE BLOOD TEST: CPT

## 2020-12-28 PROCEDURE — S2900: CPT

## 2020-12-28 PROCEDURE — C1758: CPT

## 2020-12-28 PROCEDURE — C9399: CPT

## 2021-01-13 ENCOUNTER — APPOINTMENT (OUTPATIENT)
Dept: COLORECTAL SURGERY | Facility: CLINIC | Age: 81
End: 2021-01-13
Payer: MEDICARE

## 2021-01-13 VITALS — TEMPERATURE: 97 F

## 2021-01-13 DIAGNOSIS — Z87.19 PERSONAL HISTORY OF OTHER DISEASES OF THE DIGESTIVE SYSTEM: ICD-10-CM

## 2021-01-13 PROCEDURE — 99024 POSTOP FOLLOW-UP VISIT: CPT

## 2021-01-13 NOTE — HISTORY OF PRESENT ILLNESS
[FreeTextEntry1] : The patient reports good PO intake and normal soft BMs but a total of 12lb weight loss since surgery. He lost most of it in the first couple of weeks postop and has since leveled out but is still being w/u by his PCP for the weight loss. He has no abd pain and no symptoms of a UTI. He does report issues with interrupted stream.

## 2021-01-13 NOTE — ASSESSMENT
[FreeTextEntry1] : Pt will f/u with his PCP for bloodwork results for weight loss and for urinary c/o\par he will cont with good PO intake and add Ensure shakes\par Colonoscopy in 5 years\par f/u here PRN

## 2021-01-13 NOTE — PHYSICAL EXAM
[Exam Deferred] : exam was deferred [Normal Breath Sounds] : Normal breath sounds [Wheezing] : no wheezing was heard [Normal Heart Sounds] : normal heart sounds [Normal Rate and Rhythm] : normal rate and rhythm [Alert] : alert [Oriented to Person] : oriented to person [Oriented to Place] : oriented to place [Oriented to Time] : oriented to time [Calm] : calm [de-identified] : soft, NT/ND, incisions well healed without hernia [de-identified] : NAD [de-identified] : NCAT [de-identified] : supple [de-identified] : normal ROM [de-identified] : warm

## 2021-01-13 NOTE — CONSULT LETTER
[Dear  ___] : Dear  [unfilled], [Courtesy Letter:] : I had the pleasure of seeing your patient, [unfilled], in my office today. [Please see my note below.] : Please see my note below. [Sincerely,] : Sincerely, [FreeTextEntry3] : Mac Betts MD, FACS, FASCRS\par Colorectal Surgery\par [facility]\par Assistant Professor of Surgery [employer] at [facility]\par [address] [place] [address], [address]\[place] [address]\par Tel: [phone] \par Cell: [phone] \par Email: [email or web address]\par

## 2021-07-20 NOTE — H&P PST ADULT - NSALCOHOLTYPE_GEN__A_CORE_SD
[Stable] : stable [0] : currently ~his/her~ pain is 0 out of 10 [None] : No exacerbating factors are noted [Rest] : relieved by rest [FreeTextEntry1] : Sravan is a 16 years old male who presents with his father for follow up of left distal radius fracture sustained 4/28/21. He was racing with his friends when he slipped on the grass and fell onto his outstretched left hand injuring it. He was seen at [facility] ED where he had XR left forearm performed demonstrating distal radius fracture. He was closed reduced and placed in a LAC. On 5/17, his LAC was removed and transition to wrist immobilizer. At last visit on 6/7/21, patients wrist immobilizer was discontinued and he was advised to work on ROM of the wrist. \par Today, he presents with his father for follow up. He is doing well. He states he has been participating in tennis without any issues. He has gained back full ROM of the wrist. Denies any current wrist pain. Denies any need for pain medication. Denies any radiating pain, numbness or any tingling sensation. Here for continued orthopaedic evaluation and management.\par \par The past medical history, family history, medications, and allergies were reviewed today and are unchanged from the last clinic visit. No recent illnesses or hospitalizations.\par  wine

## 2021-09-04 NOTE — DISCHARGE NOTE PROVIDER - NSDCHHCONTRAHOME_GEN_ALL_CORE
Wound - risk of deterioration/infection/Activity restrictions due to illness/surgery/Infection
Name band;

## 2022-02-09 NOTE — PATIENT PROFILE ADULT - NUMBER OF YRS
2/9/2022      Carmen Christopher  [address] [facility] [address]    Dear MsRashi Ashwin,      Your procedure is scheduled with Dr. Ry Morales on February 17, 2022 at:    [facility] [address] [place] [address]  [phone]    You can expect to be contacted 1 to 3 days prior to the surgery to confirm arrival and surgery time. These times may change due to various OR schedule needs. We will call you ASAP if this happens.    The following appointment(s) have been scheduled for you:     • Pre-Procedure / Pre-Surgery COVID-19 Testing:  We have you scheduled for your COVID-19 Testing Visit on:  February 14, 2022 at 1:10 pm  for our ACL Lab site located at [place]  ([address] [place] [address]).    If you need to reschedule this appointment, please call our office ASAP for assistance.    • Please remember the following instructions for after your test and before surgery:  Once you leave the testing area, immediately go home and remain home until the day of your procedure to reduce your risk of any new exposure.  It is also recommended that members of your household limit their outside contact until your procedure.      Failure to self-isolate during this timeframe increases your risk of new COVID-19 exposure and might result in cancellation of your procedure.  If you are in need of a work excuse, please contact our office ASAP for assistance.     • Post-op with FERNANDO Moreira  at the [facility] ([address] [place] [address]) on March 7, 2022 at 11:15 am.                To better prepare for your surgery, please follow these instructions:    • Please contact your primary care physician to schedule a preoperative History & Physical and any Pre-op Testing 2-3 weeks prior to surgery.     o Please remember to go over any medications you may be taking, as your primary doctor will be the one to instruct you on what you can take and what should  be discontinued prior to surgery.    o If on any blood thinner, please discus with your primary doctor on what steps need to be done prior to surgery       • Please STOP taking any aspirin products or prescriptive/over-the-counter anti-inflammatory medication (such as ibuprofen) or blood thinners 5-7 days before your scheduled surgery, or per PCP recommendations.  (Tylenol products are okay to take for pain control. )     • Any prescription medications should be double-checked as soon as possible with the ordering doctor(s) for any changes or instructions prior to your surgery.  Failure to do so may result in your physician(s) needing to postpone your surgery.    • Please STOP Phentermine or other diet/weight loss products for 10-14 days prior to surgery. Continuing these medications within the 10 days prior to surgery will result in postponement due to anesthesia complications to these medications.  Please consult with your prescribing physicians if you have any questions.     • Do not eat or drink after midnight the night before your surgery.      • You will need someone to drive you home and remain with you up to 24 hours after you have been discharged.    • You will need to use crutches/walker following your procedure for balance.  Please be sure to bring crutches/walker to the procedure.      Post-Operative Equipment Ordered for you by your surgeon:    - Post-Op Brace    Arrangement for your post-op equipment will be made by: [employer] - Ph: [phone]    This company will be calling you soon to discuss delivery/fitting/set-up and education for use of the ordered equipment.  Delivery will be scheduled for 3-5 days before your surgery.  They will work directly with your insurance company for authorization/coverage and will discuss billing/out-of-pocket expenses with you directly.  Please return this call ASAP so necessary arrangements can be made, as well as insurance coverage verification.    · If you  have questions or do not hear from them at least 3 days before your surgery, please contact them directly at the number above.         If you have any work related and/or disability forms that need to be completed, please contact the Forms Completion Department at [phone]. Forms can be dropped off at any of our [facility] locations. Please be advised that it can take 7 to 10 business days to complete these requests.              If you have questions regarding the procedure, medications, rehab, etc., please contact the nursing staff at [phone] ([facility]).    If you have any scheduling questions or need to reschedule, please contact me at the telephone number and extension listed below.       Thank you,  Gale at [phone]  Surgery Scheduler for Dr. Ry Morales   [facility]          \"Help us grow our quality of service. We want to improve - and you can help us. You may receive a survey either in the mail or via e-mail. This is your opportunity to tell us what we did well, and where we could use some improvement. We value your input.\"                                                            Insurance Authorization Need to Know’s    Prior to your surgical procedure, our team will contact your Insurance Company to initiate a PreAuthorization request.      This is not a guarantee of payment from your insurance company, but rather a step taken to ensure that we have all of the information and documentation for them to confirm the procedure is one that is eligible for coverage under your plan.    We will contact you if we either need more information from you to fulfill the requirements of your insurance company, or if we need to discuss any concerns that may lead to postponement or cancellation of your procedure. If you have any questions regarding your surgery authorization, please check with your insurance company or call our office for an update.    If you need information  regarding your level of benefits or out-of-pocket expenses, please contact your insurance company directly.  They can also confirm for you whether or not we (the surgeon and the hospital/surgery center) are in your plan’s preferred network (aka ‘in-network’).    What to do if… My Insurance Changes:  If, at any time, your insurance company, plan or even card changes… Please call our office so that our team can be sure to update your records.  We will need to make sure to submit any PreAuth or jorden to the correct, up-to-date insurance plan.      What to do if… My Insurance Requires A Referral:  If your insurance company requires a Referral for Specialty Care or to see a Specialist, you will need to confirm with them if you have one on file.    - If your insurance carrier does not have a referral, then you will need to contact your Primary Care Physician to have one directly submitted to your insurance company ASAP.    - Without a referral on file, your insurance company will not Pre-Authorize your surgery and may not cover any of your care with our specialty.    What to do if… I have a Workers Compensation (W/C) Claim:  If you have a W/C claim, please be sure to provide our reception team with the information you have regarding your claim ASAP.  We will contact your W/C carrier/adjustor to inform them of your upcoming surgery and check the status of your claim (open vs closed).  We will let you know if they advise of any concerns or issues with your claim.  - Even if you have an open W/C claim, please also provide us with your personal/family insurance.  We will want to be sure this plan is loaded into your account.  We always PreAuthorize with personal insurance as a back-up to W/C.  Otherwise, if W/C doesn’t cover something along the way, you will receive a bill for the services.    What to do if… I have Month-to-Month Coverage/Premiums:  If you have an insurance plan that is paid for month to month, or is  subject to plan change on a monthly basis, please be aware we cannot initiate PreAuthorization until just before the month of your surgery, as your insurance company will need to verify your premium payments/eligibility first.    What to do if… I Do Not Have Insurance Coverage or Have other Insurance/Billing Questions:  Please call our Patient Contact Center:  [phone] to speak with a team member about your billing needs, including possible coverage options, setting up payment plans, our fee schedule, etc.        ASPIRIN and NSAID PRODUCTS  The following is a list of medications that either contain aspirin or nonsteroidal anti-inflammatory agents (NSAID's). Please do not take these medications.    OVER-THE-COUNTER:  Do not take SEVEN (7) days prior to procedure.  Do not take for two (2) weeks after procedure.  · Aspirin (generic):  Brand Names:  Anacin, Guanakito, Saint Agatha, Aspergum, Aspercin, Aspermin, Aspertab, Back-quell, Duradyne, Empirin, Gemnisyn, Genprin, Gensan, Magnaprin, McNess Pain Tab, Momentum, P-A-C, Pain Reliever Tabs, Tri-Pain Caplets, Vanquish Caplet.  · Buffered Aspirin (generic):  Brand Names:  Ascriptin, Bufferin, Ecotrin, Buffaprin, Buffasal, Buffinol, COPE.  · BC Powders/Tablets  · Goodys Powders  · Stanback Powders or Tablets  · Excedrin, Excedrin Extra, Excedrin IB  · Luh Martin or Bromoseltzer  · Ibuprofen (generic):  Brand Names:  Advil, Nuprin, Motrin IB, Addapin, Genpril, Ibufen 200, Menadol, Midol IB, Dristan Sinus, Ursinus Inlay Tabs, Dimetapp Sinus, Valprin, Haltran Tabs, Colt's Pills, Adonis.  · Aspirin Suppositories (generic, any strength)  · Naproxen (generic)  Brand Name: Aleve  · Pepto Bismol    PRESCRIPTION:  Brand Name Generic Name    Fiorinal  butalbital, aspirin, caffeine    Naprosyn, Anaprox  naproxen    Voltaren, Cataflam  diclofenac    Feldene  piroxicam    Motrin (Rx), Rufen  ibuprofen    Ansaid  flurbiprofen    Orudis  ketoprofen    Dolobid  diflunisol    Clinoril   sulindac    Indocin  indomethacin    Tolectin  tolmetin    Azdone Tabs  aspirin plus hydrocodone    Percodan  aspirin plus oxycodone    Synalgos  aspirin plus dihydrocodeine    Daypro  oxaprozin    Lodine  etodolac    Meclomen  meclofenamate    Nalfon  fenoprofen    Ponstel (mefenamic acid)     Relafen  nabumetone   Toradol ketorolac   Mobic  meloxicam   If you have a headache, backache, arthritis or other painful condition, please take Tylenol, Datril or some other non aspirin-containing product.  Preparing Your Skin Before Surgery  Surgical Site Infections are very serious.  The Centers for Disease Control (CDC) strongly recommends taking showers before surgery with an antimicrobial soap such as Chlorhexidine to decrease the chance of an infection. Shower with an antimicrobial soap (Chlorhexidine 4% is recommended) the night before and the morning of surgery.  Chlorhexidine based soap such as Hibiclens can be used as well. There are also several simple things you can do at home that can help decrease the chance of an infection.     • Do not shave near the area of your body where you are having surgery for at least 48 hours before surgery.  • Remove all jewelry the night before your surgery.  This includes rings, earrings and body piercings. Leave jewelry off until after your surgery.    • Remove all makeup, including lipstick and all nail polish (fingers & toes) before showering.  • Use only freshly laundered towels and bed sheets the night before surgery and morning of surgery. Put on freshly laundered clothes after showering.   • Do not apply any lotion, powder, perfume, makeup, nail polish, hair gels, hairspray or deodorant after bathing. (i.e. mascara, eye shadow, hair pins, or lipstick)    Instructions for showering    Shower the night before surgery  1. Wet your body and hair with warm water.  2. Using regular shampoo, wash your hair to remove any oil, gel, mousse, hair spray, etc. Rinse thoroughly.  3. Wash  your face with regular facial cleanser. Rinse thoroughly. These steps are done first so that the Chlorhexidine soap isn't washed off by your shampoo or face wash.   4. Turn the water off.  5. Use the cleansing product you were told to use. Examples include:  • Chlorhexidine (Hibiclens) 4oz bottle - use 2oz or ½ of the bottle per shower the night before and the morning of surgery.  5. Apply the Chlorhexidine to your entire body from your shoulders down (including under your arms, behind your knees, your groin area and between any skin folds.). Avoid contact with your eyes, ears and mouth.  The soap will not bubble or lather very much, and that is fine.   6. Let the soap stay on your skin for three minutes.  7. Rinse your body thoroughly with warm water.  Do not wash with any other soap or cleanser.  8. Dry your body with a clean freshly laundered towel. You may use a hair dryer to dry your hair. Put on freshly laundered nightclothes and have freshly laundered linens on your bed.    Shower the morning of surgery  1. Repeat steps 1-6 above.  2. Dry your body with a clean freshly laundered towel. You may use a hair dryer to dry your hair. Put on freshly laundered clothes. Do not apply any lotion, powder, perfume, makeup, nail polish, hair gels, hairspray or deodorant after bathing. (i.e. mascara, eye shadow, hair pins, or lipstick)      Chlorhexidine Gluconate 4%   • It can be purchased at your local pharmacy including [facility], [facility] and [employer].  Generics are available.   • Can also be picked up at any [facility] Orth[facility] dept           Understanding your health   just got easier  Watch your Lea® program today!    What is Lea?  Lea is a series of free, online programs that walk you through important information about a health topic, condition or procedure.     Learn more about your health  Healthcare providers try to explain everything about your health but sometimes it gets  confusing. Lea® programs help to answer your questions and make you feel more at ease. You are the most important member of your health care team, so you should have all the information you need.    What to Expect  Lea programs are easy to use. Your healthcare   provider has prescribed you programs to help   you better understand your upcoming procedure/appointment. You can view Lea   programs on any device with internet access, watch   it as many times as you like and share it with your   family and friends.        Ready to get started?  Check your e-mail for an 11 digit access code to watch your Lea program.   Contact Lea at [email or web address] or [phone] if you have any questions.                  Ready to get started?  Check your e-mail for an 11 digit access code to watch your Lea program.   Contact Lea at [email or web address] or [phone] if you have any questi        30

## 2023-01-10 ENCOUNTER — APPOINTMENT (OUTPATIENT)
Dept: GASTROENTEROLOGY | Facility: CLINIC | Age: 83
End: 2023-01-10
Payer: MEDICARE

## 2023-01-10 VITALS
RESPIRATION RATE: 12 BRPM | DIASTOLIC BLOOD PRESSURE: 60 MMHG | OXYGEN SATURATION: 99 % | SYSTOLIC BLOOD PRESSURE: 136 MMHG | WEIGHT: 138 LBS | BODY MASS INDEX: 19.76 KG/M2 | TEMPERATURE: 97.8 F | HEART RATE: 52 BPM | HEIGHT: 70 IN

## 2023-01-10 DIAGNOSIS — Z85.118 PERSONAL HISTORY OF OTHER MALIGNANT NEOPLASM OF BRONCHUS AND LUNG: ICD-10-CM

## 2023-01-10 DIAGNOSIS — Z86.79 PERSONAL HISTORY OF OTHER DISEASES OF THE CIRCULATORY SYSTEM: ICD-10-CM

## 2023-01-10 DIAGNOSIS — Z86.73 PERSONAL HISTORY OF TRANSIENT ISCHEMIC ATTACK (TIA), AND CEREBRAL INFARCTION W/OUT RESIDUAL DEFICITS: ICD-10-CM

## 2023-01-10 DIAGNOSIS — Z86.19 PERSONAL HISTORY OF OTHER INFECTIOUS AND PARASITIC DISEASES: ICD-10-CM

## 2023-01-10 DIAGNOSIS — A04.72 ENTEROCOLITIS DUE TO CLOSTRIDIUM DIFFICILE, NOT SPECIFIED AS RECURRENT: ICD-10-CM

## 2023-01-10 DIAGNOSIS — K52.9 NONINFECTIVE GASTROENTERITIS AND COLITIS, UNSPECIFIED: ICD-10-CM

## 2023-01-10 DIAGNOSIS — Z86.39 PERSONAL HISTORY OF OTHER ENDOCRINE, NUTRITIONAL AND METABOLIC DISEASE: ICD-10-CM

## 2023-01-10 PROCEDURE — 99205 OFFICE O/P NEW HI 60 MIN: CPT

## 2023-01-10 RX ORDER — HYDRALAZINE HYDROCHLORIDE 25 MG/1
25 TABLET ORAL
Refills: 0 | Status: ACTIVE | COMMUNITY

## 2023-01-10 RX ORDER — METOPROLOL TARTRATE 50 MG/1
50 TABLET, FILM COATED ORAL
Refills: 0 | Status: ACTIVE | COMMUNITY

## 2023-01-10 RX ORDER — CLOPIDOGREL 75 MG/1
75 TABLET, FILM COATED ORAL
Refills: 0 | Status: ACTIVE | COMMUNITY

## 2023-01-10 NOTE — CONSULT LETTER
[Dear  ___] : Dear  [unfilled], [Consult Letter:] : I had the pleasure of evaluating your patient, [unfilled]. [( Thank you for referring [unfilled] for consultation for _____ )] : Thank you for referring [unfilled] for consultation for [unfilled] [Please see my note below.] : Please see my note below. [Consult Closing:] : Thank you very much for allowing me to participate in the care of this patient.  If you have any questions, please do not hesitate to contact me. [Sincerely,] : Sincerely, [FreeTextEntry3] : Cristobal Tang MD\par \par Gastroenterology\par [facility]\par [facility]\par \par

## 2023-01-10 NOTE — ASSESSMENT
[FreeTextEntry1] : R/O recurrent C' diff infection versus diarrhea from half his colon removed. \par \par I gave him a stool kit to test his stool or C'diff and C & S\par I told him to take Lomotil prophylactically 1 capsule before breakfast and again every 4 hours  as needed.\par \par Office f/u in 2 to 3 weeks

## 2023-01-10 NOTE — HISTORY OF PRESENT ILLNESS
[FreeTextEntry1] : He is an 82 year old male  with Stage 4 Lung  CA who was admitted o [facility] in early November with an intestinal obstruction. .He had surgery resulting  in a right hemicolectomy. He developed C' diff following this and was treated with Dificid  for 10 days after he was discharged. He initially felt better but his diarrhea returned and he was readmitted Dec 8th with a recurrence of C' diff and COVID. He stayed in the hospital until Dec 28th  he is feeling better now but still has 4 to 5 bowel movements/ day  \par

## 2023-01-12 RX ORDER — DIPHENOXYLATE HYDROCHLORIDE AND ATROPINE SULFATE 2.5; .025 MG/1; MG/1
2.5-0.025 TABLET ORAL 4 TIMES DAILY
Qty: 120 | Refills: 0 | Status: ACTIVE | COMMUNITY
Start: 2023-01-12 | End: 1900-01-01

## 2023-10-11 NOTE — DISCHARGE NOTE NURSING/CASE MANAGEMENT/SOCIAL WORK - NSDCPETBCESMAN_GEN_ALL_CORE
If you are a smoker, it is important for your health to stop smoking. Please be aware that second hand smoke is also harmful. ,DirectAddress_Unknown ,DirectAddress_Unknown,DirectAddress_Unknown,DirectAddress_Unknown ,DirectAddress_Unknown,DirectAddress_Unknown,DirectAddress_Unknown,DirectAddress_Unknown

## 2023-10-24 NOTE — ED PROVIDER NOTE - DISPOSITION TYPE
[facility]   [address], [address] [place], [place] [address] Telephone   [phone]     No further appointment is needed with the surgeon.  Please continue to follow-up with your doctors as needed/scheduled.  Call us with any questions/comments/concerns.    Avoid lifting over 10 pounds for the full six weeks after surgery to protect your sternum during healing. (11/1/23). No repetitive pushing or pulling type movements with upper extremities, no mowing the lawn, no shoveling, no vacuuming as well. After that you can gradually increase your activity as tolerated.    We encourage you to participate in outpatient cardiac rehabilitation to build your strength and endurance.    You were cleared to drive 2 weeks post op, as long as you are no longer taking Narcotics.    Please wait to submerge yourself in water (bath, pool, hot tub) until your incisions have completely healed and scabs have fallen off.    It is not abnormal to continue to have chest wall nerve pain at this time after surgery. This should continue to improve as you heal. The cardiac rehab staff can review exercises with you to help with this pain. Chest wall stretching exercises will be very important. You can apply heat to chest wall muscles (30 minutes on, 30 minutes off) and take over the counter tylenol to help with pain (not to exceed 3000 mg per 24 hour period). Contact our office if the pain does not continue to improve over the next month or if your pain worsens.    Dr cMkenzie did not adjust any of your medications during your appointment today. Future medication adjustments and refills to be from your PCP and cardiologist.    You may receive a survey asking you about your visit. Please complete the survey. We will use your feedback to make improvements.      It has been our pleasure taking care of you!  Abner Mckenzie MD   
ADMIT

## 2024-08-06 NOTE — DISCHARGE NOTE PROVIDER - DISCHARGE DATE
"Patient presents to the clinic for pre-op.    FOOD SECURITY SCREENING QUESTIONS:    The next two questions are to help us understand your food security.  If you are feeling you need any assistance in this area, we have resources available to support you today.    Hunger Vital Signs:  Within the past 12 months we worried whether our food would run out before we got money to buy more. Never  Within the past 12 months the food we bought just didn't last and we didn't have money to get more. Never    Advance Care Directive on file? no  Advance Care Directive provided to patient? Declined.      Chief Complaint   Patient presents with    Pre-Op Exam       Initial /74 (BP Location: Right arm, Patient Position: Sitting, Cuff Size: Adult Regular)   Pulse 72   Temp 97.7  F (36.5  C) (Tympanic)   Resp 20   Ht 1.867 m (6' 1.5\")   Wt 92.1 kg (203 lb)   BMI 26.42 kg/m   Estimated body mass index is 26.42 kg/m  as calculated from the following:    Height as of this encounter: 1.867 m (6' 1.5\").    Weight as of this encounter: 92.1 kg (203 lb).  Medication Reconciliation: complete        Yanni Nelson LPN     "
02-Nov-2020

## 2024-09-23 NOTE — DISCHARGE NOTE NURSING/CASE MANAGEMENT/SOCIAL WORK - NSSCTYPOFSERV_GEN_ALL_CORE
Home care for skilled nursing and home physical therapy services. RN will call day after discharge to schedule visit.
Oriented - self; Oriented - place; Oriented - time